# Patient Record
Sex: MALE | Race: WHITE | NOT HISPANIC OR LATINO | Employment: OTHER | URBAN - METROPOLITAN AREA
[De-identification: names, ages, dates, MRNs, and addresses within clinical notes are randomized per-mention and may not be internally consistent; named-entity substitution may affect disease eponyms.]

---

## 2023-09-06 ENCOUNTER — HOSPITAL ENCOUNTER (OUTPATIENT)
Dept: DATA CONVERSION | Facility: HOSPITAL | Age: 58
Discharge: HOME | End: 2023-09-06
Payer: COMMERCIAL

## 2023-09-06 DIAGNOSIS — M79.89 OTHER SPECIFIED SOFT TISSUE DISORDERS: ICD-10-CM

## 2023-09-06 LAB
ANION GAP SERPL CALCULATED.3IONS-SCNC: 13 MMOL/L (ref 0–19)
BUN SERPL-MCNC: 13 MG/DL (ref 8–25)
BUN/CREAT SERPL: 13 RATIO (ref 8–21)
CALCIUM SERPL-MCNC: 9.6 MG/DL (ref 8.5–10.4)
CHLORIDE SERPL-SCNC: 109 MMOL/L (ref 97–107)
CO2 SERPL-SCNC: 23 MMOL/L (ref 24–31)
CREAT SERPL-MCNC: 1 MG/DL (ref 0.4–1.6)
DEPRECATED RDW RBC AUTO: 41.5 FL (ref 37–54)
ERYTHROCYTE [DISTWIDTH] IN BLOOD BY AUTOMATED COUNT: 12.7 % (ref 11.7–15)
GFR SERPL CREATININE-BSD FRML MDRD: 87 ML/MIN/1.73 M2
GLUCOSE SERPL-MCNC: 98 MG/DL (ref 65–99)
HCT VFR BLD AUTO: 47.4 % (ref 41–50)
HGB BLD-MCNC: 15.4 GM/DL (ref 13.5–16.5)
MCH RBC QN AUTO: 28.9 PG (ref 26–34)
MCHC RBC AUTO-ENTMCNC: 32.5 % (ref 31–37)
MCV RBC AUTO: 88.9 FL (ref 80–100)
NRBC BLD-RTO: 0 /100 WBC
PLATELET # BLD AUTO: 260 K/UL (ref 150–450)
PMV BLD AUTO: 10.2 CU (ref 7–12.6)
POTASSIUM SERPL-SCNC: 4.5 MMOL/L (ref 3.4–5.1)
RBC # BLD AUTO: 5.33 M/UL (ref 4.5–5.5)
SODIUM SERPL-SCNC: 145 MMOL/L (ref 133–145)
WBC # BLD AUTO: 8.9 K/UL (ref 4.5–11)

## 2023-09-18 ENCOUNTER — HOSPITAL ENCOUNTER (OUTPATIENT)
Dept: DATA CONVERSION | Facility: HOSPITAL | Age: 58
Discharge: HOME | End: 2023-09-18
Payer: COMMERCIAL

## 2023-09-18 DIAGNOSIS — Z12.5 ENCOUNTER FOR SCREENING FOR MALIGNANT NEOPLASM OF PROSTATE: ICD-10-CM

## 2023-09-18 DIAGNOSIS — Z00.00 ENCOUNTER FOR GENERAL ADULT MEDICAL EXAMINATION WITHOUT ABNORMAL FINDINGS: ICD-10-CM

## 2023-09-18 LAB
ALBUMIN SERPL-MCNC: 4 GM/DL (ref 3.5–5)
ALBUMIN/GLOB SERPL: 1.9 RATIO (ref 1.5–3)
ALP BLD-CCNC: 78 U/L (ref 35–125)
ALT SERPL-CCNC: 17 U/L (ref 5–40)
ANION GAP SERPL CALCULATED.3IONS-SCNC: 11 MMOL/L (ref 0–19)
APPEARANCE PLAS: CLEAR
AST SERPL-CCNC: 20 U/L (ref 5–40)
BILIRUB SERPL-MCNC: 0.6 MG/DL (ref 0.1–1.2)
BUN SERPL-MCNC: 12 MG/DL (ref 8–25)
BUN/CREAT SERPL: 12 RATIO (ref 8–21)
CALCIUM SERPL-MCNC: 9.1 MG/DL (ref 8.5–10.4)
CHLORIDE SERPL-SCNC: 109 MMOL/L (ref 97–107)
CHOLEST SERPL-MCNC: 175 MG/DL (ref 133–200)
CHOLEST/HDLC SERPL: 3.1 RATIO
CO2 SERPL-SCNC: 24 MMOL/L (ref 24–31)
COLOR SPUN FLD: YELLOW
CREAT SERPL-MCNC: 1 MG/DL (ref 0.4–1.6)
DEPRECATED RDW RBC AUTO: 40.7 FL (ref 37–54)
ERYTHROCYTE [DISTWIDTH] IN BLOOD BY AUTOMATED COUNT: 12.6 % (ref 11.7–15)
FASTING STATUS PATIENT QL REPORTED: NORMAL
GFR SERPL CREATININE-BSD FRML MDRD: 87 ML/MIN/1.73 M2
GLOBULIN SER-MCNC: 2.1 G/DL (ref 1.9–3.7)
GLUCOSE SERPL-MCNC: 99 MG/DL (ref 65–99)
HCT VFR BLD AUTO: 46.2 % (ref 41–50)
HDLC SERPL-MCNC: 57 MG/DL
HGB BLD-MCNC: 15.1 GM/DL (ref 13.5–16.5)
LDLC SERPL CALC-MCNC: 105 MG/DL (ref 65–130)
MCH RBC QN AUTO: 28.9 PG (ref 26–34)
MCHC RBC AUTO-ENTMCNC: 32.7 % (ref 31–37)
MCV RBC AUTO: 88.5 FL (ref 80–100)
NRBC BLD-RTO: 0 /100 WBC
PLATELET # BLD AUTO: 243 K/UL (ref 150–450)
PMV BLD AUTO: 10.2 CU (ref 7–12.6)
POTASSIUM SERPL-SCNC: 4.2 MMOL/L (ref 3.4–5.1)
PROT SERPL-MCNC: 6.1 G/DL (ref 5.9–7.9)
PSA SERPL-MCNC: 0.8 NG/ML (ref 0–4.1)
RBC # BLD AUTO: 5.22 M/UL (ref 4.5–5.5)
SODIUM SERPL-SCNC: 144 MMOL/L (ref 133–145)
TRIGL SERPL-MCNC: 67 MG/DL (ref 40–150)
WBC # BLD AUTO: 6.1 K/UL (ref 4.5–11)

## 2023-09-19 ENCOUNTER — HOSPITAL ENCOUNTER (OUTPATIENT)
Dept: DATA CONVERSION | Facility: HOSPITAL | Age: 58
Discharge: HOME | End: 2023-09-19
Payer: COMMERCIAL

## 2023-09-19 DIAGNOSIS — R10.84 GENERALIZED ABDOMINAL PAIN: ICD-10-CM

## 2023-09-19 PROBLEM — R55 SYNCOPE AND COLLAPSE: Status: ACTIVE | Noted: 2023-09-19

## 2023-09-19 PROBLEM — D68.59 THROMBOPHILIA (MULTI): Status: ACTIVE | Noted: 2023-09-19

## 2023-09-19 PROBLEM — L25.9 CONTACT DERMATITIS: Status: ACTIVE | Noted: 2023-09-19

## 2023-09-19 PROBLEM — D32.9 MENINGIOMA (MULTI): Status: ACTIVE | Noted: 2023-09-19

## 2023-09-19 PROBLEM — R06.02 SHORTNESS OF BREATH: Status: ACTIVE | Noted: 2023-09-19

## 2023-09-19 PROBLEM — M41.9 SCOLIOSIS: Status: ACTIVE | Noted: 2023-09-19

## 2023-09-19 PROBLEM — I82.409 DEEP VENOUS THROMBOSIS OF LEG (MULTI): Status: ACTIVE | Noted: 2023-09-19

## 2023-09-19 PROBLEM — R00.2 PALPITATIONS: Status: ACTIVE | Noted: 2023-09-19

## 2023-09-19 PROBLEM — M79.2 NEUROPATHIC PAIN: Status: ACTIVE | Noted: 2023-09-19

## 2023-09-19 PROBLEM — R12 HEARTBURN: Status: ACTIVE | Noted: 2023-09-19

## 2023-09-19 PROBLEM — E66.9 OBESITY: Status: ACTIVE | Noted: 2023-09-19

## 2023-09-19 PROBLEM — E78.5 HYPERLIPIDEMIA: Status: ACTIVE | Noted: 2023-09-19

## 2023-09-19 PROBLEM — L42 PITYRIASIS ROSEA: Status: ACTIVE | Noted: 2023-09-19

## 2023-09-19 PROBLEM — D68.51 FACTOR V LEIDEN MUTATION (MULTI): Status: ACTIVE | Noted: 2023-09-19

## 2023-09-19 PROBLEM — R07.9 CHEST PAIN: Status: ACTIVE | Noted: 2023-09-19

## 2023-09-19 PROBLEM — D17.9 LIPOMA: Status: ACTIVE | Noted: 2023-09-19

## 2023-09-19 PROBLEM — S39.012A BACK STRAIN: Status: ACTIVE | Noted: 2023-09-19

## 2023-09-19 PROBLEM — J35.8 TONSIL STONE: Status: ACTIVE | Noted: 2023-09-19

## 2023-09-19 PROBLEM — I26.99 PULMONARY EMBOLISM (MULTI): Status: ACTIVE | Noted: 2023-09-19

## 2023-09-19 PROBLEM — G44.209 TENSION TYPE HEADACHE: Status: ACTIVE | Noted: 2023-09-19

## 2023-09-19 PROBLEM — F41.9 ANXIETY: Status: ACTIVE | Noted: 2023-09-19

## 2023-09-19 PROBLEM — R07.89 TIGHT CHEST: Status: ACTIVE | Noted: 2023-09-19

## 2023-09-19 PROBLEM — D68.2 FACTOR II DEFICIENCY (MULTI): Status: ACTIVE | Noted: 2023-09-19

## 2023-09-19 RX ORDER — CYCLOBENZAPRINE HCL 10 MG
5 TABLET ORAL 2 TIMES DAILY PRN
COMMUNITY
Start: 2023-05-02 | End: 2023-10-19 | Stop reason: WASHOUT

## 2023-09-19 RX ORDER — LORATADINE 10 MG/1
10 TABLET ORAL DAILY
COMMUNITY
End: 2023-10-19 | Stop reason: WASHOUT

## 2023-09-19 RX ORDER — OMEPRAZOLE 20 MG/1
20 CAPSULE, DELAYED RELEASE ORAL
COMMUNITY
Start: 2023-05-25 | End: 2023-10-19 | Stop reason: WASHOUT

## 2023-09-19 RX ORDER — LOPERAMIDE HCL 2 MG
TABLET ORAL
COMMUNITY
Start: 2022-06-08 | End: 2023-10-19 | Stop reason: WASHOUT

## 2023-09-19 RX ORDER — ACETAMINOPHEN 325 MG/1
TABLET ORAL
COMMUNITY

## 2023-09-19 RX ORDER — FLUTICASONE PROPIONATE 50 MCG
1 SPRAY, SUSPENSION (ML) NASAL DAILY
COMMUNITY

## 2023-09-19 RX ORDER — FLUOXETINE 10 MG/1
1 CAPSULE ORAL NIGHTLY
COMMUNITY
Start: 2016-02-01 | End: 2023-10-19 | Stop reason: WASHOUT

## 2023-09-19 RX ORDER — WARFARIN 10 MG/1
1 TABLET ORAL DAILY
COMMUNITY
End: 2023-10-19 | Stop reason: WASHOUT

## 2023-09-19 RX ORDER — DICLOFENAC SODIUM 10 MG/G
GEL TOPICAL
COMMUNITY
Start: 2023-02-14

## 2023-09-19 RX ORDER — ONDANSETRON 4 MG/1
4 TABLET, ORALLY DISINTEGRATING ORAL DAILY
COMMUNITY
Start: 2023-05-25 | End: 2023-10-19 | Stop reason: WASHOUT

## 2023-09-20 ENCOUNTER — HOSPITAL ENCOUNTER (OUTPATIENT)
Dept: DATA CONVERSION | Facility: HOSPITAL | Age: 58
Discharge: HOME | End: 2023-09-20
Payer: COMMERCIAL

## 2023-09-20 DIAGNOSIS — E66.9 OBESITY, UNSPECIFIED: ICD-10-CM

## 2023-09-20 DIAGNOSIS — Z86.718 PERSONAL HISTORY OF OTHER VENOUS THROMBOSIS AND EMBOLISM: ICD-10-CM

## 2023-09-20 DIAGNOSIS — Z79.01 LONG TERM (CURRENT) USE OF ANTICOAGULANTS: ICD-10-CM

## 2023-09-20 DIAGNOSIS — K21.9 GASTRO-ESOPHAGEAL REFLUX DISEASE WITHOUT ESOPHAGITIS: ICD-10-CM

## 2023-09-20 DIAGNOSIS — M79.89 OTHER SPECIFIED SOFT TISSUE DISORDERS: ICD-10-CM

## 2023-09-20 DIAGNOSIS — M79.9 SOFT TISSUE DISORDER, UNSPECIFIED: ICD-10-CM

## 2023-09-20 DIAGNOSIS — D68.2 HEREDITARY DEFICIENCY OF OTHER CLOTTING FACTORS (MULTI): ICD-10-CM

## 2023-09-20 DIAGNOSIS — K44.9 DIAPHRAGMATIC HERNIA WITHOUT OBSTRUCTION OR GANGRENE: ICD-10-CM

## 2023-09-20 DIAGNOSIS — Z86.711 PERSONAL HISTORY OF PULMONARY EMBOLISM: ICD-10-CM

## 2023-10-19 ENCOUNTER — OFFICE VISIT (OUTPATIENT)
Dept: PRIMARY CARE | Facility: CLINIC | Age: 58
End: 2023-10-19
Payer: COMMERCIAL

## 2023-10-19 VITALS
OXYGEN SATURATION: 97 % | RESPIRATION RATE: 18 BRPM | WEIGHT: 211.3 LBS | TEMPERATURE: 95.8 F | HEIGHT: 70 IN | DIASTOLIC BLOOD PRESSURE: 80 MMHG | HEART RATE: 61 BPM | BODY MASS INDEX: 30.25 KG/M2 | SYSTOLIC BLOOD PRESSURE: 118 MMHG

## 2023-10-19 DIAGNOSIS — Z00.00 ANNUAL PHYSICAL EXAM: Primary | ICD-10-CM

## 2023-10-19 DIAGNOSIS — Z23 ENCOUNTER FOR IMMUNIZATION: ICD-10-CM

## 2023-10-19 DIAGNOSIS — R91.8 MULTIPLE LUNG NODULES ON CT: ICD-10-CM

## 2023-10-19 PROBLEM — M79.2 NEUROPATHIC PAIN: Status: RESOLVED | Noted: 2023-09-19 | Resolved: 2023-10-19

## 2023-10-19 PROBLEM — I82.409 DEEP VENOUS THROMBOSIS OF LEG (MULTI): Status: RESOLVED | Noted: 2023-09-19 | Resolved: 2023-10-19

## 2023-10-19 PROBLEM — S39.012A BACK STRAIN: Status: RESOLVED | Noted: 2023-09-19 | Resolved: 2023-10-19

## 2023-10-19 PROBLEM — J35.8 TONSIL STONE: Status: RESOLVED | Noted: 2023-09-19 | Resolved: 2023-10-19

## 2023-10-19 PROBLEM — R07.9 CHEST PAIN: Status: RESOLVED | Noted: 2023-09-19 | Resolved: 2023-10-19

## 2023-10-19 PROBLEM — R06.02 SHORTNESS OF BREATH: Status: RESOLVED | Noted: 2023-09-19 | Resolved: 2023-10-19

## 2023-10-19 PROBLEM — R55 SYNCOPE AND COLLAPSE: Status: RESOLVED | Noted: 2023-09-19 | Resolved: 2023-10-19

## 2023-10-19 PROBLEM — L25.9 CONTACT DERMATITIS: Status: RESOLVED | Noted: 2023-09-19 | Resolved: 2023-10-19

## 2023-10-19 PROBLEM — D32.9 MENINGIOMA (MULTI): Status: RESOLVED | Noted: 2023-09-19 | Resolved: 2023-10-19

## 2023-10-19 PROBLEM — R12 HEARTBURN: Status: RESOLVED | Noted: 2023-09-19 | Resolved: 2023-10-19

## 2023-10-19 PROBLEM — D68.59 THROMBOPHILIA (MULTI): Status: RESOLVED | Noted: 2023-09-19 | Resolved: 2023-10-19

## 2023-10-19 PROBLEM — Z86.718 HISTORY OF DEEP VENOUS THROMBOSIS (DVT) OF DISTAL VEIN OF LEFT LOWER EXTREMITY: Status: ACTIVE | Noted: 2023-10-19

## 2023-10-19 PROBLEM — I26.99 PULMONARY EMBOLISM (MULTI): Status: RESOLVED | Noted: 2023-09-19 | Resolved: 2023-10-19

## 2023-10-19 PROBLEM — Z86.711 HISTORY OF PULMONARY EMBOLISM: Status: ACTIVE | Noted: 2023-10-19

## 2023-10-19 PROBLEM — D68.51 FACTOR V LEIDEN MUTATION (MULTI): Status: RESOLVED | Noted: 2023-09-19 | Resolved: 2023-10-19

## 2023-10-19 PROBLEM — R07.89 TIGHT CHEST: Status: RESOLVED | Noted: 2023-09-19 | Resolved: 2023-10-19

## 2023-10-19 PROBLEM — R00.2 PALPITATIONS: Status: RESOLVED | Noted: 2023-09-19 | Resolved: 2023-10-19

## 2023-10-19 PROCEDURE — 1036F TOBACCO NON-USER: CPT | Performed by: FAMILY MEDICINE

## 2023-10-19 PROCEDURE — 99396 PREV VISIT EST AGE 40-64: CPT | Performed by: FAMILY MEDICINE

## 2023-10-19 PROCEDURE — 3008F BODY MASS INDEX DOCD: CPT | Performed by: FAMILY MEDICINE

## 2023-10-19 PROCEDURE — 90686 IIV4 VACC NO PRSV 0.5 ML IM: CPT | Performed by: FAMILY MEDICINE

## 2023-10-19 RX ORDER — OMEPRAZOLE 40 MG/1
40 CAPSULE, DELAYED RELEASE ORAL
COMMUNITY
End: 2023-12-04 | Stop reason: WASHOUT

## 2023-10-19 ASSESSMENT — PAIN SCALES - GENERAL: PAINLEVEL: 2

## 2023-10-19 NOTE — PROGRESS NOTES
"Subjective   Patient ID: Mikey Salinas is a 57 y.o. male who presents for Annual Exam (Physical and bloodwork).  Last physical with previous PCP was 10/13/2022.  Last screening colonoscopy 2015. Having GI issues and seeing GI. On probiotics. Had CT Abp/P done 6/2022 he has questions about lung nodules which were small.  History of DVT PE  in 2014;  on Xarelto 20 mg daily.  Weight at that time was 237.  UTD with labs, did them 9/2023. Reviewed with patient.  Vaccinations with Shingles, Tdap UTD.         Review of Systems    Objective   /80   Pulse 61   Temp 35.4 °C (95.8 °F)   Resp 18   Ht 1.778 m (5' 10\")   Wt 95.8 kg (211 lb 4.8 oz)   SpO2 97%   BMI 30.32 kg/m²    Physical Exam  Vitals and nursing note reviewed.   Constitutional:       Appearance: Normal appearance.   HENT:      Head: Normocephalic.      Right Ear: Tympanic membrane normal.      Left Ear: Tympanic membrane normal.      Nose: Nose normal.      Mouth/Throat:      Mouth: Mucous membranes are moist.   Eyes:      Extraocular Movements: Extraocular movements intact.      Conjunctiva/sclera: Conjunctivae normal.      Pupils: Pupils are equal, round, and reactive to light.   Cardiovascular:      Rate and Rhythm: Normal rate and regular rhythm.      Heart sounds: Normal heart sounds.   Pulmonary:      Effort: Pulmonary effort is normal. No respiratory distress.      Breath sounds: Normal breath sounds.   Abdominal:      General: Abdomen is flat.      Palpations: Abdomen is soft.      Tenderness: There is no abdominal tenderness.   Musculoskeletal:      Cervical back: Neck supple.   Lymphadenopathy:      Cervical: No cervical adenopathy.   Skin:     General: Skin is warm and dry.      Findings: No rash.   Neurological:      General: No focal deficit present.      Mental Status: He is alert. Mental status is at baseline.      Coordination: Coordination normal.      Gait: Gait normal.      Deep Tendon Reflexes: Reflexes normal.   Psychiatric:    "      Mood and Affect: Mood normal.         Behavior: Behavior normal.         Assessment/Plan   Diagnoses and all orders for this visit:  Annual physical exam  Multiple lung nodules on CT  -     CT chest wo IV contrast; Future  Encounter for immunization  -     Flu vaccine (IIV4) age 6 months and greater, preservative free  Other orders  -     Follow Up In Primary Care - Health Maintenance; Future

## 2023-10-20 ENCOUNTER — ALLIED HEALTH (OUTPATIENT)
Dept: INTEGRATIVE MEDICINE | Facility: CLINIC | Age: 58
End: 2023-10-20

## 2023-10-20 PROCEDURE — 97810 ACUP 1/> WO ESTIM 1ST 15 MIN: CPT | Performed by: NATUROPATH

## 2023-10-20 PROCEDURE — 97811 ACUP 1/> W/O ESTIM EA ADD 15: CPT | Performed by: NATUROPATH

## 2023-10-20 NOTE — PROGRESS NOTES
Acupuncture Visit:     Subjective   Patient ID: Mikey Salinas is a 57 y.o. male who presents for No chief complaint on file.  Had lipoma surgery  Colonoscopy  14d abx ending Saturday  No working on weening pantoprazole  Still some bloating and discomfort.     Sleep more than typical , bit still nor a full night sleep    Still on mostly no dairy diet, down 26lbs from a year ago    BM- well formed, easy to pass,   Bloated feeling, no abdominal pain.               Session Information  Is this acupuncture treatment being billed to the patient's insurance company: No  Visit Type: Follow-up visit  Medical History Reviewed: I have reviewed pertinent medical history in EHR, and no contraindications are present to provide treatment               Review of Systems         Provider reviewed plan for the acupuncture session, precautions and contraindications. Patient/guardian/hospital staff has given consent to treat with full understanding of what to expect during the session. Before acupuncture began, provider explained to the patient to communicate at any time if the procedure was causing discomfort past their tolerance level. Patient agreed to advise acupuncturist. The acupuncturist counseled the patient on the risks of acupuncture treatment including pain, infection, bleeding, and no relief of pain. The patient was positioned comfortably. There was no evidence of infection at the site of needle insertions.    Objective   Physical Exam              Acupuncture Treatment  Body Points - Bilateral: Du 20,, LI11, SJ7, LI4, SP9, St36, SP6, KI7, LR3, SP3  Auricular Points: Yes  Auricular Points - Bilateral: ear ramirez men, pt 0  Other Techniques Utilized: TDP Lamp  TDP Lamp Descripton: abdomen  Needle Count In: 23  Needle Count Out: 23  Needle Retention Time (min): 25 minutes  Total Face to Face Time (min): 25 minutes              Assessment/Plan

## 2023-10-23 ENCOUNTER — OFFICE VISIT (OUTPATIENT)
Dept: SURGERY | Facility: CLINIC | Age: 58
End: 2023-10-23
Payer: COMMERCIAL

## 2023-10-23 VITALS
SYSTOLIC BLOOD PRESSURE: 112 MMHG | BODY MASS INDEX: 30.75 KG/M2 | DIASTOLIC BLOOD PRESSURE: 78 MMHG | OXYGEN SATURATION: 94 % | WEIGHT: 214.8 LBS | HEIGHT: 70 IN | TEMPERATURE: 98.2 F | HEART RATE: 67 BPM

## 2023-10-23 DIAGNOSIS — Z09 POSTOPERATIVE EXAMINATION: Primary | ICD-10-CM

## 2023-10-23 PROBLEM — H25.13 AGE-RELATED NUCLEAR CATARACT OF BOTH EYES: Status: ACTIVE | Noted: 2023-07-20

## 2023-10-23 PROCEDURE — 99024 POSTOP FOLLOW-UP VISIT: CPT | Performed by: STUDENT IN AN ORGANIZED HEALTH CARE EDUCATION/TRAINING PROGRAM

## 2023-10-23 PROCEDURE — 3008F BODY MASS INDEX DOCD: CPT | Performed by: STUDENT IN AN ORGANIZED HEALTH CARE EDUCATION/TRAINING PROGRAM

## 2023-10-23 PROCEDURE — 1036F TOBACCO NON-USER: CPT | Performed by: STUDENT IN AN ORGANIZED HEALTH CARE EDUCATION/TRAINING PROGRAM

## 2023-10-23 ASSESSMENT — ENCOUNTER SYMPTOMS
OCCASIONAL FEELINGS OF UNSTEADINESS: 0
LOSS OF SENSATION IN FEET: 0
DEPRESSION: 0

## 2023-10-23 ASSESSMENT — PATIENT HEALTH QUESTIONNAIRE - PHQ9
1. LITTLE INTEREST OR PLEASURE IN DOING THINGS: NOT AT ALL
2. FEELING DOWN, DEPRESSED OR HOPELESS: NOT AT ALL
SUM OF ALL RESPONSES TO PHQ9 QUESTIONS 1 AND 2: 0

## 2023-10-23 ASSESSMENT — PAIN SCALES - GENERAL: PAINLEVEL: 2

## 2023-10-23 NOTE — PROGRESS NOTES
Subjective   Patient ID: Mikey Salinas is a 57 y.o. male who presents for Follow-up (S/P Excision of Multiple Lipomas and Abdominal Wall Mass 09/20/2023 ).  Doing well since surgery.  The wounds have been healing up without issue and the glue is now mostly off.  He was on vacation and did not have any trouble with the incisions while away.          Objective   Physical Exam  Abdominal:      Palpations: Abdomen is soft.      Comments: 3 incisions on the left abdomen and 1 on the right flank all well-healing.  Small amount of glue remaining on the right flank.  No surrounding erythema or drainage.         Assessment/Plan   Problem List Items Addressed This Visit    None  Visit Diagnoses         Codes    Postoperative examination    -  Primary Z09          Status post excision of 4 lipomas of his abdominal wall, recovering well.  We reviewed the pathology which were lipomas for all of them.  All questions were answered.  He can follow-up as needed.

## 2023-11-13 ENCOUNTER — OFFICE VISIT (OUTPATIENT)
Dept: CARDIOLOGY | Facility: CLINIC | Age: 58
End: 2023-11-13
Payer: COMMERCIAL

## 2023-11-13 VITALS
DIASTOLIC BLOOD PRESSURE: 93 MMHG | WEIGHT: 212.8 LBS | RESPIRATION RATE: 18 BRPM | OXYGEN SATURATION: 99 % | TEMPERATURE: 98.9 F | BODY MASS INDEX: 30.46 KG/M2 | SYSTOLIC BLOOD PRESSURE: 137 MMHG | HEART RATE: 68 BPM | HEIGHT: 70 IN

## 2023-11-13 DIAGNOSIS — R00.2 PALPITATIONS: ICD-10-CM

## 2023-11-13 DIAGNOSIS — E78.2 MIXED HYPERLIPIDEMIA: ICD-10-CM

## 2023-11-13 DIAGNOSIS — R07.89 TIGHT CHEST: Primary | ICD-10-CM

## 2023-11-13 DIAGNOSIS — I10 PRIMARY HYPERTENSION: ICD-10-CM

## 2023-11-13 PROCEDURE — 3080F DIAST BP >= 90 MM HG: CPT | Performed by: INTERNAL MEDICINE

## 2023-11-13 PROCEDURE — 1036F TOBACCO NON-USER: CPT | Performed by: INTERNAL MEDICINE

## 2023-11-13 PROCEDURE — 99213 OFFICE O/P EST LOW 20 MIN: CPT | Performed by: INTERNAL MEDICINE

## 2023-11-13 PROCEDURE — 3008F BODY MASS INDEX DOCD: CPT | Performed by: INTERNAL MEDICINE

## 2023-11-13 PROCEDURE — 3075F SYST BP GE 130 - 139MM HG: CPT | Performed by: INTERNAL MEDICINE

## 2023-11-13 PROCEDURE — 93000 ELECTROCARDIOGRAM COMPLETE: CPT | Performed by: INTERNAL MEDICINE

## 2023-11-13 RX ORDER — PANTOPRAZOLE SODIUM 40 MG/1
40 TABLET, DELAYED RELEASE ORAL DAILY
COMMUNITY
Start: 2023-11-06 | End: 2024-05-06 | Stop reason: DRUGHIGH

## 2023-11-13 RX ORDER — PANTOPRAZOLE SODIUM 20 MG/1
20 TABLET, DELAYED RELEASE ORAL DAILY
COMMUNITY
Start: 2023-11-09

## 2023-11-13 RX ORDER — KETOCONAZOLE 20 MG/G
1 CREAM TOPICAL 2 TIMES DAILY
COMMUNITY
Start: 2023-11-08

## 2023-11-13 ASSESSMENT — PAIN SCALES - GENERAL: PAINLEVEL: 3

## 2023-11-13 NOTE — PROGRESS NOTES
History of present illness:   58 year old male patient here today following up on hx of PE a few years ago . Patient on life long oral anticoagulation.  Here in my office for follow-up.  Has been feeling overall good denies any chest pain or shortness of breath.  No dizziness or lightheadedness.  Had a coronary calcium score in July 2023 which was total score of 0.  Patient has no chest pains.     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    History of deep venous thrombosis (DVT) of distal vein of left lower extremity 10/19/2023    2014    History of pulmonary embolism 10/19/2023    2014    Hyperlipidemia 09/19/2023    Other pulmonary embolism without acute cor pulmonale (CMS/[facility]) 02/03/2015    Pulmonary embolism    Personal history of diseases of the blood and blood-forming organs and certain disorders involving the immune mechanism     History of hypercoagulable state    Personal history of other venous thrombosis and embolism     History of deep venous thrombosis       Past Surgical History:   Procedure Laterality Date    KNEE ARTHROSCOPY W/ DEBRIDEMENT  02/02/2015    Arthroscopy Knee Left    LIPOMA RESECTION N/A 09/2023    multiple abdominal lipoma's excised    MR HEAD ANGIO WO IV CONTRAST  12/15/2016    MR HEAD ANGIO WO IV CONTRAST [facility]       Allergies   Allergen Reactions    Chocolate Flavor GI Upset    Cocoa Nausea/vomiting    Penicillins Rash        reports that he has never smoked. He has never been exposed to tobacco smoke. He has never used smokeless tobacco. He reports current alcohol use of about 2.0 standard drinks of alcohol per week. He reports that he does not use drugs.    Family History   Problem Relation Name Age of Onset    Macular degeneration Mother Megha     Osteoporosis Mother Megha     Transient ischemic attack Mother Megha     Brain Aneurysm  Mother Megha     Hypertension Mother Megha     Other (Heart valve surgery) Father Manjinder     Other (RHEUMATIC HEART DISEASE) Father Ray     Nephrolithiasis Father Ray     Heart disease Father Ray     Hypertension Father Ray     Breast cancer Sister Peg     Irritable bowel syndrome Sister Peg     ROGER disease Sister Peg     Hypertension Sister Peg     Prostate cancer Brother      Cancer Other GRAND FATHER        Patient's Medications   New Prescriptions    No medications on file   Previous Medications    ACETAMINOPHEN (TYLENOL) 325 MG TABLET    Take by mouth.    DICLOFENAC SODIUM (VOLTAREN) 1 % GEL GEL    Apply topically.    FAMOTIDINE-CA CARB-MAG HYDROX (PEPCID COMPLETE) -165 MG CHEWABLE TABLET    Chew 1 tablet once daily as needed for heartburn.    FLUTICASONE (FLONASE ALLERGY RELIEF) 50 MCG/ACTUATION NASAL SPRAY    Administer 1 spray into each nostril once daily.    KETOCONAZOLE (NIZORAL) 2 % CREAM    Apply 1 Application topically 2 times a day.    L.ACID/L.CASEI/B.BIF/B.LIZZY/FOS (PROBIOTIC BLEND ORAL)    Take by mouth.    OMEPRAZOLE (PRILOSEC) 40 MG DR CAPSULE    Take 1 capsule (40 mg) by mouth once daily in the morning. Take before meals. Do not crush or chew.    PANTOPRAZOLE (PROTONIX) 20 MG EC TABLET    Take 1 tablet (20 mg) by mouth once daily.    PANTOPRAZOLE (PROTONIX) 40 MG EC TABLET    Take 1 tablet (40 mg) by mouth once daily.    RIVAROXABAN (XARELTO) 20 MG TABLET    Take 1 tablet (20 mg) by mouth once daily.   Modified Medications    No medications on file   Discontinued Medications    No medications on file       Objective   Physical Exam  General: Patient in no acute distress   HEENT: Atraumatic normocephalic.  Neck: Supple, jugular venous pressure within normal limit.  No bruits  Lungs: Clear to auscultation bilaterally  Cardiovascular: Regular rate and rhythm, normal heart sounds, no murmurs rubs or gallops  Abdomen: Soft nontender nondistended.  Normal bowel sounds.  Extremities: Warm to touch, no  "edema.    Lab Review   [facility] Encounter on 09/20/2023   Component Date Value    CONVERTED FINAL DIAGNOSIS 09/20/2023                      Value:  1. SOFT TISSUE OF RIGHT FLANK, EXCISION:       FRAGMENTS OF MATURE ADIPOSE TISSUE CONSISTENT WITH LIPOMA.    2. SOFT TISSUE OF LEFT UPPER ABDOMINAL WALL, EXCISION:       LIPOMA.    3. SOFT TISSUE OF LEFT LOWER ABDOMINAL WALL, EXCISION:       LIPOMA.    4. SOFT TISSUE OF MID ABDOMINAL WALL, EXCISION:       FRAGMENTS OF MATURE ADIPOSE TISSUE CONSISTENT WITH LIPOMA.  CORA SOSA M.D.  09/25/2023 09:56  [facility] - 09/22/2023      CONVERTED CLINICAL DIAGN* 09/20/2023 Soft tissue mass abdominal     CONVERTED GROSS DESCRIPT* 09/20/2023                      Value:1.  Received in formalin labeled with the patient's name and hospital  number and \"right lipoma flank #1\" is a 2.0 x 1.7 x 1.2 cm, 3 g  aggregate of lobulated and yellow fibrofatty soft tissues.  The external  surfaces are inked blue.  The cut surfaces are homogeneous, lobulated,  yellow, and fatty.  There are no areas of hemorrhage, necrosis,  granularity, or fleshy foci.  The specimen is entirely submitted in 1  cassette.    2.  Received in formalin labeled with the patient's name and hospital  number and \"left upper abdomen lipoma #2\" is 1 nodular piece of yellow  fibrofatty soft tissue measuring 2.5 x 2.5 x 1.4 cm, 4 g.  The external  surfaces are smooth to lobulated and inked blue.  The cut surface is  homogeneous, yellow, and fatty.  There are no areas of hemorrhage,  necrosis, granularity, or fleshy foci.  The specimen is entirely  submitted in 1 cassette.    3.  Received in formalin labeled with the patient's name and hospital  number and \"left lower abdomen lipoma #3\" is 1 thi                          nly encapsulated and  nodular portion of yellow fibrofatty soft tissue measuring 1.9 x 0.9 x  0.6 cm, 2 g.  The smooth and intact capsule is inked blue.  The cut  surface is homogeneous, lobulated, yellow, and fatty.  " "There are no  areas of hemorrhage, necrosis, granularity, or fleshy foci.  The  specimen is entirely submitted in 1 cassette.    4.  Received in formalin labeled with the patient's name and hospital  number and \"left mid abdomen lipoma #4\" is a 2.1 x 1.5 x 0.6 cm, 2 g  aggregate of yellow fibrofatty soft tissue.  The external surfaces are  demarcated and inked black.  The cut surfaces are homogeneous,  lobulated, yellow, and fatty.  There are no areas of hemorrhage,  necrosis, granularity, or fleshy foci.  The specimen is entirely  submitted in 1 cassette.      CONVERTED PRE-OPERATIVE * 09/20/2023                      Value:Excision soft tissue mass abdominal wall > 3 cm and multiple lipomas of  trunk      CONVERTED MICROSCOPIC DE* 09/20/2023 Slides examined; description omitted.    Hospital Outpatient Visit on 09/18/2023   Component Date Value    WBC 09/18/2023 6.1     RBC 09/18/2023 5.22     Hemoglobin 09/18/2023 15.1     Hematocrit 09/18/2023 46.2     MCV 09/18/2023 88.5     MCH 09/18/2023 28.9     MCHC 09/18/2023 32.7     RDW-SD 09/18/2023 40.7     RDW-CV 09/18/2023 12.6     Platelets 09/18/2023 243     MPV 09/18/2023 10.2     nRBC 09/18/2023 0     Calcium 09/18/2023 9.1     AST 09/18/2023 20     Alkaline Phosphatase 09/18/2023 78     Total Bilirubin 09/18/2023 0.6     Total Protein 09/18/2023 6.1     Albumin 09/18/2023 4.0     Globulin, Total 09/18/2023 2.1     A/G Ratio 09/18/2023 1.9     Sodium 09/18/2023 144     Potassium 09/18/2023 4.2     Chloride 09/18/2023 109 (H)     Bicarbonate 09/18/2023 24     Anion Gap 09/18/2023 11     Urea Nitrogen 09/18/2023 12     Creatinine 09/18/2023 1.0     Urea Nitrogen/Creatinine* 09/18/2023 12.0     Glucose 09/18/2023 99     ALT (SGPT) 09/18/2023 17     ESTIMATED GFR 09/18/2023 87     SERUM COLOR 09/18/2023 YELLOW     SERUM CLARITY 09/18/2023 CLEAR     Is the patient fasting? 09/18/2023 FASTING     Cholesterol 09/18/2023 175     Triglycerides 09/18/2023 67     HDL " 09/18/2023 57     LDL Calculated 09/18/2023 105     Cholesterol/HDL Ratio 09/18/2023 3.1     PSA 09/18/2023 0.8         Assessment/Plan   Patient Active Problem List   Diagnosis    Anxiety    Factor II deficiency (CMS/HCC)    Hyperlipidemia    Lipoma    Obesity    Pityriasis rosea    Scoliosis    Tension type headache    History of pulmonary embolism    History of deep venous thrombosis (DVT) of distal vein of left lower extremity    Age-related nuclear cataract of both eyes         58 year old male patient here today following up on hx of PE a few years ago . Patient on life long oral anticoagulation.  Here in my office for follow-up.  Has been feeling overall good denies any chest pain or shortness of breath.  No dizziness or lightheadedness.  Had a coronary calcium score in July 2023 which was total score of 0.  Patient has no chest pains.  At this point recommended risk factor modification follow-up in 1 year.  EKG today showed normal sinus rhythm nonspecific ST-T wave abnormalities. Will follow up in  1 year    Rodo Kaur MD

## 2023-11-22 ENCOUNTER — E-VISIT (OUTPATIENT)
Dept: PRIMARY CARE | Facility: CLINIC | Age: 58
End: 2023-11-22
Payer: COMMERCIAL

## 2023-11-22 DIAGNOSIS — G89.4 CHRONIC PAIN SYNDROME: Primary | ICD-10-CM

## 2023-12-04 ENCOUNTER — OFFICE VISIT (OUTPATIENT)
Dept: PRIMARY CARE | Facility: CLINIC | Age: 58
End: 2023-12-04
Payer: COMMERCIAL

## 2023-12-04 VITALS
RESPIRATION RATE: 18 BRPM | TEMPERATURE: 95.9 F | HEIGHT: 70 IN | WEIGHT: 214.2 LBS | OXYGEN SATURATION: 97 % | HEART RATE: 77 BPM | BODY MASS INDEX: 30.67 KG/M2 | SYSTOLIC BLOOD PRESSURE: 130 MMHG | DIASTOLIC BLOOD PRESSURE: 78 MMHG

## 2023-12-04 DIAGNOSIS — M41.9 SCOLIOSIS, UNSPECIFIED SCOLIOSIS TYPE, UNSPECIFIED SPINAL REGION: ICD-10-CM

## 2023-12-04 DIAGNOSIS — R10.84 GENERALIZED ABDOMINAL PAIN: Primary | ICD-10-CM

## 2023-12-04 DIAGNOSIS — S80.262D: ICD-10-CM
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
## 2023-12-04 PROBLEM — K44.9 GASTROESOPHAGEAL REFLUX DISEASE WITH HIATAL HERNIA: Status: ACTIVE | Noted: 2023-12-04

## 2023-12-04 PROBLEM — K21.9 GASTROESOPHAGEAL REFLUX DISEASE WITH HIATAL HERNIA: Status: ACTIVE | Noted: 2023-12-04

## 2023-12-04 PROCEDURE — 99214 OFFICE O/P EST MOD 30 MIN: CPT | Performed by: FAMILY MEDICINE

## 2023-12-04 PROCEDURE — 1036F TOBACCO NON-USER: CPT | Performed by: FAMILY MEDICINE

## 2023-12-04 PROCEDURE — 3008F BODY MASS INDEX DOCD: CPT | Performed by: FAMILY MEDICINE

## 2023-12-04 ASSESSMENT — PAIN SCALES - GENERAL: PAINLEVEL: 3

## 2023-12-04 NOTE — PROGRESS NOTES
"History Of Present Illness  Mikey Salinas is a 58 y.o. male presenting for \"Lyme Disease (Lyme Titer./Swelling in ABD).\"    Has various concerns:    1. Was in urgent care two months ago after he found a tick on him on a trip to [place]. Tick attachment was 24 hours at most. No symptoms. Urgent care started him on doxycycline and told him to repeat titers. He completed the abx and has not had any new symptoms.     2. Ongoing abdominal pain that wraps the front of his stomach. Has seen GI (Dr. Jack) for evaluation. Trialed on a PPI and probiotics, which he reports minimally relieved symptoms. Follow up with GI resulted in a recommendation for referral to Rheumatology, which he has an appointment with next month.  He was told it was not a GI etiology, but musculoskeletal. However, taking muscle relaxers are not helpful. He did acupuncture and it also minimally relieved the abdominal pain.    3. He has scoliosis and is concerned that is causes musculoskeletal issues. At this point, patient is very frustrated and discouraged. Mood has been down, low motivation. He has never been on any SSRIs/SNRIs in the past. He would like to hold off on mood medications until workup is completed.         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    diclofenac sodium " (Voltaren) 1 % gel gel Apply topically.    famotidine-Ca carb-mag hydrox (Pepcid Complete) -165 mg chewable tablet Chew 1 tablet once daily as needed for heartburn.    fluticasone (Flonase Allergy Relief) 50 mcg/actuation nasal spray Administer 1 spray into each nostril once daily.    ketoconazole (NIZOral) 2 % cream Apply 1 Application topically 2 times a day.    L.acid/L.casei/B.bif/B.kenroy/FOS (PROBIOTIC BLEND ORAL) Take by mouth.    pantoprazole (ProtoNix) 20 mg EC tablet Take 1 tablet (20 mg) by mouth once daily.    pantoprazole (ProtoNix) 40 mg EC tablet Take 1 tablet (40 mg) by mouth once daily.    rivaroxaban (Xarelto) 20 mg tablet Take 1 tablet (20 mg) by mouth once daily.    [DISCONTINUED] omeprazole (PriLOSEC) 40 mg DR capsule Take 1 capsule (40 mg) by mouth once daily in the morning. Take before meals. Do not crush or chew.     No current facility-administered medications on file prior to visit.        Surgical History  He has a past surgical history that includes Knee arthroscopy w/ debridement (02/02/2015); MR angio head wo IV contrast (12/15/2016); Lipoma resection (N/A, 09/2023); and [place] tooth extraction (1982).     Social History  He reports that he has never smoked. He has never been exposed to tobacco smoke. He has never used smokeless tobacco. He reports current alcohol use of about 2.0 standard drinks of alcohol per week. He reports that he does not use drugs.    Family History  Family History   Problem Relation Name Age of Onset    Macular degeneration Mother Megha     Osteoporosis Mother Megha     Transient ischemic attack Mother Megha     Brain Aneurysm Mother Megha     Hypertension Mother Megha     Other (Heart valve surgery) Father Ray     Other (RHEUMATIC HEART DISEASE) Father Ray     Nephrolithiasis Father Ray     Heart disease Father Ray     Hypertension Father Ray     Breast cancer Sister Peg     Irritable bowel syndrome Sister Peg     ROGER disease Sister Peg     Hypertension  Sister Peg     Cancer Sister Peg     Prostate cancer Brother      Cancer Other GRAND FATHER     Cancer Brother Yves Salinas         Allergies  Chocolate flavor, Cocoa, and Penicillins    ROS  Negative, except in HPI     Last Recorded Vitals  /78   Pulse 77   Temp 35.5 °C (95.9 °F)   Resp 18   Wt 97.2 kg (214 lb 3.2 oz)   SpO2 97%   Body mass index is 30.73 kg/m².     Physical Exam  Vitals and nursing note reviewed.   Constitutional:       General: He is not in acute distress.     Appearance: Normal appearance.   Cardiovascular:      Rate and Rhythm: Normal rate and regular rhythm.      Heart sounds: Normal heart sounds.   Pulmonary:      Effort: No respiratory distress.      Breath sounds: Normal breath sounds.   Neurological:      General: No focal deficit present.      Mental Status: He is alert. Mental status is at baseline.         Relevant Results      Assessment/Plan   Mikey was seen today for lyme disease.  Diagnoses and all orders for this visit:  Generalized abdominal pain (Primary)  Comments:  CT abd/pelvis as previously ordered. Rheum appt as scheduled. Cont PPI as it did improved sx.  Scoliosis, unspecified scoliosis type, unspecified spinal region  -     Cancel: XR lumbar spine 2-3 views; Future  -     Cancel: XR sacrum coccyx 2+ views; Future  -     Referral to Physical Therapy; Future  -     XR EOS full spine 2 view scolosis; Future  Tick bite of left knee, subsequent encounter  -     Lyme AB Modified 2-Tier Testing, 1st Tier; Future     Medications Discontinued During This Encounter   Medication Reason    omeprazole (PriLOSEC) 40 mg  capsule Med List Cleanup           Felipe Salcedo MD

## 2023-12-05 ENCOUNTER — ALLIED HEALTH (OUTPATIENT)
Dept: INTEGRATIVE MEDICINE | Facility: CLINIC | Age: 58
End: 2023-12-05

## 2023-12-05 DIAGNOSIS — K44.9 GASTROESOPHAGEAL REFLUX DISEASE WITH HIATAL HERNIA: Primary | ICD-10-CM

## 2023-12-05 DIAGNOSIS — F41.9 ANXIETY: ICD-10-CM

## 2023-12-05 DIAGNOSIS — K21.9 GASTROESOPHAGEAL REFLUX DISEASE WITH HIATAL HERNIA: Primary | ICD-10-CM

## 2023-12-05 NOTE — PROGRESS NOTES
Acupuncture Visit:     Subjective   Patient ID: Mikey Salinas is a 58 y.o. male who presents for No chief complaint on file.  [place] been doing OK  Still has bloating/irritation  Started dicyclomine, seems to be helping some of pressure sensation.   BM normal-   Gas- passing more than others based on diet  Had more spicy food and is a little worse today  Better laying down.     Added dairy back in, no major change                          Review of Systems         Provider reviewed plan for the acupuncture session, precautions and contraindications. Patient/guardian/hospital staff has given consent to treat with full understanding of what to expect during the session. Before acupuncture began, provider explained to the patient to communicate at any time if the procedure was causing discomfort past their tolerance level. Patient agreed to advise acupuncturist. The acupuncturist counseled the patient on the risks of acupuncture treatment including pain, infection, bleeding, and no relief of pain. The patient was positioned comfortably. There was no evidence of infection at the site of needle insertions.    Objective   Physical Exam       Acupuncture Treatment  Body Points - Bilateral: Du 20,, LI11, SJ7, LI4, SP9, St36, SP6, KI7, LR3, SP3  Auricular Points: Yes  Auricular Points - Bilateral: ear ramirez men, pt 0  Other Techniques Utilized: TDP Lamp  TDP Lamp Descripton: abdomen  Needle Count In: 23  Needle Count Out: 23  Needle Retention Time (min): 25 minutes  Total Face to Face Time (min): 25 minutes                      Assessment/Plan

## 2023-12-07 NOTE — PATIENT INSTRUCTIONS
Supplements-   Start-  From Fullscript  GI Microb-X (120 capsules) ([employer]): 1 capsule 3 times a day for 2 weeks then decreased to twice a day.  .   Biocidin Broad Spectrum Liquid Formula (Formerly Advanced Formula) (1 Ounce) ([employer]): Please take  1 drop, three times / day (Begin with 1 drop and gradually increase up to 5 drops 3 times per day)  Add lauren or peppermint tea- 2-3 cups a day    Other considerations  SIBO test  [employer]  Order tests  Small Intestinal Bacterial Overgrowth 3-Hr Lactulose (SIBO)-[employer] Kit $249.00

## 2023-12-12 ENCOUNTER — APPOINTMENT (OUTPATIENT)
Dept: INTEGRATIVE MEDICINE | Facility: CLINIC | Age: 58
End: 2023-12-12

## 2023-12-17 NOTE — PROGRESS NOTES
Subjective   Patient ID: Mikey Salinas is a 58 y.o. male who presents for No chief complaint on file..    HPI  Consult  Sent by GI eval him for ABD pain  Tick Bite Fall 2023 treated immediatly with doxycycline     Bloating edge rib cage  Gi meds eased pressure  Thought cartilage edge rib cage    Has scioilois losing height  Rubbing on ribs  Sees PT   Years ago orthopedics 16 degress no fu recently   Some night pain after 5 hours of sound sleep  No joint pain   Feels well        ROS  Pain only  All systems neg     Current Outpatient Medications   Medication Sig Dispense Refill    acetaminophen (TylenoL) 325 mg tablet Take by mouth.      diclofenac sodium (Voltaren) 1 % gel gel Apply topically.      famotidine-Ca carb-mag hydrox (Pepcid Complete) -165 mg chewable tablet Chew 1 tablet once daily as needed for heartburn.      fluticasone (Flonase Allergy Relief) 50 mcg/actuation nasal spray Administer 1 spray into each nostril once daily.      ketoconazole (NIZOral) 2 % cream Apply 1 Application topically 2 times a day.      L.acid/L.casei/B.bif/B.kenroy/FOS (PROBIOTIC BLEND ORAL) Take by mouth.      pantoprazole (ProtoNix) 20 mg EC tablet Take 1 tablet (20 mg) by mouth once daily.      pantoprazole (ProtoNix) 40 mg EC tablet Take 1 tablet (40 mg) by mouth once daily.      rivaroxaban (Xarelto) 20 mg tablet Take 1 tablet (20 mg) by mouth once daily.       No current facility-administered medications for this visit.         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   Past Surgical History:   Procedure Laterality Date    KNEE ARTHROSCOPY W/ DEBRIDEMENT  02/02/2015    Arthroscopy Knee Left    LIPOMA RESECTION N/A 09/2023    multiple abdominal lipoma's excised    MR HEAD ANGIO WO IV CONTRAST  12/15/2016    MR HEAD ANGIO WO IV CONTRAST [facility]    WISDOM TOOTH EXTRACTION  1982      Social History     Tobacco Use    Smoking status: Never     Passive exposure: Never    Smokeless tobacco: Never   Vaping Use    Vaping Use: Never used   Substance Use Topics    Alcohol use: Yes     Alcohol/week: 2.0 standard drinks of alcohol     Types: 2 Standard drinks or equivalent per week     Comment: occasionally    Drug use: Never      family history includes Brain Aneurysm in his mother; Breast cancer in his sister; Cancer in his brother, sister, and another family member; ROGER disease in his sister; Heart disease in his father; Heart valve surgery in his father; Hypertension in his father, mother, and sister; Irritable bowel syndrome in his sister; Macular degeneration in his mother; Nephrolithiasis in his father; Osteoporosis in his mother; Prostate cancer in his brother; RHEUMATIC HEART DISEASE in his father; Transient ischemic attack in his mother.  Pain Management Panel           No data to display                 There were no vitals filed for this visit.  Lab Results   Component Value Date    TSH 0.53 09/24/2020       PHYSICAL EXAM      NODES all stations negative  HEART  LUNGS clear  ABDOMEN abdomen is soft no hepatosplenomegaly no pain  VASCULAR negative  NEURO normal muscle strength  SKIN negative  JOINTS no evidence of active synovitis muscle weakness  There is a significant scoliosis seen on examination of the spine.  He is forward flexed on gait  PLAN    Patient referred for upper abdominal pain at the edge of the ribs bilaterally  There is no evidence for any  rheumatologic cause  His pain is from significant scoliosis and his pain is mechanical.  X-ray was reviewed but measurements were not taken at this time a radiologic report is pending but there appears to be significant scoliosis whether this has advanced since high school is unknown    Plan:  At present time he sees a chiropractor and is presently getting acupuncture  He just started physical therapy and should continue  If his pain is progressive and causing a decreased quality of life he should be referred to spine specialist with expertise in scoliosis.

## 2023-12-18 ENCOUNTER — ANCILLARY PROCEDURE (OUTPATIENT)
Dept: RADIOLOGY | Facility: CLINIC | Age: 58
End: 2023-12-18
Payer: COMMERCIAL

## 2023-12-18 ENCOUNTER — APPOINTMENT (OUTPATIENT)
Dept: PRIMARY CARE | Facility: CLINIC | Age: 58
End: 2023-12-18
Payer: COMMERCIAL

## 2023-12-18 DIAGNOSIS — M41.9 SCOLIOSIS, UNSPECIFIED SCOLIOSIS TYPE, UNSPECIFIED SPINAL REGION: ICD-10-CM

## 2023-12-18 PROCEDURE — 72082 X-RAY EXAM ENTIRE SPI 2/3 VW: CPT | Performed by: STUDENT IN AN ORGANIZED HEALTH CARE EDUCATION/TRAINING PROGRAM

## 2023-12-18 PROCEDURE — 72082 X-RAY EXAM ENTIRE SPI 2/3 VW: CPT | Mod: FY

## 2023-12-19 ENCOUNTER — EVALUATION (OUTPATIENT)
Dept: PHYSICAL THERAPY | Facility: CLINIC | Age: 58
End: 2023-12-19
Payer: COMMERCIAL

## 2023-12-19 DIAGNOSIS — M41.9 SCOLIOSIS, UNSPECIFIED SCOLIOSIS TYPE, UNSPECIFIED SPINAL REGION: ICD-10-CM

## 2023-12-19 DIAGNOSIS — G89.29 CHRONIC BILATERAL LOW BACK PAIN, UNSPECIFIED WHETHER SCIATICA PRESENT: Primary | ICD-10-CM

## 2023-12-19 DIAGNOSIS — M54.50 CHRONIC BILATERAL LOW BACK PAIN, UNSPECIFIED WHETHER SCIATICA PRESENT: Primary | ICD-10-CM

## 2023-12-19 PROCEDURE — 97110 THERAPEUTIC EXERCISES: CPT | Mod: GP | Performed by: PHYSICAL THERAPIST

## 2023-12-19 PROCEDURE — 97161 PT EVAL LOW COMPLEX 20 MIN: CPT | Mod: GP | Performed by: PHYSICAL THERAPIST

## 2023-12-19 ASSESSMENT — ENCOUNTER SYMPTOMS
DEPRESSION: 0
LOSS OF SENSATION IN FEET: 0
OCCASIONAL FEELINGS OF UNSTEADINESS: 0

## 2023-12-19 NOTE — PROGRESS NOTES
"Physical Therapy    Physical Therapy Evaluation and Treatment    Patient Name: Mikey Salinas  MRN: [id number]  Today's Date: 12/19/2023    Time Calculation  Start Time: 0748  Stop Time: 0845  Time Calculation (min): 57 min  No auth. 20 visits - 0 used.    Visit 1/10 POC-Initial Eval  Current Problem: PT diagnosis:  1. Chronic bilateral low back pain, unspecified whether sciatica present  Follow Up In Physical Therapy      2. 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   Pt does report increased sx post lifting a tipped motorcycle last June. Pt has done DC for past 5 years-manipulation P/A and activator.  Pt also sees spine specialist every 6 weeks-helps.  Overall his status is the same with good and bad days.  Pt denies loss bowel bladder, N/T, motor loss  Pt also has abdominal pain seeing GI specialist and reports tenderness anterior just below ribs-dx GERD  Imaging:   Had updated XR result not available      Pain:   Current: 2-5/10  Lowest: 0/10  Highest: 6/10  Location: LBP RT>LT  Onset: chronic   Description: lower back aches  Aggravating Factors: lifting   Relieving Factors: stretching, hook   Sleep Pattern: supine-pillow elevate  Previous Interventions: DC, massage       Red flags: " -    Hand Dominance: RT  Occupation: previously a banker   Hobbies: hunting, hikes, rides motor cycle , fly fish  Home Living: multistory home   Prior Level of Function: same     Patient/Family Goal: stabilization exercises     Outcome Measures: 14% LESLEE    OBJECTIVE:    Cognition: intact   Posture: lt iliac crest higher, scoliosis-post thoracic scapular prominence , genu varum  Gait: no foot drop, decreased trunk rotation   Functional Mobility: I with supine to sit and and sit to stand no facial grimmace, performs stairs step over step  Palpation: non tender T/L psp mm or gluteals IT, pt pt does feel pain distal to rib RT side anterior when he palpates -told it is GI related     Joint Mobility:   LAROM-standing   Extension- 25% loss, LBP transient, no change   Flexion full no sx   Lateral flexion 25% loss to left and right no sx  Rot Lt and RT 25% loss    RFIL-SKTC no increased sx  EIL-Prone lying no sx    LE ROM:  Full all planes of and pain free     LE strength:  Quads, hams, hip adductors, TA, PF full range heel and toe walk,  all-5/5 per MMT, hip ABD and ER 5-/5  SLR sign negative LT/RT   Hip Scour pain Left transient anterior hip,none on RT    Flexibility: mod loss, hamstrings and quads  bilateral     Treatments:  Therapeutic Exercise: (15 minutes)   Supine:   -SKTC-LT/RT contra leg extended x3 20 sec   -TVA hook x 10/3 sec   -90/90 hamstring stretch x5 10 sec   Prone quad stretch manual     Manual Therapy: ( minutes)    Therapeutic Activity: (3 minutes)  Transfers bed log riky supine to sit transfers and supine lying with pillow under knees,avoid BLT, how to lift     OP Education: see there activities, pt rx POC and eval findings     HEP:pt educated to perform gamaliel as tolerated stop if greater pain or N/T,hand outs issued  Access Code: [email or web address]  URL: [email or web address]  Date: 12/19/2023  Prepared by: Mariajose Quintero    Exercises  - Supine Transversus Abdominis Bracing - Hands on Stomach  - 2 x daily -  5 x weekly - 1-2 sets - 10 reps - 3 hold  - Supine Single Knee to Chest  - 1-2 x daily - 7 x weekly - 3 reps - 20 hold  - Supine Hamstring Stretch  - 2 x daily - 7 x weekly - 15 reps - 10 hold either or,  - Supine Hamstring Stretch with Strap  - 2 x daily - 7 x weekly - 3 reps - 20 hold    Patient Education  - Log Roll,supine sit transfers, lifting     Response to treatment: pt with no change in sx and had no pain with hep issued     ASSESSMENT: minimal complexity eval   59 yo male for PT eval d/t chronic LBP RT>LT-last 10 years( with hx of scoliosis-16 deg since high school). Pt presents with: scoliosis and RT thoracic rib hump with forward bend test. Pt has loss L AROM 25%-mild LBP with extension others not painful, ADL impairment- 14% LESLEE-prolonged stand , car rides, lifting,( rosie heavy)., poor body mechanics bed mobility,LE flexibility loss, mild core/hip weakness.  Pt could benefit from PT to address the above impairments and improve function.   Co-morbidities:clotting disorder factor 2 prothrombin-on blood thinners -gentle with manual,NO DN,2014-blood clot left knee to lungs-broken up-slight lung scarring-no SOB,GERD,Anxiety-self reported-reports poor sleep, digestive issues-occasional abdominal pain RT.       PLAN:  See how pt did with starting some flexion based gamaliel, DLS and flexibility   OP PT PLAN:  Treatment/Interventions: Aquatic Therapy , Education/Instruction , Electrical Stimulation , Manual Therapy  , Self care/home management , Therapeutic activities , and Therapeutic exercise    PT Plan: Skilled PT   PT Frequency: 1-2 times per week  Duration:5 and will reassess   Certification Period Start Date:   Certification Period End Date:   Visits Approved: 20  Rehab Potential: Fair  Plan of Care Agreement: Patient         Goals:   STG: 10  Pt will report 25% reduction in pain 2-5/10 with activity  Pt will improve hamstring flexibility to min loss   Pt will improve lumbar arom 25%   Pt  will demo correct  body mechanics with supine to sit transfers.      LTG 15  Pt will improve core/hip strength to 5/5.  Pt will improve 10% with LESLEE scores indicating improved function,currently 14% LESLEE

## 2023-12-20 ENCOUNTER — OFFICE VISIT (OUTPATIENT)
Dept: RHEUMATOLOGY | Facility: CLINIC | Age: 58
End: 2023-12-20
Payer: COMMERCIAL

## 2023-12-20 DIAGNOSIS — G89.4 CHRONIC PAIN SYNDROME: ICD-10-CM

## 2023-12-20 PROCEDURE — 3008F BODY MASS INDEX DOCD: CPT | Performed by: INTERNAL MEDICINE

## 2023-12-20 PROCEDURE — 99204 OFFICE O/P NEW MOD 45 MIN: CPT | Performed by: INTERNAL MEDICINE

## 2023-12-20 PROCEDURE — 1036F TOBACCO NON-USER: CPT | Performed by: INTERNAL MEDICINE

## 2023-12-21 ENCOUNTER — TREATMENT (OUTPATIENT)
Dept: PHYSICAL THERAPY | Facility: CLINIC | Age: 58
End: 2023-12-21
Payer: COMMERCIAL

## 2023-12-21 DIAGNOSIS — M54.50 CHRONIC BILATERAL LOW BACK PAIN, UNSPECIFIED WHETHER SCIATICA PRESENT: ICD-10-CM

## 2023-12-21 DIAGNOSIS — G89.29 CHRONIC BILATERAL LOW BACK PAIN, UNSPECIFIED WHETHER SCIATICA PRESENT: ICD-10-CM

## 2023-12-21 PROCEDURE — 97110 THERAPEUTIC EXERCISES: CPT | Mod: GP | Performed by: PHYSICAL THERAPIST

## 2023-12-21 NOTE — PROGRESS NOTES
Physical Therapy    Physical Therapy Treatment    Patient Name: Mikey Salinas  MRN: [id number]  Today's Date: 12/21/2023    Time Calculation  Start Time: 0910  Stop Time: 0952  Time Calculation (min): 42 min    Visit #: 2 out of 10  Evaluation date: 12/1923    Current Problem:   1. Chronic bilateral low back pain, unspecified whether sciatica present  Follow Up In Physical Therapy          SUBJECTIVE:   Pt was told that rt side abdominal pain is from scoliosis twisting and compressing that area, pt could be referred to a spine specialist, rheumatologist read his XR.  Pt still fees bloating and discomfort.  Pt states no gamaliel in hep is painful and he feels better transiently post.          Precautions: Scoliosis 16 deg per pt  Cervical   GERD-abdominal pain  Anxiety-self reported-reports poor sleep, digestive issues   clotting disorder factor 2 prothrombin-on blood thinners -gentle with manual  NO DN  2014-blood clot left knee to lungs-broken up-slight lung scarring-no SOB  Bursitis of shoulders   Surgical History: lipoma removal end September-torso     XR:12/18/23:    FINDINGS:  There is significant dextroconvex scoliosis of the thoracic spine  centered around T7-T8 and T8-T9 levels. There are moderate multilevel  discogenic degenerative changes of thoracic spine, more pronounced at  T7-T8 and T8-T9 levels. There is mild superior endplate compression  deformities of L2 and L3 vertebral bodies, similar compared to prior  CT examination dated 06/30/2023. Otherwise rest of the lumbar  vertebral body heights are maintained. There is grade 1  anterolisthesis of L5 over S1 vertebral body with suggestion of  bilateral pars defects. The rest of the visualized osseous skeleton  appears grossly unremarkable. No acute fracture deformity is noted.  Bilateral lungs are clear. Bowel gas pattern is nonobstructive. No  obvious soft tissue abnormality is noted.      IMPRESSION:  1. Dextroconvex scoliosis of the thoracic spine. Precise  measurements  will be performed by orthopedic service.  2. Mild superior endplate compression fracture deformities of L2 and  L3 vertebral bodies, similar compared to prior CT examination dated  06/30/2023.  3. Grade 1 anterolisthesis of L5 over S1 vertebral body with  suggestion of bilateral pars defects.            Pain:   Start of session: lower back pain 2/10 greater right        OBJECTIVE:    Tight hamstrings 45 deg at 90 deg     Treatments:  Therapeutic Exercise: (40 minutes)   Recumbent bike L2 x5 -to improve mobility and strengthen legs for ADLS   Supine:   -SKTC-LT/RT contra leg extended x3 20 sec-gentle not forcing end range    -TVA hook x 10/3 sec   -TVA hook hip adduction x10 3 sec   -90/90 hamstring stretch x3 20 sec with strap  -clam hip side lying-x10 ea side   -supine gluteal sets x10 ea  bolster under his legs   -fig 4 hip ER stretch no OP 3x 20 sec     Manual Therapy: ( minutes)    Gait Training: ( minutes)    Neuromuscular Re-education: ( minutes)    Therapeutic Activity: (2 minutes)   Log Roll,supine sit transfers         HEP:  pt educated to perform gamaliel as tolerated stop if greater pain or N/T,hand outs issued  Access Code: [email or web address]  URL: [email or web address]  Date: 12/19/2023  Prepared by: Mariajose Quintero     Exercises  - Supine Transversus Abdominis Bracing - Hands on Stomach  - 2 x daily - 5 x weekly - 1-2 sets - 10 reps - 3 hold  - Supine Single Knee to Chest  - 1-2 x daily - 7 x weekly - 3 reps - 20 hold  - Supine Hamstring Stretch  - 2 x daily - 7 x weekly - 15 reps - 10 hold either or,  - Supine Hamstring Stretch with Strap  - 2 x daily - 7 x weekly - 3 reps - 20 hold    ASSESSMENT:   Pt needed review again of body mechanics with transfers, not turning to side with supine to sit.    No greater sx with gamaliel    Post session pain: 1-2 less      PLAN:  See how pt did with new gamaliel and update hep  Neutral spine strengthening  OP PT PLAN:  Treatment/Interventions: Aquatic Therapy ,  Education/Instruction , Electrical Stimulation , Manual Therapy  , Neuromuscular re-education , Therapeutic activities , and Therapeutic exercise    PT Plan: Skilled PT   PT Frequency: 1-2 times per week  Duration:5 weeks and reassess up to 15  Certification Period Start Date:   Certification Period End Date:   Visits Approved: 20  Rehab Potential: Fair  Plan of Care Agreement: Patient         Goals:   STG: 10  Pt will report 25% reduction in pain 2-5/10 with activity  Pt will improve hamstring flexibility to min loss   Pt will improve lumbar arom 25%   Pt  will demo correct body mechanics with supine to sit transfers.        LTG 15  Pt will improve core/hip strength to 5/5.  Pt will improve 10% with LESLEE scores indicating improved function,currently 14% LESLEE

## 2023-12-21 NOTE — PROGRESS NOTES
Physical Therapy    Physical Therapy Treatment    Patient Name: Mieky Salinas  MRN: [id number]  Today's Date: 12/21/2023    Time Calculation  Start Time: 0910  Stop Time: 0952  Time Calculation (min): 42 min    Visit #: 2 out of 10, 20 insurance   Evaluation date: 12/19/2023     Current Problem:   1. Chronic bilateral low back pain, unspecified whether sciatica present            SUBJECTIVE:   Pt was told that rt side abdominal pain is from scoliosis twisting and compressing that area, pt could be referred to a spine specialist, rheumatologist read his XR.  Pt still fees bloating and discomfort.  Pt states no gamaliel in hep is painful and he feels better transiently post.       Precautions:   Scoliosis 16 deg per pt  Cervical   GERD-abdominal pain  Anxiety-self reported-reports poor sleep, digestive issues   clotting disorder factor 2 prothrombin-on blood thinners -gentle with manual  NO DN  2014-blood clot left knee to lungs-broken up-slight lung scarring-no SOB  Bursitis of shoulders   Surgical History: lipoma removal end September-torso    XR:12/18/23:    FINDINGS:  There is significant dextroconvex scoliosis of the thoracic spine  centered around T7-T8 and T8-T9 levels. There are moderate multilevel  discogenic degenerative changes of thoracic spine, more pronounced at  T7-T8 and T8-T9 levels. There is mild superior endplate compression  deformities of L2 and L3 vertebral bodies, similar compared to prior  CT examination dated 06/30/2023. Otherwise rest of the lumbar  vertebral body heights are maintained. There is grade 1  anterolisthesis of L5 over S1 vertebral body with suggestion of  bilateral pars defects. The rest of the visualized osseous skeleton  appears grossly unremarkable. No acute fracture deformity is noted.  Bilateral lungs are clear. Bowel gas pattern is nonobstructive. No  obvious soft tissue abnormality is noted.      IMPRESSION:  1. Dextroconvex scoliosis of the thoracic spine. Precise  measurements  will be performed by orthopedic service.  2. Mild superior endplate compression fracture deformities of L2 and  L3 vertebral bodies, similar compared to prior CT examination dated  06/30/2023.  3. Grade 1 anterolisthesis of L5 over S1 vertebral body with  suggestion of bilateral pars defects.            Pain:   Start of session:   lower back pain 2/10 greater right       OBJECTIVE:    Tight hamstrings 45 deg at 90 deg    Treatments:  Therapeutic Exercise: (40 minutes)  Recumbent bike L2 x5 -to improve mobility and strengthen legs for ADLS   Supine:   -SKTC-LT/RT contra leg extended x3 20 sec-gentle not forcing end range    -TVA hook x 10/3 sec   -TVA hook hip adduction x10 3 sec   -90/90 hamstring stretch x3 20 sec with strap  -clam hip side lying-x10 ea side   -supine gluteal sets x10 ea  bolster under his legs   -fig 4 hip ER stretch no OP 3x 20 sec    Manual Therapy: ( minutes)    Neuromuscular Re-education: ( minutes)    Therapeutic Activity: (2 minutes)  - Log Roll,supine sit transfers      HEP:  pt educated to perform gamaliel as tolerated stop if greater pain or N/T,hand outs issued  Access Code: [email or web address]  URL: [email or web address]  Date: 12/19/2023  Prepared by: Mariajose Quintero     Exercises  - Supine Transversus Abdominis Bracing - Hands on Stomach  - 2 x daily - 5 x weekly - 1-2 sets - 10 reps - 3 hold  - Supine Single Knee to Chest  - 1-2 x daily - 7 x weekly - 3 reps - 20 hold  - Supine Hamstring Stretch  - 2 x daily - 7 x weekly - 15 reps - 10 hold either or,  - Supine Hamstring Stretch with Strap  - 2 x daily - 7 x weekly - 3 reps - 20 hold    ASSESSMENT:   Pt needed review again of body mechanics with transfers, not turning to side with supine to sit.    No greater sx with gamaliel  Post session pain: 1-2 less      PLAN:  See how pt did with new gamaliel and update hep as indicated  Core neutral spine strengthening    OP PT PLAN:  Treatment/Interventions: Aquatic Therapy , Education/Instruction ,  Electrical Stimulation , Manual Therapy  , Self care/home management , Therapeutic activities , and Therapeutic exercise    PT Plan: Skilled PT   PT Frequency: 1-2 times per week  Duration:5 and will reassess  Certification Period Start Date:   Certification Period End Date:   Visits Approved: 20  Rehab Potential: Fair  Plan of Care Agreement: Patient         Goals: STG: 10  Pt will report 25% reduction in pain 2-5/10 with activity  Pt will improve hamstring flexibility to min loss   Pt will improve lumbar arom 25%   Pt  will demo correct body mechanics with supine to sit transfers.        LTG 15  Pt will improve core/hip strength to 5/5.  Pt will improve 10% with LESLEE scores indicating improved function,currently 14% LESLEE

## 2023-12-26 ENCOUNTER — TREATMENT (OUTPATIENT)
Dept: PHYSICAL THERAPY | Facility: CLINIC | Age: 58
End: 2023-12-26
Payer: COMMERCIAL

## 2023-12-26 DIAGNOSIS — M54.50 CHRONIC BILATERAL LOW BACK PAIN, UNSPECIFIED WHETHER SCIATICA PRESENT: ICD-10-CM

## 2023-12-26 DIAGNOSIS — G89.29 CHRONIC BILATERAL LOW BACK PAIN, UNSPECIFIED WHETHER SCIATICA PRESENT: ICD-10-CM

## 2023-12-26 PROCEDURE — 97110 THERAPEUTIC EXERCISES: CPT | Mod: GP,CQ

## 2023-12-26 NOTE — PROGRESS NOTES
Physical Therapy    Physical Therapy Treatment    Patient Name: Mikey Salinas  MRN: [id number]  Today's Date: 12/26/2023    Time Calculation  Start Time: 0845  Stop Time: 0927  Time Calculation (min): 42 min    Visit #: 3 out of 10  Evaluation date: 12/1923    Current Problem:   1. Chronic bilateral low back pain, unspecified whether sciatica present  Follow Up In Physical Therapy        SUBJECTIVE:   Pt reports sx level relatively the same, increased stiffness at night, loosens up as the day goes on, compliant with HEP.  Pt has inversion table at home  which he has not used in a while      Precautions: Scoliosis 16 deg per pt  Cervical   GERD-abdominal pain  Anxiety-self reported-reports poor sleep, digestive issues   clotting disorder factor 2 prothrombin-on blood thinners -gentle with manual  NO DN  2014-blood clot left knee to lungs-broken up-slight lung scarring-no SOB  Bursitis of shoulders   Surgical History: lipoma removal end September-torso     XR:12/18/23:  FINDINGS:   There is significant dextroconvex scoliosis of the thoracic spine centered around T7-T8 and T8-T9 levels. There are moderate multilevel discogenic degenerative changes of thoracic spine, more pronounced at T7-T8 and T8-T9 levels. There is mild superior endplate compression deformities of L2 and L3 vertebral bodies, similar compared to prior CT examination dated 06/30/2023. Otherwise rest of the lumbar vertebral body heights are maintained. There is grade 1 anterolisthesis of L5 over S1 vertebral body with suggestion of  bilateral pars defects. The rest of the visualized osseous skeleton appears grossly unremarkable. No acute fracture deformity is noted. Bilateral lungs are clear. Bowel gas pattern is nonobstructive. No obvious soft tissue abnormality is noted.      IMPRESSION:  1. Dextroconvex scoliosis of the thoracic spine. Precise measurements will be performed by orthopedic service.  2. Mild superior endplate compression fracture  deformities of L2 and L3 vertebral bodies, similar compared to prior CT examination dated 06/30/2023.  3. Grade 1 anterolisthesis of L5 over S1 vertebral body with suggestion of bilateral pars defects.        Pain:   Start of Session: 2/10     OBJECTIVE:    Tight hamstrings 45 deg at 90 deg     Treatments:  Therapeutic Exercise: (42 minutes)   Recumbent bike L2 x6 -to improve mobility and strengthen legs for ADLS   Supine:   -glut seats 10 x 2    -TVA hook x 10 x 1 (3 sec hold)  -TVA hook hip adduction x10 (3 sec hold)  _TVA +hip abd green tb 10 x 2  -S/L hip abd 10 x 2 L/R    -90/90 hamstring flossing 30 sec L/R x 2  -fig 4 hip ER stretch L/R x 3     HEP:  pt educated to perform gamaliel as tolerated stop if greater pain or N/T,hand outs issued  Access Code: [email or web address]  URL: [email or web address]  Date: 12/19/2023  Prepared by: Mariajose Quintero     Exercises  - Supine Transversus Abdominis Bracing - Hands on Stomach  - 2 x daily - 5 x weekly - 1-2 sets - 10 reps - 3 hold  - Supine Single Knee to Chest  - 1-2 x daily - 7 x weekly - 3 reps - 20 hold  - Supine Hamstring Stretch  - 2 x daily - 7 x weekly - 15 reps - 10 hold either or,  - Supine Hamstring Stretch with Strap  - 2 x daily - 7 x weekly - 3 reps - 20 hold    ASSESSMENT:   Tolerated progression of neutral spine core stabilization, proximal LE strengthening exercise program this date without increase in sx level.     Post session pain: 1-2 less      PLAN:  See how pt did with new gamaliel and update hep  Neutral spine strengthening    OP PT PLAN:  Treatment/Interventions: Aquatic Therapy , Education/Instruction , Electrical Stimulation , Manual Therapy  , Neuromuscular re-education , Therapeutic activities , and Therapeutic exercise    PT Plan: Skilled PT   PT Frequency: 1-2 times per week  Duration: 5 weeks and reassess up to 15  Visits Approved: 20  Rehab Potential: Fair  Plan of Care Agreement: Patient         Goals:   STG: 10  Pt will report 25% reduction in pain  2-5/10 with activity -PROGRESSING  Pt will improve hamstring flexibility to min loss  -PROGRESSING   Pt will improve lumbar arom 25%  -PROGRESSING  Pt  will demo correct body mechanics with supine to sit transfers.  -PROGRESSING     LTG 15  Pt will improve core/hip strength to 5/5.  -PROGRESSING  Pt will improve 10% with LESLEE scores indicating improved function,currently 14% LESLEE  -PROGRESSING

## 2023-12-28 ENCOUNTER — TREATMENT (OUTPATIENT)
Dept: PHYSICAL THERAPY | Facility: CLINIC | Age: 58
End: 2023-12-28
Payer: COMMERCIAL

## 2023-12-28 DIAGNOSIS — G89.29 CHRONIC BILATERAL LOW BACK PAIN, UNSPECIFIED WHETHER SCIATICA PRESENT: ICD-10-CM

## 2023-12-28 DIAGNOSIS — M54.50 CHRONIC BILATERAL LOW BACK PAIN, UNSPECIFIED WHETHER SCIATICA PRESENT: ICD-10-CM

## 2023-12-28 PROCEDURE — 97110 THERAPEUTIC EXERCISES: CPT | Mod: GP

## 2023-12-28 NOTE — PROGRESS NOTES
Physical Therapy    Physical Therapy Treatment    Patient Name: Mikey Salinas  MRN: [id number]  Today's Date: 12/28/2023    Time Calculation  Start Time: 0848  Stop Time: 0929  Time Calculation (min): 41 min    Visit #: 4 out of 10  Evaluation date: 12/1923    Current Problem:   1. Chronic bilateral low back pain, unspecified whether sciatica present  Follow Up In Physical Therapy        SUBJECTIVE:   Patient reports that he is a little better today. Patient used incline table and felt a release which felt good. Denies increase in pain after last treatment session.     Precautions: Scoliosis 16 deg per pt  Cervical   GERD-abdominal pain  Anxiety-self reported-reports poor sleep, digestive issues   clotting disorder factor 2 prothrombin-on blood thinners -gentle with manual  NO DN  2014-blood clot left knee to lungs-broken up-slight lung scarring-no SOB  Bursitis of shoulders   Surgical History: lipoma removal end September-torso  XR:12/18/23:  FINDINGS:   There is significant dextroconvex scoliosis of the thoracic spine centered around T7-T8 and T8-T9 levels. There are moderate multilevel discogenic degenerative changes of thoracic spine, more pronounced at T7-T8 and T8-T9 levels. There is mild superior endplate compression deformities of L2 and L3 vertebral bodies, similar compared to prior CT examination dated 06/30/2023. Otherwise rest of the lumbar vertebral body heights are maintained. There is grade 1 anterolisthesis of L5 over S1 vertebral body with suggestion of  bilateral pars defects. The rest of the visualized osseous skeleton appears grossly unremarkable. No acute fracture deformity is noted. Bilateral lungs are clear. Bowel gas pattern is nonobstructive. No obvious soft tissue abnormality is noted.      IMPRESSION:  1. Dextroconvex scoliosis of the thoracic spine. Precise measurements will be performed by orthopedic service.  2. Mild superior endplate compression fracture deformities of L2 and L3  "vertebral bodies, similar compared to prior CT examination dated 06/30/2023.  3. Grade 1 anterolisthesis of L5 over S1 vertebral body with suggestion of bilateral pars defects.     Pain:   Start of Session: 1/10     OBJECTIVE:      Treatments:  Therapeutic Exercise: (41 minutes)  Recumbent bike L2 x8 mins  Supine SKTC x2 each (encouraged to perform in pain free range)  H/L TrA brace 2x10  H/L TrA brace + glute set + bridge 2x10  H/L TrA brace + hip ADD (ball) 2x10, 3\" hold  Clam shell 2x10 ea.   Figure 4 stretch 3x20 sec. Ea.   H/L nerve glide x10 ea.  Standing at bar+ TrA brace:   Hip ABD 2x10 ea.   Hip EXT 2x10 ea.    HEP:  Access Code: [email or web address]  URL: [email or web address]  Date: 12/28/2023  Prepared by: Danii    Exercises  - Supine Bridge  - 1 x daily - 4 x weekly - 2 sets - 10 reps  - Clamshell  - 1 x daily - 4 x weekly - 2 sets - 10 reps    pt educated to perform gamaliel as tolerated stop if greater pain or N/T,hand outs issued  Access Code: [email or web address]  URL: [email or web address]  Date: 12/19/2023  Prepared by: Mariajose Quintero     Exercises  - Supine Transversus Abdominis Bracing - Hands on Stomach  - 2 x daily - 5 x weekly - 1-2 sets - 10 reps - 3 hold  - Supine Single Knee to Chest  - 1-2 x daily - 7 x weekly - 3 reps - 20 hold  - Supine Hamstring Stretch  - 2 x daily - 7 x weekly - 15 reps - 10 hold either or,  - Supine Hamstring Stretch with Strap  - 2 x daily - 7 x weekly - 3 reps - 20 hold    ASSESSMENT:   Patient demonstrates increased hip mobility on L compared to R through interventions. Patient was able to progress strengthening interventions at this date with abdominal contraction to promote improved spinal stability. Updated I HEP at this date. Patient tolerated interventions without increase in pain at this date.     Post session pain: no increase     PLAN:  See how pt did with new gamaliel and update hep  Neutral spine strengthening    OP PT PLAN:  Treatment/Interventions: Aquatic Therapy , " Education/Instruction , Electrical Stimulation , Manual Therapy  , Neuromuscular re-education , Therapeutic activities , and Therapeutic exercise    PT Plan: Skilled PT   PT Frequency: 1-2 times per week  Duration: 5 weeks and reassess up to 15  Visits Approved: 20  Rehab Potential: Fair  Plan of Care Agreement: Patient         Goals:   STG: 10  Pt will report 25% reduction in pain 2-5/10 with activity -PROGRESSING  Pt will improve hamstring flexibility to min loss  -PROGRESSING   Pt will improve lumbar arom 25%  -PROGRESSING  Pt  will demo correct body mechanics with supine to sit transfers.  -PROGRESSING     LTG 15  Pt will improve core/hip strength to 5/5.  -PROGRESSING  Pt will improve 10% with LESLEE scores indicating improved function,currently 14% LESLEE  -PROGRESSING

## 2024-01-02 ENCOUNTER — ALLIED HEALTH (OUTPATIENT)
Dept: INTEGRATIVE MEDICINE | Facility: CLINIC | Age: 59
End: 2024-01-02

## 2024-01-02 ENCOUNTER — PATIENT MESSAGE (OUTPATIENT)
Dept: PRIMARY CARE | Facility: CLINIC | Age: 59
End: 2024-01-02

## 2024-01-02 ENCOUNTER — TREATMENT (OUTPATIENT)
Dept: PHYSICAL THERAPY | Facility: CLINIC | Age: 59
End: 2024-01-02
Payer: COMMERCIAL

## 2024-01-02 DIAGNOSIS — F41.9 ANXIETY: ICD-10-CM

## 2024-01-02 DIAGNOSIS — K21.9 GASTROESOPHAGEAL REFLUX DISEASE WITH HIATAL HERNIA: Primary | ICD-10-CM

## 2024-01-02 DIAGNOSIS — K44.9 GASTROESOPHAGEAL REFLUX DISEASE WITH HIATAL HERNIA: Primary | ICD-10-CM

## 2024-01-02 DIAGNOSIS — H93.19 TINNITUS, UNSPECIFIED LATERALITY: Primary | ICD-10-CM

## 2024-01-02 DIAGNOSIS — G89.29 CHRONIC BILATERAL LOW BACK PAIN, UNSPECIFIED WHETHER SCIATICA PRESENT: Primary | ICD-10-CM

## 2024-01-02 DIAGNOSIS — M54.50 CHRONIC BILATERAL LOW BACK PAIN, UNSPECIFIED WHETHER SCIATICA PRESENT: Primary | ICD-10-CM

## 2024-01-02 PROCEDURE — 97811 ACUP 1/> W/O ESTIM EA ADD 15: CPT | Performed by: NATUROPATH

## 2024-01-02 PROCEDURE — 97810 ACUP 1/> WO ESTIM 1ST 15 MIN: CPT | Performed by: NATUROPATH

## 2024-01-02 NOTE — PROGRESS NOTES
Acupuncture Visit:     Subjective   Patient ID: Mikey Salinas is a 58 y.o. male who presents for No chief complaint on file.  Did SIBO tests, waiting on results  Statred supplements  Bloating upper abdomen is better, but now feels lower  Seeing new GI- stopped meds and possibly rebuilding protocol, off all for now    BM: well formed, frayed edges- 2-3x a day, no blood or mucus  Gas- maybe a little less  Sleep- up and down sometime sback sometiems stomach waking.               Session Information  Is this acupuncture treatment being billed to the patient's insurance company: No  Visit Type: Follow-up visit  Medical History Reviewed: I have reviewed pertinent medical history in EHR, and no contraindications are present to provide treatment               Review of Systems         Provider reviewed plan for the acupuncture session, precautions and contraindications. Patient/guardian/hospital staff has given consent to treat with full understanding of what to expect during the session. Before acupuncture began, provider explained to the patient to communicate at any time if the procedure was causing discomfort past their tolerance level. Patient agreed to advise acupuncturist. The acupuncturist counseled the patient on the risks of acupuncture treatment including pain, infection, bleeding, and no relief of pain. The patient was positioned comfortably. There was no evidence of infection at the site of needle insertions.    Objective   Physical Exam       Acupuncture Treatment  Body Points - Bilateral: Du 20,, LI11, SJ7, LI4, ST25, Srinath 12SP9, St36, SP6, KI7, LR3, SP3  Auricular Points: Yes  Auricular Points - Bilateral: ear ramirez men, pt 0  Other Techniques Utilized: TDP Lamp  TDP Lamp Descripton: abdomen  Needle Count In: 26  Needle Count Out: 26  Needle Retention Time (min): 25 minutes  Total Face to Face Time (min): 25 minutes                      Assessment/Plan   Diagnoses and all orders for this  visit:  Gastroesophageal reflux disease with hiatal hernia (Primary)  Anxiety

## 2024-01-04 ENCOUNTER — TREATMENT (OUTPATIENT)
Dept: PHYSICAL THERAPY | Facility: CLINIC | Age: 59
End: 2024-01-04
Payer: COMMERCIAL

## 2024-01-04 DIAGNOSIS — G89.29 CHRONIC BILATERAL LOW BACK PAIN, UNSPECIFIED WHETHER SCIATICA PRESENT: ICD-10-CM

## 2024-01-04 DIAGNOSIS — M54.50 CHRONIC BILATERAL LOW BACK PAIN, UNSPECIFIED WHETHER SCIATICA PRESENT: ICD-10-CM

## 2024-01-04 PROCEDURE — 97110 THERAPEUTIC EXERCISES: CPT | Mod: GP | Performed by: PHYSICAL THERAPIST

## 2024-01-04 NOTE — PROGRESS NOTES
Physical Therapy    Physical Therapy Treatment    Patient Name: Mikey Salinas  MRN: [id number]  Today's Date: 1/4/2024    Time Calculation  Start Time: 0825  Stop Time: 0908  Time Calculation (min): 43 min    Visit #: 5 out of 10, 1/2024, ins 20 year   Evaluation date: 12/1923    Current Problem:   1. Chronic bilateral low back pain, unspecified whether sciatica present  Follow Up In Physical Therapy        SUBJECTIVE:   Pt will see spine MD on 9th suggested he consult with  MD re diagnostics prior to repeating inversion table.  Pt states his DC did some manipulations LB and said ok to do inversion table as long as not painful.  Pt did not feel pain, felt good.    Ok after last session some thoracic discomfort with bridge.-discussed with pt decreasing rom and making sure he is not pushing his head back into table    Precautions: Scoliosis 16 deg per pt  Cervical   GERD-abdominal pain  Anxiety-self reported-reports poor sleep, digestive issues   clotting disorder factor 2 prothrombin-on blood thinners -gentle with manual  NO DN  2014-blood clot left knee to lungs-broken up-slight lung scarring-no SOB  Bursitis of shoulders   Surgical History: lipoma removal end September-torso  XR:12/18/23:  FINDINGS:   There is significant dextroconvex scoliosis of the thoracic spine centered around T7-T8 and T8-T9 levels. There are moderate multilevel discogenic degenerative changes of thoracic spine, more pronounced at T7-T8 and T8-T9 levels. There is mild superior endplate compression deformities of L2 and L3 vertebral bodies, similar compared to prior CT examination dated 06/30/2023. Otherwise rest of the lumbar vertebral body heights are maintained. There is grade 1 anterolisthesis of L5 over S1 vertebral body with suggestion of  bilateral pars defects. The rest of the visualized osseous skeleton appears grossly unremarkable. No acute fracture deformity is noted. Bilateral lungs are clear. Bowel gas pattern is nonobstructive. No  "obvious soft tissue abnormality is noted.      IMPRESSION:  1. Dextroconvex scoliosis of the thoracic spine. Precise measurements will be performed by orthopedic service.  2. Mild superior endplate compression fracture deformities of L2 and L3 vertebral bodies, similar compared to prior CT examination dated 06/30/2023.  3. Grade 1 anterolisthesis of L5 over S1 vertebral body with suggestion of bilateral pars defects.     Pain:   Start of Session: 2/10 RT>LT LBP L4-5     OBJECTIVE:      Tight hamstrings bilateral     Treatments:  Therapeutic Exercise: (41 minutes)  Recumbent bike L3 x8 mins  Supine SKTC x2 each (encouraged to perform in pain free range)  H/L TrA brace 2x12/3 sec   H/L TrA brace + glute set + bridge 2x12-small range   H/L TrA brace + hip ADD (ball) 2x12, 3\" hold  Clam shell 2x12 ea.   Figure 4 stretch 3x20 sec. Ea.   H/L nerve glide x10 ea.    Standing at bar+ TrA brace:  Abd brace DL heel raises x15  DL slant board calf stretch 3x 20 sec    Hip ABD 2x12 ea.   Hip EXT 2x12 ea.    HEP:  Access Code: [email or web address]  URL: [email or web address]  Date: 12/28/2023  Prepared by: Danii    Exercises  - Supine Bridge  - 1 x daily - 4 x weekly - 2 sets - 10 reps  - Clamshell  - 1 x daily - 4 x weekly - 2 sets - 10 reps    pt educated to perform gamaliel as tolerated stop if greater pain or N/T,hand outs issued  Access Code: [email or web address]  URL: [email or web address]  Date: 12/19/2023  Prepared by: Mariajose Quintero     Exercises  - Supine Transversus Abdominis Bracing - Hands on Stomach  - 2 x daily - 5 x weekly - 1-2 sets - 10 reps - 3 hold  - Supine Single Knee to Chest  - 1-2 x daily - 7 x weekly - 3 reps - 20 hold  - Supine Hamstring Stretch  - 2 x daily - 7 x weekly - 15 reps - 10 hold either or,  - Supine Hamstring Stretch with Strap  - 2 x daily - 7 x weekly - 3 reps - 20 hold    ASSESSMENT:   Post session pain: better  Pt reports feeling no thoracic pain with less raise bridge     PLAN:  See how pt did with new " gamaliel and update hep prn  Neutral spine strengthening    OP PT PLAN:  Treatment/Interventions: Aquatic Therapy , Education/Instruction , Electrical Stimulation , Manual Therapy  , Neuromuscular re-education , Therapeutic activities , and Therapeutic exercise    PT Plan: Skilled PT   PT Frequency: 1-2 times per week  Duration: 5 weeks and reassess up to 15  Visits Approved: 20  Rehab Potential: Fair  Plan of Care Agreement: Patient         Goals:   STG: 10  Pt will report 25% reduction in pain 2-5/10 with activity -PROGRESSING  Pt will improve hamstring flexibility to min loss  -PROGRESSING   Pt will improve lumbar arom 25%  -PROGRESSING  Pt  will demo correct body mechanics with supine to sit transfers.>>>A     LTG 15  Pt will improve core/hip strength to 5/5.  -PROGRESSING  Pt will improve 10% with LESLEE scores indicating improved function,currently 14% LESLEE  -PROGRESSING

## 2024-01-09 ENCOUNTER — OFFICE VISIT (OUTPATIENT)
Dept: ORTHOPEDIC SURGERY | Facility: CLINIC | Age: 59
End: 2024-01-09
Payer: COMMERCIAL

## 2024-01-09 DIAGNOSIS — M43.16 SPONDYLOLISTHESIS OF LUMBAR REGION: ICD-10-CM

## 2024-01-09 DIAGNOSIS — M41.9 SCOLIOSIS, UNSPECIFIED SCOLIOSIS TYPE, UNSPECIFIED SPINAL REGION: ICD-10-CM

## 2024-01-09 DIAGNOSIS — M51.36 DISCOGENIC LOW BACK PAIN: Primary | ICD-10-CM

## 2024-01-09 PROCEDURE — 1036F TOBACCO NON-USER: CPT

## 2024-01-09 PROCEDURE — 3008F BODY MASS INDEX DOCD: CPT

## 2024-01-09 PROCEDURE — 99203 OFFICE O/P NEW LOW 30 MIN: CPT

## 2024-01-09 ASSESSMENT — PAIN SCALES - GENERAL: PAINLEVEL_OUTOF10: 2

## 2024-01-09 ASSESSMENT — PAIN - FUNCTIONAL ASSESSMENT: PAIN_FUNCTIONAL_ASSESSMENT: 0-10

## 2024-01-09 ASSESSMENT — PAIN DESCRIPTION - DESCRIPTORS: DESCRIPTORS: ACHING

## 2024-01-09 NOTE — PROGRESS NOTES
HPI:  Mikey is a pleasant 58-year-old male who presents today with concerns of his scoliosis.  He states he has had scoliosis since kvng high school, but in the past few years it has been worsening.  He states he has low back pain that is relatively constant and worsening, and this started after picking up a heavy motorcycle.  He points to his L4/5 area of his back.  He denies radiating pain, denies numbness and tingling.  He does massage, chiropractor treatment.  He recently started PT 2 weeks ago which involves stretching and core strengthening, and he believes is helping. He has not tried dry needling.  He takes an occasional Tylenol when needed, as well as Voltaren gel.  No other questions or concerns at time of visit.    ROS:  Reviewed on EMR and patient intake sheet.    PMH/SH:  Reviewed on EMR and patient intake sheet.    Exam:  MSK: Significant dextroscoliosis of thoracic spine on visual examination.  Full strength and range of motion of upper and lower extremities bilaterally.  General: No acute distress. Awake and conversant.  Eyes: Normal conjunctiva, anicteric. Round symmetric pupils.  ENT: Hearing grossly intact. No nasal discharge.  Neck: Neck is supple. No masses or thyromegaly.  Respiratory: Respirations are non-labored. No wheezing.  Skin: Warm. No rashes or ulcers.  Psych: Alert and oriented. Cooperative, appropriate mood and affect, normal judgement.  CV: No lower extremity edema.  Neuro: Sensation and CN II-XII grossly normal.    Radiology:     X-rays from 12/18/2023 imported and demonstrate:  FINDINGS:  There is significant dextroconvex scoliosis of the thoracic spine  centered around T7-T8 and T8-T9 levels. There are moderate multilevel  discogenic degenerative changes of thoracic spine, more pronounced at  T7-T8 and T8-T9 levels. There is mild superior endplate compression  deformities of L2 and L3 vertebral bodies, similar compared to prior  CT examination dated 06/30/2023. Otherwise rest  of the lumbar  vertebral body heights are maintained. There is grade 1  anterolisthesis of L5 over S1 vertebral body with suggestion of  bilateral pars defects. The rest of the visualized osseous skeleton  appears grossly unremarkable. No acute fracture deformity is noted.  Bilateral lungs are clear. Bowel gas pattern is nonobstructive. No  obvious soft tissue abnormality is noted.      IMPRESSION:  1. Dextroconvex scoliosis of the thoracic spine. Precise measurements  will be performed by orthopedic service.  2. Mild superior endplate compression fracture deformities of L2 and  L3 vertebral bodies, similar compared to prior CT examination dated  06/30/2023.  3. Grade 1 anterolisthesis of L5 over S1 vertebral body with  suggestion of bilateral pars defects.    Diagnosis:    Dextroscoliosis  Discogenic low back pain  Lumbar spondylolisthesis    Assessment and Plan:  Patient was seen today and evaluated for scoliosis which has been present since high school.  He states he would like to establish with a spine provider as he has not been seen in a few decades.  We discussed conservative management at this point and I recommend the patient continuing physical therapy with core strengthening, and I suggested adding dry needling treatment.  He should also continue massage therapy.  He may continue taking over the counter pain medication such as Tylenol and ibuprofen for his pain.  I did educated the patient that his pain may be chronic, but these conservative managements should help keep his pain at bay.  I discussed with the patient he should follow-up if he were to develop radicular pain, especially in the legs, due to his spondylolisthesis that we discussed while reviewing the x-ray.  He may follow-up on an as-needed basis.  Patient feels all questions were appropriately answered at time of visit today.  Patient agrees with plan above.    This note was dictated using speech recognition software and was not corrected for  spelling or grammatical errors    Navjot Krueger PA-C  Department of Orthopaedic Surgery  9:08 AM  01/09/24    [address] [place] [address]    Voicemail: [phone]   Appts: [phone]  Fax: [phone]

## 2024-01-11 ENCOUNTER — TREATMENT (OUTPATIENT)
Dept: PHYSICAL THERAPY | Facility: CLINIC | Age: 59
End: 2024-01-11
Payer: COMMERCIAL

## 2024-01-11 DIAGNOSIS — G89.29 CHRONIC BILATERAL LOW BACK PAIN, UNSPECIFIED WHETHER SCIATICA PRESENT: ICD-10-CM

## 2024-01-11 DIAGNOSIS — M54.50 CHRONIC BILATERAL LOW BACK PAIN, UNSPECIFIED WHETHER SCIATICA PRESENT: ICD-10-CM

## 2024-01-11 PROCEDURE — 97110 THERAPEUTIC EXERCISES: CPT | Mod: GP

## 2024-01-11 NOTE — PROGRESS NOTES
Physical Therapy    Physical Therapy Treatment    Patient Name: Mikey Salinas  MRN: [id number]  Today's Date: 1/11/2024    Time Calculation  Start Time: 0849  Stop Time: 0945  Time Calculation (min): 56 min    Visit #: 6 out of 10, 1/2024, ins 20 year   Evaluation date: 12/19/23    Current Problem:   1. Chronic bilateral low back pain, unspecified whether sciatica present  Follow Up In Physical Therapy        SUBJECTIVE:   Patient reports he is feeling good today. Split wood yesterday and felt a little stiff but is surprised he feels good today. Patient saw spine MD Tuesday and was told that he is doing right things and was not concerned, continue PT.     Pt will see spine MD on 9th suggested he consult with  MD re diagnostics prior to repeating inversion table.  Pt states his DC did some manipulations LB and said ok to do inversion table as long as not painful.  Pt did not feel pain, felt good.    Ok after last session some thoracic discomfort with bridge.-discussed with pt decreasing rom and making sure he is not pushing his head back into table    Precautions: Scoliosis 16 deg per pt  Cervical   GERD-abdominal pain  Anxiety-self reported-reports poor sleep, digestive issues   clotting disorder factor 2 prothrombin-on blood thinners -gentle with manual  NO DN  2014-blood clot left knee to lungs-broken up-slight lung scarring-no SOB  Bursitis of shoulders   Surgical History: lipoma removal end September-torso  XR:12/18/23:  FINDINGS:   There is significant dextroconvex scoliosis of the thoracic spine centered around T7-T8 and T8-T9 levels. There are moderate multilevel discogenic degenerative changes of thoracic spine, more pronounced at T7-T8 and T8-T9 levels. There is mild superior endplate compression deformities of L2 and L3 vertebral bodies, similar compared to prior CT examination dated 06/30/2023. Otherwise rest of the lumbar vertebral body heights are maintained. There is grade 1 anterolisthesis of L5 over S1  "vertebral body with suggestion of  bilateral pars defects. The rest of the visualized osseous skeleton appears grossly unremarkable. No acute fracture deformity is noted. Bilateral lungs are clear. Bowel gas pattern is nonobstructive. No obvious soft tissue abnormality is noted.      IMPRESSION:  1. Dextroconvex scoliosis of the thoracic spine. Precise measurements will be performed by orthopedic service.  2. Mild superior endplate compression fracture deformities of L2 and L3 vertebral bodies, similar compared to prior CT examination dated 06/30/2023.  3. Grade 1 anterolisthesis of L5 over S1 vertebral body with suggestion of bilateral pars defects.     Pain:   Start of Session: 1/10     OBJECTIVE:    LUMBAR ROM AROM    LEFT RIGHT   Sidebend WFL, pain free WFL, pain free   Rotation WFL, pain free WFL, pain free   Flexion WFL, pain free   Extension WFL, pain free     1/4/24: Tight hamstrings bilateral     Treatments:  Therapeutic Exercise: (56 minutes)  Recumbent bike L3 x8 mins  H/L TrA brace + alt march 2x10 ea.   H/L TrA brace + glute set + bridge (small range) 2x15  Figure 4 stretch 4x20 sec. Ea.   Clam shell 2x12 ea.   H/L nerve glide x15 ea.  6\" step up to SLS 2x10 ea.   Side steps for 25ft x6   Calf stretch in lunge position 5x10 sec. Ea.   Standing DL heel raise 2x15 (no UE)  SA cable row 60# in staggered stance 3x10 ea.       HEP:  Access Code: [email or web address]  URL: [email or web address]  Date: 01/11/2024  Prepared by: Danii    Exercises  - Sidestepping  - 1 x daily - 4 x weekly - 2 sets - 10 reps    Access Code: [email or web address]  URL: [email or web address]  Date: 12/28/2023  Prepared by: Danii    Exercises  - Supine Bridge  - 1 x daily - 4 x weekly - 2 sets - 10 reps  - Clamshell  - 1 x daily - 4 x weekly - 2 sets - 10 reps    pt educated to perform gamaliel as tolerated stop if greater pain or N/T,hand outs issued  Access Code: [email or web address]  URL: [email or web address]  Date: 12/19/2023  Prepared by: Mariajose" Quintero     Exercises  - Supine Transversus Abdominis Bracing - Hands on Stomach  - 2 x daily - 5 x weekly - 1-2 sets - 10 reps - 3 hold  - Supine Single Knee to Chest  - 1-2 x daily - 7 x weekly - 3 reps - 20 hold  - Supine Hamstring Stretch  - 2 x daily - 7 x weekly - 15 reps - 10 hold either or,  - Supine Hamstring Stretch with Strap  - 2 x daily - 7 x weekly - 3 reps - 20 hold    ASSESSMENT:   Patient demonstrated improved lumbar AROM to WFL in all planes pain free. Patient continues to be limited by hamstring flexibility, encouraged to perform intervention daily to improve overall mobility. Patient was able to progress interventions at this date improving spinal stability and LE strength.     Post session pain:feel good     PLAN:  See how pt did with new gamaliel and update hep prn  Neutral spine strengthening    OP PT PLAN:  Treatment/Interventions: Aquatic Therapy , Education/Instruction , Electrical Stimulation , Manual Therapy  , Neuromuscular re-education , Therapeutic activities , and Therapeutic exercise    PT Plan: Skilled PT   PT Frequency: 1-2 times per week  Duration: 5 weeks and reassess up to 15  Visits Approved: 20 visits per year  Rehab Potential: Fair  Plan of Care Agreement: Patient         Goals:   STG: 10  Pt will report 25% reduction in pain 2-5/10 with activity -PROGRESSING  Pt will improve hamstring flexibility to min loss  -PROGRESSING   Pt will improve lumbar arom 25%  -MET  Pt  will demo correct body mechanics with supine to sit transfers.>>>MET     LTG 15  Pt will improve core/hip strength to 5/5.  -PROGRESSING  Pt will improve 10% with LESLEE scores indicating improved function,currently 14% LESLEE  -PROGRESSING

## 2024-01-16 ENCOUNTER — TREATMENT (OUTPATIENT)
Dept: PHYSICAL THERAPY | Facility: CLINIC | Age: 59
End: 2024-01-16
Payer: COMMERCIAL

## 2024-01-16 DIAGNOSIS — G89.29 CHRONIC BILATERAL LOW BACK PAIN, UNSPECIFIED WHETHER SCIATICA PRESENT: ICD-10-CM

## 2024-01-16 DIAGNOSIS — M54.50 CHRONIC BILATERAL LOW BACK PAIN, UNSPECIFIED WHETHER SCIATICA PRESENT: ICD-10-CM

## 2024-01-16 PROCEDURE — 97110 THERAPEUTIC EXERCISES: CPT | Mod: GP | Performed by: PHYSICAL THERAPIST

## 2024-01-16 NOTE — PROGRESS NOTES
Physical Therapy    Physical Therapy Treatment    Patient Name: Mikey Salinas  MRN: [id number]  Today's Date: 1/16/2024    Time Calculation  Start Time: 0822  Stop Time: 0910  Time Calculation (min): 48 min    Visit #: 7 out of 10 POC, 3/2024, ins 20 year   Evaluation date: 12/19/23    Current Problem:   1. Chronic bilateral low back pain, unspecified whether sciatica present  Follow Up In Physical Therapy        SUBJECTIVE:   Maybe a 1/10, RT sided lower back, ok after his last session.  Pt states he followed up with spine specialist, doing everything right, no need to see him further unless, sx worsen, continue out his PT, massage and DC.    Precautions: Scoliosis 16 deg per pt  Cervical   GERD-abdominal pain  Anxiety-self reported-reports poor sleep, digestive issues   clotting disorder factor 2 prothrombin-on blood thinners -gentle with manual  NO DN  2014-blood clot left knee to lungs-broken up-slight lung scarring-no SOB  Bursitis of shoulders   Surgical History: lipoma removal end September-torso  XR:12/18/23:  FINDINGS:   There is significant dextroconvex scoliosis of the thoracic spine centered around T7-T8 and T8-T9 levels. There are moderate multilevel discogenic degenerative changes of thoracic spine, more pronounced at T7-T8 and T8-T9 levels. There is mild superior endplate compression deformities of L2 and L3 vertebral bodies, similar compared to prior CT examination dated 06/30/2023. Otherwise rest of the lumbar vertebral body heights are maintained. There is grade 1 anterolisthesis of L5 over S1 vertebral body with suggestion of  bilateral pars defects. The rest of the visualized osseous skeleton appears grossly unremarkable. No acute fracture deformity is noted. Bilateral lungs are clear. Bowel gas pattern is nonobstructive. No obvious soft tissue abnormality is noted.      IMPRESSION:  1. Dextroconvex scoliosis of the thoracic spine. Precise measurements will be performed by orthopedic service.  2.  "Mild superior endplate compression fracture deformities of L2 and L3 vertebral bodies, similar compared to prior CT examination dated 06/30/2023.  3. Grade 1 anterolisthesis of L5 over S1 vertebral body with suggestion of bilateral pars defects.     Pain:   Start of Session: 1/10     OBJECTIVE:    Today:Tight hams noted with 90/90 nerve glide    1/11/24  LUMBAR ROM AROM    LEFT RIGHT   Sidebend WFL, pain free WFL, pain free   Rotation WFL, pain free WFL, pain free   Flexion WFL, pain free   Extension WFL, pain free     1/4/24: Tight hamstrings bilateral     Treatments:  Therapeutic Exercise: (48  minutes)  Recumbent bike L3 x9 mins  H/L TrA brace + alt march 2x12 ea.   H/L TrA brace + glute set + bridge (small range) 2x15  Figure 4 stretch 3x20 sec.   Clam shell 2x13 ea.   H/L nerve glide x15 ea.  6\" step up to SLS x15 ea.   Side steps for 30 ft x4   Calf stretch in lunge position 3x15 sec. Ea.   Standing DL heel raise 2x15 (no UE)   row dark blue staggered stance x15 ea  Sh ext bilat with green x15 ea staggered  Palloff -x10,press with medium resistance light orange, could increase next session    HEP:  Access Code: [email or web address]  URL: [email or web address]  Date: 01/11/2024  Prepared by: Danii    Exercises  - Sidestepping  - 1 x daily - 4 x weekly - 2 sets - 10 reps    Access Code: [email or web address]  URL: [email or web address]  Date: 12/28/2023  Prepared by: Danii    Exercises  - Supine Bridge  - 1 x daily - 4 x weekly - 2 sets - 10 reps  - Clamshell  - 1 x daily - 4 x weekly - 2 sets - 10 reps    pt educated to perform gamaliel as tolerated stop if greater pain or N/T,hand outs issued  Access Code: [email or web address]  URL: [email or web address]  Date: 12/19/2023  Prepared by: Mariajose Quintero     Exercises  - Supine Transversus Abdominis Bracing - Hands on Stomach  - 2 x daily - 5 x weekly - 1-2 sets - 10 reps - 3 hold  - Supine Single Knee to Chest  - 1-2 x daily - 7 x weekly - 3 reps - 20 hold  - Supine Hamstring Stretch  " - 2 x daily - 7 x weekly - 15 reps - 10 hold either or,  - Supine Hamstring Stretch with Strap  - 2 x daily - 7 x weekly - 3 reps - 20 hold    ASSESSMENT:   Patient continues to be limited by hamstring flexibility.  Patient was able to progress interventions at this date improving spinal stability in standing with derotation activities.    Post session pain:feel good     PLAN:  Pt will be out of town 3 weeks and follow up when   See how pt did with new gamaliel and update hep prn  Neutral spine strengthening    OP PT PLAN:  Treatment/Interventions: Aquatic Therapy , Education/Instruction , Electrical Stimulation , Manual Therapy  , Neuromuscular re-education , Therapeutic activities , and Therapeutic exercise    PT Plan: Skilled PT   PT Frequency: 1-2 times per week  Duration: 5 weeks and reassess up to 15  Visits Approved: 20 visits per year  Rehab Potential: Fair  Plan of Care Agreement: Patient         Goals:   STG: 10  Pt will report 25% reduction in pain 2-5/10 with activity -PROGRESSING  Pt will improve hamstring flexibility to min loss  -PROGRESSING   Pt will improve lumbar arom 25%  -MET  Pt  will demo correct body mechanics with supine to sit transfers.>>>MET     LTG 15  Pt will improve core/hip strength to 5/5.  -PROGRESSING  Pt will improve 10% with LESLEE scores indicating improved function,currently 14% LESLEE  -PROGRESSING

## 2024-02-06 ENCOUNTER — TREATMENT (OUTPATIENT)
Dept: PHYSICAL THERAPY | Facility: CLINIC | Age: 59
End: 2024-02-06
Payer: COMMERCIAL

## 2024-02-06 ENCOUNTER — ALLIED HEALTH (OUTPATIENT)
Dept: INTEGRATIVE MEDICINE | Facility: CLINIC | Age: 59
End: 2024-02-06

## 2024-02-06 DIAGNOSIS — F41.9 ANXIETY: Primary | ICD-10-CM

## 2024-02-06 DIAGNOSIS — K21.9 GASTROESOPHAGEAL REFLUX DISEASE WITH HIATAL HERNIA: ICD-10-CM

## 2024-02-06 DIAGNOSIS — K63.8219 SMALL INTESTINAL BACTERIAL OVERGROWTH (SIBO): ICD-10-CM

## 2024-02-06 DIAGNOSIS — G89.29 CHRONIC BILATERAL LOW BACK PAIN, UNSPECIFIED WHETHER SCIATICA PRESENT: ICD-10-CM

## 2024-02-06 DIAGNOSIS — M54.50 CHRONIC BILATERAL LOW BACK PAIN, UNSPECIFIED WHETHER SCIATICA PRESENT: ICD-10-CM

## 2024-02-06 DIAGNOSIS — K44.9 GASTROESOPHAGEAL REFLUX DISEASE WITH HIATAL HERNIA: ICD-10-CM

## 2024-02-06 PROCEDURE — 97811 ACUP 1/> W/O ESTIM EA ADD 15: CPT | Performed by: NATUROPATH

## 2024-02-06 PROCEDURE — 97810 ACUP 1/> WO ESTIM 1ST 15 MIN: CPT | Performed by: NATUROPATH

## 2024-02-06 PROCEDURE — 97110 THERAPEUTIC EXERCISES: CPT | Mod: GP | Performed by: PHYSICAL THERAPIST

## 2024-02-06 NOTE — PROGRESS NOTES
Acupuncture Visit:     Subjective   Patient ID: Mikey Slainas is a 58 y.o. male who presents for No chief complaint on file.  Little to no discomfort in abdomen  Still tightness in upper abdomen  Saw Dr. Ontiveros, on zyfaxin again  On low fodmap diet again  Probiotic- 1 capsule TID  BM- more firm, but in pieces, 2-3  Gas- less  Sleep- waking often in middle of the night. , tinnitus keeps awake,  seeing ENT    Scoliosis, doing PT    Supps-   Probiotics  Gi Microbx  Vitamins            Session Information  Is this acupuncture treatment being billed to the patient's insurance company: No  Visit Type: Follow-up visit  Medical History Reviewed: I have reviewed pertinent medical history in EHR, and no contraindications are present to provide treatment               Review of Systems         Provider reviewed plan for the acupuncture session, precautions and contraindications. Patient/guardian/hospital staff has given consent to treat with full understanding of what to expect during the session. Before acupuncture began, provider explained to the patient to communicate at any time if the procedure was causing discomfort past their tolerance level. Patient agreed to advise acupuncturist. The acupuncturist counseled the patient on the risks of acupuncture treatment including pain, infection, bleeding, and no relief of pain. The patient was positioned comfortably. There was no evidence of infection at the site of needle insertions.    Objective   Physical Exam       Acupuncture Treatment  Body Points - Bilateral: Du 20,, LI11, SJ7, LI4, ST25, Srinath 12SP9, St36, SP6, KI7, LR3, SP3  Auricular Points: Yes  Auricular Points - Bilateral: ear ramirez men, pt 0  Other Techniques Utilized: TDP Lamp  TDP Lamp Descripton: abdomen  Needle Count In: 26  Needle Count Out: 26  Needle Retention Time (min): 25 minutes  Total Face to Face Time (min): 25 minutes                      Assessment/Plan   Diagnoses and all orders for this  visit:  Anxiety (Primary)  Gastroesophageal reflux disease with hiatal hernia  Small intestinal bacterial overgrowth (SIBO)

## 2024-02-06 NOTE — PROGRESS NOTES
Physical Therapy    Physical Therapy Treatment    Patient Name: Mikey Salinas  MRN: [id number]  Today's Date: 2/6/2024    Time Calculation  Start Time: 0830  Stop Time: 0910  Time Calculation (min): 40 min    Visit #: 8 out of 10 POC, 3/2024, ins 20 year   Evaluation date: 12/19/23    Current Problem:   1. Chronic bilateral low back pain, unspecified whether sciatica present  Follow Up In Physical Therapy        SUBJECTIVE:   Maybe a 1/10, central  lower back, ok after his last session.  Pt states he followed up with spine specialist, doing everything right, no need to see him further unless, sx worsen, continue out his PT, massage and DC.  Pt reports with having GI kick up some hasn't been as compliant with hep  Precautions: Scoliosis 16 deg per pt  Cervical   GERD-abdominal pain  Anxiety-self reported-reports poor sleep, digestive issues   clotting disorder factor 2 prothrombin-on blood thinners -gentle with manual  NO DN  2014-blood clot left knee to lungs-broken up-slight lung scarring-no SOB  Bursitis of shoulders   Surgical History: lipoma removal end September-torso  XR:12/18/23:  FINDINGS:   There is significant dextroconvex scoliosis of the thoracic spine centered around T7-T8 and T8-T9 levels. There are moderate multilevel discogenic degenerative changes of thoracic spine, more pronounced at T7-T8 and T8-T9 levels. There is mild superior endplate compression deformities of L2 and L3 vertebral bodies, similar compared to prior CT examination dated 06/30/2023. Otherwise rest of the lumbar vertebral body heights are maintained. There is grade 1 anterolisthesis of L5 over S1 vertebral body with suggestion of  bilateral pars defects. The rest of the visualized osseous skeleton appears grossly unremarkable. No acute fracture deformity is noted. Bilateral lungs are clear. Bowel gas pattern is nonobstructive. No obvious soft tissue abnormality is noted.      IMPRESSION:  1. Dextroconvex scoliosis of the thoracic  "spine. Precise measurements will be performed by orthopedic service.  2. Mild superior endplate compression fracture deformities of L2 and L3 vertebral bodies, similar compared to prior CT examination dated 06/30/2023.  3. Grade 1 anterolisthesis of L5 over S1 vertebral body with suggestion of bilateral pars defects.     Pain:   Start of Session: 1/10     OBJECTIVE:    Today:Tight hams noted with 90/90 nerve glide    1/11/24  LUMBAR ROM AROM    LEFT RIGHT   Sidebend WFL, pain free WFL, pain free   Rotation WFL, pain free WFL, pain free   Flexion WFL, pain free   Extension WFL, pain free       Treatments:  Therapeutic Exercise: ( minutes)  Recumbent bike L3 x 5, L4 min x 3 mins  H/L TrA brace + alt march 2x 13 ea.   H/L TrA brace + glute set + bridge (small range) 2x15  Figure 4 stretch 3 x 20 sec.   Clam shell 2x13 ea.   H/L nerve glide x15 ea.  6\" step up to SLS x15 ea.   Side steps for 30 ft x4   Heel raises bilateral 2 x 10  Calf stretch slant board 3 x 20   Standing DL heel raise 2x15 (no UE)  row wt stack-48#-staggered stance x15 ea  Sh extension bilat with green x15 ea staggered  Palloff press -x10,press with medium resistance green     HEP:  Access Code: [email or web address]  URL: [email or web address]  Date: 01/11/2024  Prepared by: Danii    Exercises  - Sidestepping  - 1 x daily - 4 x weekly - 2 sets - 10 reps    Access Code: [email or web address]  URL: [email or web address]  Date: 12/28/2023  Prepared by: Danii  Exercises  - Supine Bridge  - 1 x daily - 4 x weekly - 2 sets - 10 reps  - Clamshell  - 1 x daily - 4 x weekly - 2 sets - 10 reps    pt educated to perform gamaliel as tolerated stop if greater pain or N/T,hand outs issued  Access Code: [email or web address]  URL: [email or web address]  Date: 12/19/2023  Prepared by: Mariajose Quintero     Exercises  - Supine Transversus Abdominis Bracing - Hands on Stomach  - 2 x daily - 5 x weekly - 1-2 sets - 10 reps - 3 hold  - Supine Single Knee to Chest  - 1-2 x daily - 7 x weekly - 3 " reps - 20 hold  - Supine Hamstring Stretch  - 2 x daily - 7 x weekly - 15 reps - 10 hold either or,  - Supine Hamstring Stretch with Strap  - 2 x daily - 7 x weekly - 3 reps - 20 hold    ASSESSMENT:   Patient continues to be limited by hamstring flexibility.  Patient was able to progress interventions at this date improving spinal stability in standing with derotation activities.    Post session pain:feel good     PLAN:    Neutral spine strengthening    OP PT PLAN:  Treatment/Interventions: Aquatic Therapy , Education/Instruction , Electrical Stimulation , Manual Therapy  , Neuromuscular re-education , Therapeutic activities , and Therapeutic exercise    PT Plan: Skilled PT   PT Frequency: 1-2 times per week  Duration: 5 weeks and reassess up to 15  Visits Approved: 20 visits per year  Rehab Potential: Fair  Plan of Care Agreement: Patient         Goals:   STG: 10  Pt will report 25% reduction in pain 2-5/10 with activity -PROGRESSING  Pt will improve hamstring flexibility to min loss  -PROGRESSING   Pt will improve lumbar arom 25%  -MET  Pt  will demo correct body mechanics with supine to sit transfers.>>>MET     LTG 15  Pt will improve core/hip strength to 5/5.  -PROGRESSING  Pt will improve 10% with LESLEE scores indicating improved function,currently 14% LESLEE  -PROGRESSING

## 2024-02-19 ENCOUNTER — APPOINTMENT (OUTPATIENT)
Dept: PHYSICAL THERAPY | Facility: CLINIC | Age: 59
End: 2024-02-19
Payer: COMMERCIAL

## 2024-02-21 ENCOUNTER — TREATMENT (OUTPATIENT)
Dept: PHYSICAL THERAPY | Facility: CLINIC | Age: 59
End: 2024-02-21
Payer: COMMERCIAL

## 2024-02-21 DIAGNOSIS — M54.50 CHRONIC BILATERAL LOW BACK PAIN, UNSPECIFIED WHETHER SCIATICA PRESENT: ICD-10-CM

## 2024-02-21 DIAGNOSIS — G89.29 CHRONIC BILATERAL LOW BACK PAIN, UNSPECIFIED WHETHER SCIATICA PRESENT: ICD-10-CM

## 2024-02-21 PROCEDURE — 97110 THERAPEUTIC EXERCISES: CPT | Mod: GP | Performed by: PHYSICAL THERAPIST

## 2024-02-21 NOTE — PROGRESS NOTES
Physical Therapy    Physical Therapy Treatment    Patient Name: Mikey Salinas  MRN: [id number]  Today's Date: 2/21/2024    Time Calculation  Start Time: 1015  Stop Time: 1100  Time Calculation (min): 45 min    Visit #: 10 out of 10 POC, 3/2024, ins 20 year   Evaluation date: 12/19/23    Current Problem:   1. Chronic bilateral low back pain, unspecified whether sciatica present  Follow Up In Physical Therapy        SUBJECTIVE:   Pt states he added matched heel groin stretch without issue   Maybe a 1/10, central  lower back, ok after his last session.  past  Pt states he followed up with spine specialist, doing everything right, no need to see him further unless, sx worsen, continue out his PT, massage and DC.  Pt reports with having GI kick up, at times has difficulty pulling abdomen in, will see his specialist today  Precautions: Scoliosis 16 deg per pt  Cervical   GERD-abdominal pain  Anxiety-self reported-reports poor sleep, digestive issues   clotting disorder factor 2 prothrombin-on blood thinners -gentle with manual  NO DN  2014-blood clot left knee to lungs-broken up-slight lung scarring-no SOB  Bursitis of shoulders   Surgical History: lipoma removal end September-torso  XR:12/18/23:  FINDINGS:   There is significant dextroconvex scoliosis of the thoracic spine centered around T7-T8 and T8-T9 levels. There are moderate multilevel discogenic degenerative changes of thoracic spine, more pronounced at T7-T8 and T8-T9 levels. There is mild superior endplate compression deformities of L2 and L3 vertebral bodies, similar compared to prior CT examination dated 06/30/2023. Otherwise rest of the lumbar vertebral body heights are maintained. There is grade 1 anterolisthesis of L5 over S1 vertebral body with suggestion of  bilateral pars defects. The rest of the visualized osseous skeleton appears grossly unremarkable. No acute fracture deformity is noted. Bilateral lungs are clear. Bowel gas pattern is nonobstructive. No  "obvious soft tissue abnormality is noted.      IMPRESSION:  1. Dextroconvex scoliosis of the thoracic spine. Precise measurements will be performed by orthopedic service.  2. Mild superior endplate compression fracture deformities of L2 and L3 vertebral bodies, similar compared to prior CT examination dated 06/30/2023.  3. Grade 1 anterolisthesis of L5 over S1 vertebral body with suggestion of bilateral pars defects.     Pain:   Start of Session: 1/10     OBJECTIVE:    Today:Tight hams noted with 90/90 nerve glide, good core control with paloff press    2/21/24  LUMBAR ROM AROM    LEFT RIGHT   Sidebend WFL, pain free WFL, pain free   Rotation WFL, pain free WFL, pain free   Flexion WFL, pain free   Extension WFL,, neutral no pain       Treatments:  Therapeutic Exercise: ( minutes)  Recumbent bike L4-5min x 5 mins  Stand hip ABD SLR hands pressing down on swill ball on table   H/L TrA brace + alt march 2x 14 ea.   H/L TrA brace + glute set 2x15  Figure 4 stretch 3 x 20 sec.   Clam shell 2x15 ea.   H/L nerve glide x15 ea.  6\" step up to SLS x15 ea. DNP  Side steps for 30 ft x4   Heel raises bilateral 2 x 12  Calf stretch slant board 3 x 20   Standing DL heel raise 2x15 (no UE)  row wt stack-48#-staggered stance x2 ea  Sh extension bilat with green x15 ea staggered  Palloff press -2x10,ea press with medium resistance green     HEP:  Access Code: [email or web address]  URL: [email or web address]  Date: 01/11/2024  Prepared by: Danii    Exercises  - Sidestepping  - 1 x daily - 4 x weekly - 2 sets - 10 reps    Access Code: [email or web address]  URL: [email or web address]  Date: 12/28/2023  Prepared by: Danii  Exercises  - Supine Bridge  - 1 x daily - 4 x weekly - 2 sets - 10 reps  - Clamshell  - 1 x daily - 4 x weekly - 2 sets - 10 reps    pt educated to perform gamaliel as tolerated stop if greater pain or N/T,hand outs issued  Access Code: [email or web address]  URL: [email or web address]  Date: 12/19/2023  Prepared by: Mariajose Quintero   "   Exercises  - Supine Transversus Abdominis Bracing - Hands on Stomach  - 2 x daily - 5 x weekly - 1-2 sets - 10 reps - 3 hold  - Supine Single Knee to Chest  - 1-2 x daily - 7 x weekly - 3 reps - 20 hold  - Supine Hamstring Stretch  - 2 x daily - 7 x weekly - 15 reps - 10 hold either or,  - Supine Hamstring Stretch with Strap  - 2 x daily - 7 x weekly - 3 reps - 20 hold    ASSESSMENT:   See goal achievement  Patient continues to be limited by hamstring flexibility-but improving .  Patient was able to progress interventions at this date improving spinal stability in standing with derotation activities. Hip abductors challenged with standing work.    Post session pain:feel good     PLAN: continue 1x week to every other for up to 5 sessions  Add band to clam shell  Neutral spine strengthening    OP PT PLAN:  Treatment/Interventions: Aquatic Therapy , Education/Instruction , Electrical Stimulation , Manual Therapy  , Neuromuscular re-education , Therapeutic activities , and Therapeutic exercise    PT Plan: Skilled PT   PT Frequency: 1-2 times per week  Duration: 5 weeks and reassess up to 15  Visits Approved: 20 visits per year  Rehab Potential: Fair  Plan of Care Agreement: Patient         Goals:   STG: 10  Pt will report 25% reduction in pain 2-5/10 with activity>>>A  Pt will improve hamstring flexibility to min loss  -PROGRESSING but still tight  Pt will improve lumbar arom 25%  -MET  Pt  will demo correct body mechanics with supine to sit transfers.>>>MET     LTG 15  Pt will improve core/hip strength to 5/5.  -PROGRESSING  Pt will improve 10% with LESLEE scores indicating improved function,currently 14% LESLEE  -PROGRESSING

## 2024-03-04 ENCOUNTER — CLINICAL SUPPORT (OUTPATIENT)
Dept: AUDIOLOGY | Facility: CLINIC | Age: 59
End: 2024-03-04
Payer: COMMERCIAL

## 2024-03-04 DIAGNOSIS — H93.13 TINNITUS OF BOTH EARS: ICD-10-CM

## 2024-03-04 DIAGNOSIS — H90.3 SENSORINEURAL HEARING LOSS (SNHL) OF BOTH EARS: Primary | ICD-10-CM

## 2024-03-04 PROCEDURE — 92557 COMPREHENSIVE HEARING TEST: CPT | Performed by: AUDIOLOGIST

## 2024-03-04 PROCEDURE — 92550 TYMPANOMETRY & REFLEX THRESH: CPT | Performed by: AUDIOLOGIST

## 2024-03-04 ASSESSMENT — PAIN SCALES - GENERAL: PAINLEVEL_OUTOF10: 2

## 2024-03-04 ASSESSMENT — ENCOUNTER SYMPTOMS: OCCASIONAL FEELINGS OF UNSTEADINESS: 0

## 2024-03-04 ASSESSMENT — PAIN - FUNCTIONAL ASSESSMENT: PAIN_FUNCTIONAL_ASSESSMENT: 0-10
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
## 2024-03-05 ENCOUNTER — ALLIED HEALTH (OUTPATIENT)
Dept: INTEGRATIVE MEDICINE | Facility: CLINIC | Age: 59
End: 2024-03-05

## 2024-03-05 DIAGNOSIS — K63.8219 SMALL INTESTINAL BACTERIAL OVERGROWTH (SIBO): Primary | ICD-10-CM

## 2024-03-05 DIAGNOSIS — F41.9 ANXIETY: ICD-10-CM

## 2024-03-05 PROCEDURE — 97811 ACUP 1/> W/O ESTIM EA ADD 15: CPT | Performed by: NATUROPATH

## 2024-03-05 PROCEDURE — 97810 ACUP 1/> WO ESTIM 1ST 15 MIN: CPT | Performed by: NATUROPATH

## 2024-03-05 NOTE — PROGRESS NOTES
Acupuncture Visit:     Subjective   Patient ID: Mikey Salinas is a 58 y.o. male who presents for No chief complaint on file.  Feels improvement  No pain, but some pressure still  One more round of zyfaxin  BM- normal, daily, more solid  Has broadened from low fodmap  Gas less  Sleep- off and on, still waking often, may be from back.                             Review of Systems         Provider reviewed plan for the acupuncture session, precautions and contraindications. Patient/guardian/hospital staff has given consent to treat with full understanding of what to expect during the session. Before acupuncture began, provider explained to the patient to communicate at any time if the procedure was causing discomfort past their tolerance level. Patient agreed to advise acupuncturist. The acupuncturist counseled the patient on the risks of acupuncture treatment including pain, infection, bleeding, and no relief of pain. The patient was positioned comfortably. There was no evidence of infection at the site of needle insertions.    Objective   Physical Exam       Acupuncture Treatment  Body Points - Bilateral: Du 20,, LI11, SJ7, LI4, ST25, Srinath 12SP9, St36, SP6, KI7, LR3, SP3  Auricular Points: Yes  Auricular Points - Bilateral: ear ramirez men, pt 0  Other Techniques Utilized: TDP Lamp  TDP Lamp Descripton: abdomen  Needle Count In: 26  Needle Count Out: 26  Needle Retention Time (min): 25 minutes  Total Face to Face Time (min): 25 minutes                      Assessment/Plan   Diagnoses and all orders for this visit:  Small intestinal bacterial overgrowth (SIBO) (Primary)  Anxiety

## 2024-03-06 ENCOUNTER — OFFICE VISIT (OUTPATIENT)
Dept: OTOLARYNGOLOGY | Facility: CLINIC | Age: 59
End: 2024-03-06
Payer: COMMERCIAL

## 2024-03-06 DIAGNOSIS — H93.19 TINNITUS, UNSPECIFIED LATERALITY: ICD-10-CM

## 2024-03-06 PROCEDURE — 99203 OFFICE O/P NEW LOW 30 MIN: CPT | Performed by: OTOLARYNGOLOGY

## 2024-03-06 PROCEDURE — 1036F TOBACCO NON-USER: CPT | Performed by: OTOLARYNGOLOGY

## 2024-03-06 PROCEDURE — 3008F BODY MASS INDEX DOCD: CPT | Performed by: OTOLARYNGOLOGY

## 2024-03-06 NOTE — LETTER
"March 6, 2024     Felipe Salcedo MD  [address]  [place] [address]  [facility], [facility] [address]    Patient: Mikey Salinas   YOB: 1965   Date of Visit: 3/6/2024       Dear Dr. Felipe Salcedo MD:    Thank you for referring Mikey Salinas to me for evaluation. Below are my notes for this consultation.  If you have questions, please do not hesitate to call me. I look forward to following your patient along with you.       Sincerely,     Rian Dobbins MD      CC: No Recipients  ______________________________________________________________________________________    History Of Present Illness  Mikey Salinas is a 58 y.o. male presenting with: \"Constant ringing in the ears\".  He is kindly referred by Dr. Felipe Salcedo. He has tinnitus since November 2024. He was shooting.     On examination TMs intact.   Hearing test shows bilateral sensorineural type of hearing loss at high frequencies.  My impression patient had acoustic trauma on top of age-related hearing loss. Trauma has happened about 4 months ago, so it's late for intratympanic steroid injections.    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    Surgical History  He has a past surgical history that includes Knee arthroscopy w/ debridement (02/02/2015); MR angio head wo IV contrast (12/15/2016); Lipoma resection (N/A, " 09/2023); and [place] tooth extraction (1982).     Social History  He reports that he has never smoked. He has never been exposed to tobacco smoke. He has never used smokeless tobacco. He reports current alcohol use of about 2.0 standard drinks of alcohol per week. He reports that he does not use drugs.    Family History  Family History   Problem Relation Name Age of Onset   • Macular degeneration Mother Megha    • Osteoporosis Mother Megha    • Transient ischemic attack Mother Megha    • Brain Aneurysm Mother Megha    • Hypertension Mother Megha    • Other (Heart valve surgery) Father Ray    • Other (RHEUMATIC HEART DISEASE) Father Ray    • Nephrolithiasis Father Ray    • Heart disease Father Ray    • Hypertension Father Ray    • Breast cancer Sister Peg    • Irritable bowel syndrome Sister Peg    • ROGER disease Sister Peg    • Hypertension Sister Peg    • Cancer Sister Peg    • Prostate cancer Brother     • Cancer Other GRAND FATHER    • Cancer Brother Yves Salinas         Allergies  Chocolate flavor, Cocoa, and Penicillins    Review of Systems   Tinnitus       Physical Exam    General appearance: Healthy-appearing, well-nourished, well groomed, in no acute distress.     Head and Face: Atraumatic with no masses, lesions, or scarring.      Facial strength: Normal strength and symmetry, no synkinesis or facial tic.     Eyes: Conjunctivas look non-hyperemic bilaterally    Ears: Bilaterally ear canals look normal. Tympanic membranes look intact, no hyperemia, fluid or retraction. Hearing grossly normal.      Nose: Mucosa looks normal. No purulent discharge.     Oral Cavity/Mouth: Lips and tongue look normal.     Throat: No postnasal discharge. No tonsil hypertrophy. No hyperemia.    Neck: Looks symmetrical,    Pulmonary: Normal respiratory effort.     Neurological/Psychiatric Orientation to person, place, and time: Normal.     Mood and affect: Normal.      Extremities: No clubbing.     Skin: No significant skin lesions  "were noted at face or neck        Procedure  Assessment and Plan:  Mikey Salinas is a 58 y.o. male presenting with: \"Constant ringing in the ears\".  He is kindly referred by Dr. Felipe Salcedo. He has tinnitus since November 2024. He was shooting.     On examination TMs intact.   Hearing test shows bilateral sensorineural type of hearing loss at high frequencies.  My impression patient had acoustic trauma on top of age-related hearing loss. Trauma has happened about 4 months ago, so it's late for intratympanic steroid injections.    Recommendation  1-consider lenire device for tinnitus          Last Recorded Vitals  There were no vitals taken for this visit.    Relevant Results      Rian Dobbins  Otolaryngology - Head & Neck Surgery    "

## 2024-03-06 NOTE — PROGRESS NOTES
"History Of Present Illness  Mikey Salinas is a 58 y.o. male presenting with: \"Constant ringing in the ears\".  He is kindly referred by Dr. Felipe Salcedo. He has tinnitus since November 2024. He was shooting.     On examination TMs intact.   Hearing test shows bilateral sensorineural type of hearing loss at high frequencies.  My impression patient had acoustic trauma on top of age-related hearing loss. Trauma has happened about 4 months ago, so it's late for intratympanic steroid injections.    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    Surgical History  He has a past surgical history that includes Knee arthroscopy w/ debridement (02/02/2015); MR angio head wo IV contrast (12/15/2016); Lipoma resection (N/A, 09/2023); and [place] tooth extraction (1982).     Social History  He reports that he has never smoked. He has never been exposed to tobacco smoke. He has never used smokeless tobacco. He reports current alcohol use of about 2.0 standard drinks of alcohol per week. He reports that he does not use drugs.    Family History  Family History   Problem Relation Name Age of Onset    Macular degeneration Mother Megha     Osteoporosis Mother Megha     Transient ischemic attack Mother Megha     Brain Aneurysm Mother Megha     Hypertension Mother Megha     Other (Heart valve " "surgery) Father Ray     Other (RHEUMATIC HEART DISEASE) Father Ray     Nephrolithiasis Father Ray     Heart disease Father Ray     Hypertension Father Ray     Breast cancer Sister Peg     Irritable bowel syndrome Sister Peg     ROGER disease Sister Peg     Hypertension Sister Peg     Cancer Sister Peg     Prostate cancer Brother      Cancer Other GRAND FATHER     Cancer Brother Yves Salinas         Allergies  Chocolate flavor, Cocoa, and Penicillins    Review of Systems   Tinnitus       Physical Exam    General appearance: Healthy-appearing, well-nourished, well groomed, in no acute distress.     Head and Face: Atraumatic with no masses, lesions, or scarring.      Facial strength: Normal strength and symmetry, no synkinesis or facial tic.     Eyes: Conjunctivas look non-hyperemic bilaterally    Ears: Bilaterally ear canals look normal. Tympanic membranes look intact, no hyperemia, fluid or retraction. Hearing grossly normal.      Nose: Mucosa looks normal. No purulent discharge.     Oral Cavity/Mouth: Lips and tongue look normal.     Throat: No postnasal discharge. No tonsil hypertrophy. No hyperemia.    Neck: Looks symmetrical,    Pulmonary: Normal respiratory effort.     Neurological/Psychiatric Orientation to person, place, and time: Normal.     Mood and affect: Normal.      Extremities: No clubbing.     Skin: No significant skin lesions were noted at face or neck        Procedure  Assessment and Plan:  Mikey Salinas is a 58 y.o. male presenting with: \"Constant ringing in the ears\".  He is kindly referred by Dr. Felipe Salcedo. He has tinnitus since November 2024. He was shooting.     On examination TMs intact.   Hearing test shows bilateral sensorineural type of hearing loss at high frequencies.  My impression patient had acoustic trauma on top of age-related hearing loss. Trauma has happened about 4 months ago, so it's late for intratympanic steroid injections.    Recommendation  1-consider lenire device for " tinnitus          Last Recorded Vitals  There were no vitals taken for this visit.    Relevant Results      Rian Dobbins  Otolaryngology - Head & Neck Surgery

## 2024-03-18 ENCOUNTER — APPOINTMENT (OUTPATIENT)
Dept: HEMATOLOGY/ONCOLOGY | Facility: CLINIC | Age: 59
End: 2024-03-18
Payer: COMMERCIAL

## 2024-03-19 ENCOUNTER — LAB (OUTPATIENT)
Dept: LAB | Facility: LAB | Age: 59
End: 2024-03-19
Payer: COMMERCIAL

## 2024-03-19 ENCOUNTER — TREATMENT (OUTPATIENT)
Dept: PHYSICAL THERAPY | Facility: CLINIC | Age: 59
End: 2024-03-19
Payer: COMMERCIAL

## 2024-03-19 ENCOUNTER — APPOINTMENT (OUTPATIENT)
Dept: PRIMARY CARE | Facility: CLINIC | Age: 59
End: 2024-03-19
Payer: COMMERCIAL

## 2024-03-19 DIAGNOSIS — M54.50 CHRONIC BILATERAL LOW BACK PAIN, UNSPECIFIED WHETHER SCIATICA PRESENT: ICD-10-CM

## 2024-03-19 DIAGNOSIS — S80.262D: ICD-10-CM

## 2024-03-19 DIAGNOSIS — G89.29 CHRONIC BILATERAL LOW BACK PAIN, UNSPECIFIED WHETHER SCIATICA PRESENT: ICD-10-CM
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
## 2024-03-19 PROCEDURE — 97110 THERAPEUTIC EXERCISES: CPT | Mod: GP,CQ

## 2024-03-19 PROCEDURE — 86618 LYME DISEASE ANTIBODY: CPT

## 2024-03-19 PROCEDURE — 36415 COLL VENOUS BLD VENIPUNCTURE: CPT
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
## 2024-03-22 LAB — B BURGDOR.VLSE1+PEPC10 AB SER IA-ACNC: 0.21 IV

## 2024-04-03 ENCOUNTER — OFFICE VISIT (OUTPATIENT)
Dept: PRIMARY CARE | Facility: CLINIC | Age: 59
End: 2024-04-03
Payer: COMMERCIAL

## 2024-04-03 VITALS
OXYGEN SATURATION: 95 % | WEIGHT: 216 LBS | SYSTOLIC BLOOD PRESSURE: 130 MMHG | HEART RATE: 75 BPM | DIASTOLIC BLOOD PRESSURE: 86 MMHG | TEMPERATURE: 97.7 F | BODY MASS INDEX: 30.99 KG/M2

## 2024-04-03 DIAGNOSIS — F41.8 ANXIETY ABOUT HEALTH: ICD-10-CM

## 2024-04-03 DIAGNOSIS — N63.23 MASS OF LOWER OUTER QUADRANT OF LEFT BREAST: Primary | ICD-10-CM

## 2024-04-03 PROCEDURE — 99214 OFFICE O/P EST MOD 30 MIN: CPT | Performed by: FAMILY MEDICINE

## 2024-04-03 PROCEDURE — 3008F BODY MASS INDEX DOCD: CPT | Performed by: FAMILY MEDICINE

## 2024-04-03 PROCEDURE — 1036F TOBACCO NON-USER: CPT | Performed by: FAMILY MEDICINE

## 2024-04-03 RX ORDER — DULOXETIN HYDROCHLORIDE 30 MG/1
30 CAPSULE, DELAYED RELEASE ORAL DAILY
Qty: 30 CAPSULE | Refills: 5 | Status: SHIPPED | OUTPATIENT
Start: 2024-04-03 | End: 2024-09-30

## 2024-04-03 RX ORDER — HYDROXYZINE HYDROCHLORIDE 25 MG/1
25 TABLET, FILM COATED ORAL EVERY 8 HOURS PRN
Qty: 60 TABLET | Refills: 0 | Status: SHIPPED | OUTPATIENT
Start: 2024-04-03 | End: 2024-05-03

## 2024-04-03 ASSESSMENT — PAIN SCALES - GENERAL: PAINLEVEL: 1

## 2024-04-03 ASSESSMENT — PATIENT HEALTH QUESTIONNAIRE - PHQ9
2. FEELING DOWN, DEPRESSED OR HOPELESS: NOT AT ALL
1. LITTLE INTEREST OR PLEASURE IN DOING THINGS: NOT AT ALL
SUM OF ALL RESPONSES TO PHQ9 QUESTIONS 1 AND 2: 0

## 2024-04-03 NOTE — PROGRESS NOTES
History Of Present Illness  Mikey Salinas is a 58 y.o. male presenting for Follow-up (Pt has c/o lumps on the left side of chest. States he first noticed it one month ago. States he does feel pain in the chest area on and off. )  .    Noticed left breast lump about a month ago when he had some anxiety and felt some chest discomfort.  He was feeling his chest and incidentally noted it.  There is been no change.  It is nontender.  No nipple discharge or skin changes.  No fever, chills, night sweats or weight loss.  He has a family history of a sister with breast cancer.  No recent vaccination.    He has had a lot going on with abdominal discomfort and seeing gastroenterology with negative workup so far.  He admits he has been very anxious about his health.  Had a panic attack while traveling a few weeks ago.         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    diclofenac sodium (Voltaren) 1 % gel gel Apply topically.    fluticasone (Flonase Allergy Relief) 50 mcg/actuation nasal spray Administer 1 spray into each nostril once daily.    L.acid/L.casei/B.bif/B.kenroy/FOS (PROBIOTIC BLEND ORAL) Take by mouth.    pantoprazole (ProtoNix) 20 mg EC tablet Take 1 tablet (20 mg) by mouth once daily.    pantoprazole (ProtoNix) 40  mg EC tablet Take 1 tablet (40 mg) by mouth once daily.    rivaroxaban (Xarelto) 20 mg tablet Take 1 tablet (20 mg) by mouth once daily.    ketoconazole (NIZOral) 2 % cream Apply 1 Application topically 2 times a day.    [DISCONTINUED] famotidine-Ca carb-mag hydrox (Pepcid Complete) -165 mg chewable tablet Chew 1 tablet once daily as needed for heartburn.     No current facility-administered medications on file prior to visit.        Surgical History  He has a past surgical history that includes Knee arthroscopy w/ debridement (02/02/2015); MR angio head wo IV contrast (12/15/2016); Lipoma resection (N/A, 09/2023); and [place] tooth extraction (1982).     Social History  He reports that he has never smoked. He has never been exposed to tobacco smoke. He has never used smokeless tobacco. He reports current alcohol use of about 2.0 standard drinks of alcohol per week. He reports that he does not use drugs.    Family History  Family History   Problem Relation Name Age of Onset    Macular degeneration Mother Megha     Osteoporosis Mother Megha     Transient ischemic attack Mother Megha     Brain Aneurysm Mother Megha     Hypertension Mother Megha     Other (Heart valve surgery) Father Ray     Other (RHEUMATIC HEART DISEASE) Father Ray     Nephrolithiasis Father Ray     Heart disease Father Ray     Hypertension Father Ray     Breast cancer Sister Peg     Irritable bowel syndrome Sister Peg     ROGER disease Sister Peg     Hypertension Sister Peg     Cancer Sister Peg     Prostate cancer Brother      Cancer Other GRAND FATHER     Cancer Brother Yves Salinas         Allergies  Chocolate flavor, Cocoa, and Penicillins    Immunizations  Immunization History   Administered Date(s) Administered    Flu vaccine (IIV4), preservative free *Check age/dose* 09/25/2018, 10/19/2023    Flu vaccine, quadrivalent, no egg protein, age 6 month or greater (FLUCELVAX) 10/11/2021, 10/13/2022    Hepatitis A vaccine, age 19 years and greater  (HAVRIX) 06/08/2022    Hepatitis A vaccine, pediatric/adolescent (HAVRIX, VAQTA) 07/03/2009, 08/03/2009    Hepatitis B vaccine, pediatric/adolescent (RECOMBIVAX, ENGERIX) 07/03/2009, 08/03/2009    Influenza, injectable, MDCK, quadrivalent 11/01/2017, 10/10/2018, 12/09/2019    Influenza, seasonal, injectable 07/03/2009, 09/21/2016    MMR vaccine, subcutaneous (MMR II) 07/03/2009    Pfizer COVID-19 vaccine, bivalent, age 12 years and older (30 mcg/0.3 mL) 12/17/2022    Pfizer Gray Cap SARS-CoV-2 06/15/2022    Pfizer Purple Cap SARS-CoV-2 03/24/2021, 04/14/2021, 12/13/2021    Poliovirus vaccine, subcutaneous (IPOL) 07/03/2009    Tdap vaccine, age 7 year and older (BOOSTRIX, ADACEL) 07/03/2009, 01/30/2018, 06/08/2022    Typhoid, Parenteral 07/01/2009    Typhoid, ViCPs 06/08/2022    Zoster vaccine, recombinant, adult (SHINGRIX) 10/15/2021, 02/24/2022        ROS  Negative, except as discussed in HPI     Vitals  /86   Pulse 75   Temp 36.5 °C (97.7 °F)   Wt 98 kg (216 lb)   SpO2 95%   BMI 30.99 kg/m²      Physical Exam  Vitals and nursing note reviewed.   Constitutional:       General: He is not in acute distress.     Appearance: Normal appearance.   Pulmonary:      Effort: No respiratory distress.   Chest:      Chest wall: No mass or tenderness.   Breasts:     Right: Normal. No mass, nipple discharge, skin change or tenderness.      Left: No mass (Patient reported mass in the outer quadrant about 4oclock from the areola), nipple discharge, skin change or tenderness.   Lymphadenopathy:      Upper Body:      Right upper body: No supraclavicular or pectoral adenopathy.   Neurological:      General: No focal deficit present.      Mental Status: He is alert. Mental status is at baseline.         Relevant Results  Lab Results   Component Value Date    WBC 6.1 09/18/2023    WBC 8.9 09/06/2023    HGB 15.1 09/18/2023    HGB 15.4 09/06/2023    HCT 46.2 09/18/2023    HCT 47.4 09/06/2023    MCV 88.5 09/18/2023    MCV 88.9  09/06/2023     09/18/2023     09/06/2023     Lab Results   Component Value Date     09/18/2023     09/06/2023    K 4.2 09/18/2023    K 4.5 09/06/2023     (H) 09/18/2023     (H) 09/06/2023    CO2 24 09/18/2023    CO2 23 (L) 09/06/2023    BUN 12 09/18/2023    BUN 13 09/06/2023    CREATININE 1.0 09/18/2023    CREATININE 1.0 09/06/2023    CALCIUM 9.1 09/18/2023    CALCIUM 9.6 09/06/2023    PROT 6.1 09/18/2023    PROT 7.1 12/09/2022    BILITOT 0.6 09/18/2023    BILITOT 0.7 12/09/2022    ALKPHOS 78 09/18/2023    ALKPHOS SAMPLE HEMOLYZED TO BE RECOLLECTED 12/09/2022    ALT 17 09/18/2023    ALT 23 12/09/2022    AST 20 09/18/2023    AST 17 12/09/2022    GLUCOSE 99 09/18/2023    GLUCOSE 98 09/06/2023     Lab Results   Component Value Date    HGBA1C 5.4 09/24/2020     Lab Results   Component Value Date    TSH 0.53 09/24/2020      Lab Results   Component Value Date    CHOL 175 09/18/2023    TRIG 67 09/18/2023    HDL 57 09/18/2023           Assessment/Plan   Mikey was seen today for follow-up.  Diagnoses and all orders for this visit:  Mass of lower outer quadrant of left breast (Primary)  -     BI US breast complete bilateral; Future  Anxiety about health  -     DULoxetine (Cymbalta) 30 mg DR capsule; Take 1 capsule (30 mg) by mouth once daily. Do not crush or chew.  -     hydrOXYzine HCL (Atarax) 25 mg tablet; Take 1 tablet (25 mg) by mouth every 8 hours if needed (anxiety or insomnia).       Medications Discontinued During This Encounter   Medication Reason    famotidine-Ca carb-mag hydrox (Pepcid Complete) -165 mg chewable tablet Therapy completed        Counseling:   Medication education:   -Education:  The patient is counseled regarding potential side-effects of any and all new medications  -Understanding:  Patient expressed understanding of information discussed today  -Adherence:  No barriers to adherence identified    Final treatment plan is a result of shared decision making  with patient.         Felipe Salcedo MD

## 2024-04-05 ENCOUNTER — ALLIED HEALTH (OUTPATIENT)
Dept: INTEGRATIVE MEDICINE | Facility: CLINIC | Age: 59
End: 2024-04-05

## 2024-04-05 DIAGNOSIS — F41.9 ANXIETY: ICD-10-CM

## 2024-04-05 DIAGNOSIS — K63.8219 SMALL INTESTINAL BACTERIAL OVERGROWTH (SIBO): Primary | ICD-10-CM

## 2024-04-05 PROCEDURE — 97811 ACUP 1/> W/O ESTIM EA ADD 15: CPT | Performed by: NATUROPATH

## 2024-04-05 PROCEDURE — 97810 ACUP 1/> WO ESTIM 1ST 15 MIN: CPT | Performed by: NATUROPATH

## 2024-04-05 NOTE — PROGRESS NOTES
Acupuncture Visit:     Subjective   Patient ID: Mikey Salinas is a 58 y.o. male who presents for No chief complaint on file.  Still feels improved  Has tight sensation in abdomen  Met with PCP talked about anxiety, recommended anxiety meds, has not started  Can wake with palps, hard to fall back asleep  BM- regular, solid formed,   Gas is still reduced  Still monitoring scoliosis, seeing chiro every 2 weeks, physical therapy every 3 weeks.             Session Information  Is this acupuncture treatment being billed to the patient's insurance company: No  Visit Type: Follow-up visit  Medical History Reviewed: I have reviewed pertinent medical history in EHR, and no contraindications are present to provide treatment               Review of Systems         Provider reviewed plan for the acupuncture session, precautions and contraindications. Patient/guardian/hospital staff has given consent to treat with full understanding of what to expect during the session. Before acupuncture began, provider explained to the patient to communicate at any time if the procedure was causing discomfort past their tolerance level. Patient agreed to advise acupuncturist. The acupuncturist counseled the patient on the risks of acupuncture treatment including pain, infection, bleeding, and no relief of pain. The patient was positioned comfortably. There was no evidence of infection at the site of needle insertions.    Objective   Physical Exam       Acupuncture Treatment  Body Points - Bilateral: Du 20,, LI11, SJ7, LI4, ST25, Srinath 12SP9, St36, SP6, KI7, LR3, SP3  Auricular Points: Yes  Auricular Points - Bilateral: ear ramirez men, pt 0  Other Techniques Utilized: TDP Lamp  TDP Lamp Descripton: abdomen  Needle Count In: 26  Needle Count Out: 26  Needle Retention Time (min): 25 minutes  Total Face to Face Time (min): 25 minutes                      Assessment/Plan   Diagnoses and all orders for this visit:  Small intestinal bacterial overgrowth  (SIBO) (Primary)  Anxiety

## 2024-04-09 ENCOUNTER — HOSPITAL ENCOUNTER (OUTPATIENT)
Dept: RADIOLOGY | Facility: HOSPITAL | Age: 59
Discharge: HOME | End: 2024-04-09
Payer: COMMERCIAL

## 2024-04-09 DIAGNOSIS — R91.8 MULTIPLE LUNG NODULES ON CT: ICD-10-CM

## 2024-04-09 PROCEDURE — 71250 CT THORAX DX C-: CPT

## 2024-04-09 PROCEDURE — 71250 CT THORAX DX C-: CPT | Performed by: RADIOLOGY

## 2024-04-15 ENCOUNTER — TREATMENT (OUTPATIENT)
Dept: PHYSICAL THERAPY | Facility: CLINIC | Age: 59
End: 2024-04-15
Payer: COMMERCIAL

## 2024-04-15 DIAGNOSIS — G89.29 CHRONIC BILATERAL LOW BACK PAIN, UNSPECIFIED WHETHER SCIATICA PRESENT: ICD-10-CM

## 2024-04-15 DIAGNOSIS — M54.50 CHRONIC BILATERAL LOW BACK PAIN, UNSPECIFIED WHETHER SCIATICA PRESENT: ICD-10-CM

## 2024-04-15 PROCEDURE — 97110 THERAPEUTIC EXERCISES: CPT | Mod: GP,CQ

## 2024-04-15 NOTE — PROGRESS NOTES
Physical Therapy    Physical Therapy Treatment    Patient Name: Mikey Salinas  MRN: [id number]  Today's Date: 4/15/2024    Time Calculation  Start Time: 0930  Stop Time: 1015  Time Calculation (min): 45 min    Visit #: 12 out of 15 POC, 3/2024, ins 20 year   Evaluation date: 12/19/23    Current Problem:   1. Chronic bilateral low back pain, unspecified whether sciatica present  Follow Up In Physical Therapy          SUBJECTIVE:   Pt returns after a long break, reports to be doing fine with LBP but is feeling discomfort higher up in the back, mid scap region potentially. Pt mentions a slight 'twinge' upon sitting down on Nustep to begin, corresponding with the general potential for the sx over time.    Precautions: Scoliosis 16 deg per pt  Cervical   GERD-abdominal pain  Anxiety-self reported-reports poor sleep, digestive issues   clotting disorder factor 2 prothrombin-on blood thinners -gentle with manual  NO DN  2014-blood clot left knee to lungs-broken up-slight lung scarring-no SOB  Bursitis of shoulders   Surgical History: lipoma removal end September-torso  XR:12/18/23:  FINDINGS:   There is significant dextroconvex scoliosis of the thoracic spine centered around T7-T8 and T8-T9 levels. There are moderate multilevel discogenic degenerative changes of thoracic spine, more pronounced at T7-T8 and T8-T9 levels. There is mild superior endplate compression deformities of L2 and L3 vertebral bodies, similar compared to prior CT examination dated 06/30/2023. Otherwise rest of the lumbar vertebral body heights are maintained. There is grade 1 anterolisthesis of L5 over S1 vertebral body with suggestion of  bilateral pars defects. The rest of the visualized osseous skeleton appears grossly unremarkable. No acute fracture deformity is noted. Bilateral lungs are clear. Bowel gas pattern is nonobstructive. No obvious soft tissue abnormality is noted.      IMPRESSION:  1. Dextroconvex scoliosis of the thoracic spine. Precise  "measurements will be performed by orthopedic service.  2. Mild superior endplate compression fracture deformities of L2 and L3 vertebral bodies, similar compared to prior CT examination dated 06/30/2023.  3. Grade 1 anterolisthesis of L5 over S1 vertebral body with suggestion of bilateral pars defects.     Pain:   Start of Session: 0/10     OBJECTIVE:    Today:Tight hams noted with 90/90 nerve glide, good core control with paloff press  R scapular winging secondary to scoliosis     2/21/24  LUMBAR ROM AROM    LEFT RIGHT   Sidebend WFL, pain free WFL, pain free   Rotation WFL, pain free WFL, pain free   Flexion WFL, pain free   Extension WFL,, neutral no pain       Treatments:  Therapeutic Exercise: (45 minutes)  Nustep L5 x 10min  Cable row:  - 60# x15  - 84# 2x15  Standing elbow plank on wall combo hip press 2x15 ea.  Standing shoulder press 3# combo opposite SA 90 hold x15+10  4\" ant step down L/R 2x15  Standing combo SA paloff and horz abd vs bungee  Supine SA serratus press L/R: - 10# x10 -! (Due to increase sx).  Supine DA horz abd x10  H/L lumbar rotation w/ opposite UE reach -!(  H/L lumbar rotation xfatigue      HEP:  Access Code: [email or web address]  URL: [email or web address]  Date: 01/11/2024  Prepared by: Danii    Exercises  - Sidestepping  - 1 x daily - 4 x weekly - 2 sets - 10 reps    Access Code: [email or web address]  URL: [email or web address]  Date: 12/28/2023  Prepared by: Danii  Exercises  - Supine Bridge  - 1 x daily - 4 x weekly - 2 sets - 10 reps  - Clamshell  - 1 x daily - 4 x weekly - 2 sets - 10 reps    pt educated to perform gamaliel as tolerated stop if greater pain or N/T,hand outs issued  Access Code: [email or web address]  URL: [email or web address]  Date: 12/19/2023  Prepared by: Mariajose Quintero     Exercises  - Supine Transversus Abdominis Bracing - Hands on Stomach  - 2 x daily - 5 x weekly - 1-2 sets - 10 reps - 3 hold  - Supine Single Knee to Chest  - 1-2 x daily - 7 x weekly - 3 reps - 20 hold  - Supine " Hamstring Stretch  - 2 x daily - 7 x weekly - 15 reps - 10 hold either or,  - Supine Hamstring Stretch with Strap  - 2 x daily - 7 x weekly - 3 reps - 20 hold      ASSESSMENT:   Pt presents L curve scoliosis at T8-T9 level and R scapular winging. Continued to work w/ patient on building stability at neutral spine position as it ideally relates to and avoids exacerbation of preexisting scoliotic nature.      Post session pain : less stiff     PLAN: continue 1x week to every other for up to 5 sessions (15 total)  Add band to clam shell  Neutral spine strengthening    OP PT PLAN:  Treatment/Interventions: Aquatic Therapy , Education/Instruction , Electrical Stimulation , Manual Therapy  , Neuromuscular re-education , Therapeutic activities , and Therapeutic exercise    PT Plan: Skilled PT   PT Frequency: 1-2 times per week  Duration: 5 weeks and reassess up to 15  Visits Approved: 20 visits per year  Rehab Potential: Fair  Plan of Care Agreement: Patient         Goals:   STG: 10  Pt will report 25% reduction in pain 2-5/10 with activity>>>A  Pt will improve hamstring flexibility to min loss  -PROGRESSING but still tight  Pt will improve lumbar arom 25%  -MET  Pt  will demo correct body mechanics with supine to sit transfers.>>>MET     LTG 15  Pt will improve core/hip strength to 5/5.  -PROGRESSING  Pt will improve 10% with LESLEE scores indicating improved function,currently 14% LESLEE  -PROGRESSING

## 2024-04-16 ENCOUNTER — APPOINTMENT (OUTPATIENT)
Dept: CARDIOLOGY | Facility: CLINIC | Age: 59
End: 2024-04-16
Payer: COMMERCIAL

## 2024-04-24 ENCOUNTER — TELEPHONE (OUTPATIENT)
Dept: PRIMARY CARE | Facility: CLINIC | Age: 59
End: 2024-04-24
Payer: COMMERCIAL

## 2024-04-24 DIAGNOSIS — N63.23 MASS OF LOWER OUTER QUADRANT OF LEFT BREAST: Primary | ICD-10-CM

## 2024-04-24 NOTE — TELEPHONE ENCOUNTER
[facility] CALLING FOR ORDER FOR BILATERAL DIAGNOSTIC MAMMOGRAM ONLY ORDER FOR US BREAST WAS SENT OVER -[phone]

## 2024-04-25 ENCOUNTER — HOSPITAL ENCOUNTER (OUTPATIENT)
Dept: RADIOLOGY | Facility: HOSPITAL | Age: 59
Discharge: HOME | End: 2024-04-25
Payer: COMMERCIAL

## 2024-04-25 ENCOUNTER — APPOINTMENT (OUTPATIENT)
Dept: PHYSICAL THERAPY | Facility: CLINIC | Age: 59
End: 2024-04-25
Payer: COMMERCIAL

## 2024-04-25 DIAGNOSIS — N63.23 MASS OF LOWER OUTER QUADRANT OF LEFT BREAST: ICD-10-CM

## 2024-04-25 PROCEDURE — 76642 ULTRASOUND BREAST LIMITED: CPT | Mod: LT

## 2024-04-25 PROCEDURE — 76642 ULTRASOUND BREAST LIMITED: CPT | Performed by: RADIOLOGY

## 2024-04-25 PROCEDURE — 76982 USE 1ST TARGET LESION: CPT | Mod: LT

## 2024-04-25 PROCEDURE — 77062 BREAST TOMOSYNTHESIS BI: CPT

## 2024-04-25 PROCEDURE — 77066 DX MAMMO INCL CAD BI: CPT | Performed by: RADIOLOGY

## 2024-04-25 PROCEDURE — 77062 BREAST TOMOSYNTHESIS BI: CPT | Performed by: RADIOLOGY

## 2024-04-30 ENCOUNTER — ALLIED HEALTH (OUTPATIENT)
Dept: INTEGRATIVE MEDICINE | Facility: CLINIC | Age: 59
End: 2024-04-30

## 2024-04-30 DIAGNOSIS — K63.8219 SMALL INTESTINAL BACTERIAL OVERGROWTH (SIBO): Primary | ICD-10-CM

## 2024-04-30 DIAGNOSIS — F41.9 ANXIETY: ICD-10-CM

## 2024-04-30 PROCEDURE — 97811 ACUP 1/> W/O ESTIM EA ADD 15: CPT | Performed by: NATUROPATH

## 2024-04-30 PROCEDURE — 97810 ACUP 1/> WO ESTIM 1ST 15 MIN: CPT | Performed by: NATUROPATH

## 2024-04-30 NOTE — PROGRESS NOTES
Acupuncture Visit:     Subjective   Patient ID: Mikey Salinas is a 58 y.o. male who presents for No chief complaint on file.  Digestion still improved,   Some tightness upper abdomen, but kows it could be scliosis related  Sleep still poor, woke 330am today  No palps recently  Still not started medications.   Working with spine specialist for scoliosis.             Session Information  Is this acupuncture treatment being billed to the patient's insurance company: No  Visit Type: Follow-up visit  Medical History Reviewed: I have reviewed pertinent medical history in EHR, and no contraindications are present to provide treatment               Review of Systems         Provider reviewed plan for the acupuncture session, precautions and contraindications. Patient/guardian/hospital staff has given consent to treat with full understanding of what to expect during the session. Before acupuncture began, provider explained to the patient to communicate at any time if the procedure was causing discomfort past their tolerance level. Patient agreed to advise acupuncturist. The acupuncturist counseled the patient on the risks of acupuncture treatment including pain, infection, bleeding, and no relief of pain. The patient was positioned comfortably. There was no evidence of infection at the site of needle insertions.    Objective   Physical Exam       Acupuncture Treatment  Body Points - Bilateral: Du 20,, LI11, SJ7, LI4, ST25, Srinath 12SP9, St36, SP6, KI7, LR3, SP3  Auricular Points: Yes  Auricular Points - Bilateral: ear ramirez men, pt 0  Other Techniques Utilized: TDP Lamp  TDP Lamp Descripton: abdomen  Needle Count In: 26  Needle Count Out: 26  Needle Retention Time (min): 25 minutes  Total Face to Face Time (min): 25 minutes                      Assessment/Plan   Diagnoses and all orders for this visit:  Small intestinal bacterial overgrowth (SIBO) (Primary)  Anxiety

## 2024-05-06 ENCOUNTER — OFFICE VISIT (OUTPATIENT)
Dept: PRIMARY CARE | Facility: CLINIC | Age: 59
End: 2024-05-06
Payer: COMMERCIAL

## 2024-05-06 VITALS
OXYGEN SATURATION: 95 % | HEART RATE: 65 BPM | WEIGHT: 220.6 LBS | BODY MASS INDEX: 31.58 KG/M2 | SYSTOLIC BLOOD PRESSURE: 130 MMHG | DIASTOLIC BLOOD PRESSURE: 80 MMHG | TEMPERATURE: 97 F | HEIGHT: 70 IN

## 2024-05-06 DIAGNOSIS — M79.89 SWELLING OF LEFT INDEX FINGER: Primary | ICD-10-CM

## 2024-05-06 PROCEDURE — 3008F BODY MASS INDEX DOCD: CPT | Performed by: FAMILY MEDICINE

## 2024-05-06 PROCEDURE — 99213 OFFICE O/P EST LOW 20 MIN: CPT | Performed by: FAMILY MEDICINE

## 2024-05-06 PROCEDURE — 1036F TOBACCO NON-USER: CPT | Performed by: FAMILY MEDICINE

## 2024-05-06 ASSESSMENT — PAIN SCALES - GENERAL: PAINLEVEL: 0-NO PAIN

## 2024-05-06 ASSESSMENT — LIFESTYLE VARIABLES
HOW MANY STANDARD DRINKS CONTAINING ALCOHOL DO YOU HAVE ON A TYPICAL DAY: 1 OR 2
HOW OFTEN DO YOU HAVE SIX OR MORE DRINKS ON ONE OCCASION: NEVER
HOW OFTEN DO YOU HAVE A DRINK CONTAINING ALCOHOL: 2-4 TIMES A MONTH
AUDIT-C TOTAL SCORE: 2
SKIP TO QUESTIONS 9-10: 1

## 2024-05-06 NOTE — PROGRESS NOTES
History Of Present Illness  Mikey Salinas is a 58 y.o. male presenting for Follow-up (Concerns with possible infected finger x1week)  .    HPI     Patient has a skin lesion on his left index finger. Noticed it about 2 weeks ago.  The red bump with dry skin on it.  It is only painful to push on it hard.  It was never bright red or warm.  He has been working around the house, but is mainly right-hand-dominant.  He is concern for foreign body but does not recall any injury to the hand.    He has anxiety but has not started the Cymbalta or tried the hydroxyzine yet.     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    diclofenac sodium (Voltaren) 1 % gel gel Apply topically.    DULoxetine (Cymbalta) 30 mg DR capsule Take 1 capsule (30 mg) by mouth once daily. Do not crush or chew.    fluticasone (Flonase Allergy Relief) 50 mcg/actuation nasal spray Administer 1 spray into each nostril once daily.    ketoconazole (NIZOral) 2 % cream Apply 1 Application topically 2 times a day.    L.acid/L.casei/B.bif/B.kenroy/FOS (PROBIOTIC BLEND ORAL) Take by mouth.    pantoprazole (ProtoNix) 20 mg EC tablet Take 1 tablet (20 mg) by mouth once daily.    rivaroxaban (Xarelto) 20 mg tablet Take 1 tablet (20 mg) by mouth once daily.    hydrOXYzine  HCL (Atarax) 25 mg tablet Take 1 tablet (25 mg) by mouth every 8 hours if needed (anxiety or insomnia).    [DISCONTINUED] pantoprazole (ProtoNix) 40 mg EC tablet Take 1 tablet (40 mg) by mouth once daily.     No current facility-administered medications on file prior to visit.        Surgical History  He has a past surgical history that includes Knee arthroscopy w/ debridement (02/02/2015); MR angio head wo IV contrast (12/15/2016); Lipoma resection (N/A, 09/2023); and [place] tooth extraction (1982).     Social History  He reports that he has never smoked. He has never been exposed to tobacco smoke. He has never used smokeless tobacco. He reports current alcohol use of about 2.0 standard drinks of alcohol per week. He reports that he does not use drugs.    Family History  Family History   Problem Relation Name Age of Onset    Macular degeneration Mother Megha     Osteoporosis Mother Megha     Transient ischemic attack Mother Megha     Brain Aneurysm Mother Megha     Hypertension Mother Megha     Other (Heart valve surgery) Father Ray     Other (RHEUMATIC HEART DISEASE) Father Ray     Nephrolithiasis Father Ray     Heart disease Father Ray     Hypertension Father Ray     Breast cancer Sister Peg 58    Irritable bowel syndrome Sister Peg     ROGER disease Sister Peg     Hypertension Sister Peg     Cancer Sister Peg     Prostate cancer Brother      Cancer Brother Yves Salinas     Cancer Other GRAND FATHER         Allergies  Chocolate flavor, Cocoa, and Penicillins    Immunizations  Immunization History   Administered Date(s) Administered    Flu vaccine (IIV4), preservative free *Check age/dose* 09/25/2018, 10/19/2023    Flu vaccine, quadrivalent, no egg protein, age 6 month or greater (FLUCELVAX) 10/11/2021, 10/13/2022    Hepatitis A vaccine, age 19 years and greater (HAVRIX) 06/08/2022    Hepatitis A vaccine, pediatric/adolescent (HAVRIX, VAQTA) 07/03/2009, 08/03/2009    Hepatitis B vaccine, pediatric/adolescent  "(RECOMBIVAX, ENGERIX) 07/03/2009, 08/03/2009    Influenza, injectable, MDCK, quadrivalent 11/01/2017, 10/10/2018, 12/09/2019    Influenza, seasonal, injectable 07/03/2009, 09/21/2016    MMR vaccine, subcutaneous (MMR II) 07/03/2009    Pfizer COVID-19 vaccine, bivalent, age 12 years and older (30 mcg/0.3 mL) 12/17/2022    Pfizer Gray Cap SARS-CoV-2 06/15/2022    Pfizer Purple Cap SARS-CoV-2 03/24/2021, 04/14/2021, 12/13/2021    Poliovirus vaccine, subcutaneous (IPOL) 07/03/2009    Tdap vaccine, age 7 year and older (BOOSTRIX, ADACEL) 07/03/2009, 01/30/2018, 06/08/2022    Typhoid, Parenteral 07/01/2009    Typhoid, ViCPs 06/08/2022    Zoster vaccine, recombinant, adult (SHINGRIX) 10/15/2021, 02/24/2022        ROS  Negative, except as discussed in HPI     Vitals  /80   Pulse 65   Temp 36.1 °C (97 °F)   Ht 1.778 m (5' 10\")   Wt 100 kg (220 lb 9.6 oz)   SpO2 95%   BMI 31.65 kg/m²      Physical Exam  Vitals and nursing note reviewed.   Constitutional:       General: He is not in acute distress.  Skin:     General: Skin is warm and dry.      Comments: Left hand, palmar aspect of the left index finger at the PIP joint there is a small red nodule with hyperkeratotic skin on top   Neurological:      General: No focal deficit present.      Mental Status: He is alert.   Psychiatric:         Mood and Affect: Mood normal.         Behavior: Behavior normal.         Thought Content: Thought content normal.         Judgment: Judgment normal.         Relevant Results  Lab Results   Component Value Date    WBC 6.1 09/18/2023    WBC 8.9 09/06/2023    HGB 15.1 09/18/2023    HGB 15.4 09/06/2023    HCT 46.2 09/18/2023    HCT 47.4 09/06/2023    MCV 88.5 09/18/2023    MCV 88.9 09/06/2023     09/18/2023     09/06/2023     Lab Results   Component Value Date     09/18/2023     09/06/2023    K 4.2 09/18/2023    K 4.5 09/06/2023     (H) 09/18/2023     (H) 09/06/2023    CO2 24 09/18/2023    CO2 23 " (L) 09/06/2023    BUN 12 09/18/2023    BUN 13 09/06/2023    CREATININE 1.0 09/18/2023    CREATININE 1.0 09/06/2023    CALCIUM 9.1 09/18/2023    CALCIUM 9.6 09/06/2023    PROT 6.1 09/18/2023    PROT 7.1 12/09/2022    BILITOT 0.6 09/18/2023    BILITOT 0.7 12/09/2022    ALKPHOS 78 09/18/2023    ALKPHOS SAMPLE HEMOLYZED TO BE RECOLLECTED 12/09/2022    ALT 17 09/18/2023    ALT 23 12/09/2022    AST 20 09/18/2023    AST 17 12/09/2022    GLUCOSE 99 09/18/2023    GLUCOSE 98 09/06/2023     Lab Results   Component Value Date    HGBA1C 5.4 09/24/2020     Lab Results   Component Value Date    TSH 0.53 09/24/2020      Lab Results   Component Value Date    CHOL 175 09/18/2023    TRIG 67 09/18/2023    HDL 57 09/18/2023           Assessment/Plan   Mikey was seen today for follow-up.  Diagnoses and all orders for this visit:  Swelling of left index finger (Primary)  Comments:  It looks like a callus/corn, use supportive OTC pad and monitor.  Advise conservative treatment.  If no improvement or worsens then do hand x-ray to check for foreign body  Orders:  -     XR fingers left 2+ views; Future         Counseling:   Medication education:   -Education:  The patient is counseled regarding potential side-effects of any and all new medications  -Understanding:  Patient expressed understanding of information discussed today  -Adherence:  No barriers to adherence identified    Final treatment plan is a result of shared decision making with patient.         Felipe Salcedo MD

## 2024-05-08 ENCOUNTER — HOSPITAL ENCOUNTER (OUTPATIENT)
Dept: RADIOLOGY | Facility: EXTERNAL LOCATION | Age: 59
Discharge: HOME | End: 2024-05-08
Payer: COMMERCIAL

## 2024-05-08 DIAGNOSIS — M25.511 RIGHT SHOULDER PAIN, UNSPECIFIED CHRONICITY: ICD-10-CM

## 2024-05-12 ASSESSMENT — ENCOUNTER SYMPTOMS
MYALGIAS: 1
ARTHRALGIAS: 1
RESPIRATORY NEGATIVE: 1
CARDIOVASCULAR NEGATIVE: 1
CONSTITUTIONAL NEGATIVE: 1

## 2024-05-12 NOTE — PATIENT INSTRUCTIONS
May use RICE therapy as needed  Start into hand/physical therapy 1-2 times a week for 8-10 weeks with manual therapy as well as dry needling and IASTM  Additionally reviewed modifying activities to be performed while exercising as well as activities with daily living  Discussed in detail with the patient to the level of their understanding the possibility in the future of regenerative injections versus corticosteroid injections  Recommendation over-the-counter calcium 500mg, 3 times a day with vitamin-D3 8649-3480+ units a day with food as well as a daily multivitamin  Recommendation over-the-counter Move Free for joint health  May take OTC Tylenol Extra Strength or OTC Tylenol Arthritis, taking one every 6-8 hours with food as needed for pain management.   Patient advised regarding the risks and/or potential adverse reactions and/or side effects of any prescribed medications along with any over-the-counter medications or any supplements used. Patient advised to seek immediate medical care if any adverse reactions occur. The patient and/or patient(s) parent(s) verbalized their understanding.  Possibility in future of MR Arthrogram of RIGHT SHOULDER to rule out tendon vs ligament vs labral tear vs fracture vs other. MSK to read  Work Restrictions- Able to work as RIGHT shoulder pain allows  Follow up in 4-6 weeks or sooner if needed

## 2024-05-12 NOTE — PROGRESS NOTES
New patient  History Of Present Illness  Mikey Salinas is a 58 y.o. male presenting with RIGHT shoulder pain. Patient reports intermittent RIGHT shoulder pain that began last year, but started to get progressively worse two weeks ago with a quick overhead motion. Patient denied a pop, snap or crack at that time noting pan in the anterior aspect of the RIGHT shoulder near the proximal biceps tendon. Patient denies numbness tingling or pins and needles in his RIGHT arm wrist or hand. Patient stated he was throwing a dog toy underhand last Wednesday and felt another pain in the belly of the muscle, again denying a pop, snap, or crack and numbness tingling, and pins and needles. Patient notes he had difficulty lifting a glass to drink after the initial injury due to pain in the biceps, noting that has improved, but he has not attempted to lift anything significantly heavy. Patient states he went to an urgent care location 5/8/24 and had X-rays. Patient states he has been using topical Voltaren and applying a hot pack to help with the pain, which has been moderately effective. Patient rates his pain as a sharp 3/10 that can increase to.. when attempting lift anything heavy.  We discussed treatment options.  Upon exam patient has indications of a bursitis with rotator cuff strain and degenerative joint disease of the affected shoulder.  Patient has good range of motion and strength with some mild restriction and weakness noted.  Patient also has some indications of a labrum injury albeit most likely degenerative in nature.  As such after discussion we will have patient start into physical therapy and he can follow-up as soon as he returns from his trip to [place] and we can discuss further treatment options at that time such as more advanced imaging of the affected shoulder such as MRI or CT.  Patient and spouse verbalized understanding and agreement with plan of care.      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    Surgical History  He has a past surgical history that includes Knee arthroscopy w/ debridement (02/02/2015); MR angio head wo IV contrast (12/15/2016); Lipoma resection (N/A, 09/2023); and [place] tooth extraction (1982).     Social History  He reports that he has never smoked. He has never been exposed to tobacco smoke. He has never used smokeless tobacco. He reports current alcohol use of about 2.0 standard drinks of alcohol per week. He reports that he does not use drugs.    Family History  Family History   Problem Relation Name Age of Onset    Macular degeneration Mother Megha     Osteoporosis Mother Megha     Transient ischemic attack Mother Megha     Brain Aneurysm Mother Megha     Hypertension Mother Megha     Other (Heart valve surgery) Father Ray     Other (RHEUMATIC HEART DISEASE) Father Ray     Nephrolithiasis Father Ray     Heart disease Father Ray     Hypertension Father Ray     Breast cancer Sister Peg 58    Irritable bowel syndrome Sister Peg     ROGER disease Sister Peg     Hypertension Sister Peg     Cancer Sister Peg     Prostate cancer Brother      Cancer Brother Yves Salinas     Cancer Other GRAND FATHER         Allergies  Chocolate flavor, Cocoa, and Penicillins    Review of Systems  Review of Systems   Constitutional: Negative.    Respiratory: Negative.     Cardiovascular: Negative.    Musculoskeletal:  Positive for  "arthralgias and myalgias.   All other systems reviewed and are negative.       Last Recorded Vitals  /82 (BP Location: Left arm, Patient Position: Sitting, BP Cuff Size: Adult)   Pulse 70   Ht 1.778 m (5' 10\")   Wt 97.1 kg (214 lb)   BMI 30.71 kg/m²      Examination:  Right Shoulder    Edema: Negative.   Ecchymosis/Bruising: Negative.   Percussion Test: Negative.   Tuning Fork Test: Negative.   Orientation: Asymmetrical: RIGHT shoulder             Winging Scapula:   Positive  RIGHT    ROM:   FROM, Positive Causes pain  Forward Flexion (0-180 degrees) Positive Causes pain  Extension (0-60 degrees)  ABduction (0-180 degrees) Positive Causes pain  ADduction (30-50 degrees)  External Rotation with elbow at side (0-90 degrees)  Internal Rotation with elbow at side (0-70 degrees)  Horizontal ABduction (0-90 degrees) Positive Causes pain  Horizontal ADduction (0-45 degrees) Positive Causes pain  External Rotation with elbow at 90 degrees of ABduction [0-() degrees] Positive Causes pain  Internal Rotation with elbow at 90 degrees of ABduction [0-(70-90) degrees] Positive Causes pain  Positive  Apley's Shoulder Scratch Test Superiorly Tests a Combination of: Flexion, External Rotation, and Scapular ABduction - Decreased due to pain   Positive  Apley's Shoulder Scratch Test Inferiorly Tests a Combination of: Extension, Internal Rotation, and Scapular ADduction - Decreased due to pain    Muscle Strength: Positive   +4/+5:Internal Rotation - subscapularis  +4/+5: External Rotation - infraspinatus and teres minor  +4/+5:ABduction - supraspinatus and deltoid  +4/+5: ABduction with thumbs down and 30 degrees horizontal ADduction - supraspinatus  +4/+5: Palms up with elbow bent to 15 degrees flexion and resisted upward motion - biceps  +4/+5: Simultaneous resisted supination and elbow flexion- biceps  +5/+5:Flexion  +5/+5:Extension  +4/+5:ABduction  +4/+5:ADduction  +4/+5:Internal Rotation at 90 " "Degrees  +4/+5:External Rotation at 90 Degrees  +5/+5:Internal Rotation at 0 Degrees  +5/+5:External Rotation at 0 Degrees  +4/+5:Apley's Shoulder Scratch Test Superiorly Tests a Combination of: Flexion, External Rotation, and Scapular ABduction  +4/+5:Apley's Shoulder Scratch Test Inferiorly Tests a Combination of: Extension, Internal Rotation, and Scapular ADduction  +5/+5:Triceps  +5/+5: Biceps            Vascular:   Capillary Refillless than 2 seconds , Negative, Symmetrical:  +2/+4: Carotid pulse  +2/+4: Radial pulse  +2/+4: Ulnar pulse  +2/+4: Brachial pulse     Palpation:   Positive  Tenderness to Palpation Rotator Cuff interval, glenohumeral joint space, and biceps tendon.                          Shoulder - Subscapularis:  Bear Hug Test:  Negative.    Lift Off Test:  Negative.   Walsh Test:  Negative.   Resisted Elbow Push Down Test:  Negative.    Resisted Lift Off \"\" Test:  Negative.      Shoulder - Supraspinatus:  Full Can Test:  Positive     Empty Can Test:  Positive     Resisted Elbow Push Up Test: Negative  Drop Arm Test:  Positive      Shoulder - Infraspinatus:  Resisted ER at 0 degrees ABduction:  Negative. with arm at side.               Shoulder - Teres Minor:  Resisted ER at 90 degrees ABduction:  Negative.         Shoulder - Biceps:  Speeds Test: Positive   Yergason Test: Positive     Shoulder - Labrum/Instability:  Anterior Release/Surprise Test: Negative  Bicep Flexion at 90 degrees ABduction Test: Negative    Posterior Apprehension Test: Negative  Posterior Release/Surprise Test: Negative   Clunk Test:  Negative  Grind Test:  Positive  Moore Test:  Negative Thumbs Down, Negative Thumbs Up.   Crank Test:  Positive  R shoulder    Imaging and Diagnostics Review:  No new radiographs were obtained today.    Assessment   1. Pain in joint of right shoulder    2. Osteoarthritis of right acromioclavicular joint    3. Osteoarthritis of right glenohumeral joint    4. Biceps strain, right, " initial encounter    5. Rotator cuff strain, right, initial encounter    6. Supraspinatus sprain, right, initial encounter        Plan   Treatment or Intervention:  May use RICE therapy as needed  Start into hand/physical therapy 1-2 times a week for 8-10 weeks with manual therapy as well as dry needling and IASTM  Additionally reviewed modifying activities to be performed while exercising as well as activities with daily living  Discussed in detail with the patient to the level of their understanding the possibility in the future of regenerative injections versus corticosteroid injections  Recommendation over-the-counter calcium 500mg, 3 times a day with vitamin-D3 6261-9092+ units a day with food as well as a daily multivitamin  Recommendation over-the-counter Move Free for joint health  May take OTC Tylenol Extra Strength or OTC Tylenol Arthritis, taking one every 6-8 hours with food as needed for pain management.   Patient advised regarding the risks and/or potential adverse reactions and/or side effects of any prescribed medications along with any over-the-counter medications or any supplements used. Patient advised to seek immediate medical care if any adverse reactions occur. The patient and/or patient(s) parent(s) verbalized their understanding.  Possibility in future of MR Arthrogram of RIGHT SHOULDER to rule out tendon vs ligament vs labral tear vs fracture vs other. MSK to read    Diagnostic studies:  Urgent Care Xray  Narrative: Interpreted By:  Urbano Weir,   STUDY:  [facility] URGENT CARE XRAY; 5/8/2024 4:28 pm      INDICATION:  Signs/Symptoms:Right shoulder pain      COMPARISON:  None.      ACCESSION NUMBER(S):  [id number]      ORDERING CLINICIAN:  LETI MITCHELL      TECHNIQUE:  Three-view radiographs of the right shoulder were obtained.      FINDINGS:  There is no fracture or subluxation.  Degenerative changes of the acromioclavicular and glenohumeral joint  are present, with joint space narrowing and  small osteophytes.  Degenerative changes, disc disease and marked S-type scoliosis also  involve the visualized spine. The alignment is anatomic.  The soft tissues are unremarkable.      Impression: Degenerative changes as above. Thoracic scoliosis. No acute fracture  or subluxation.      Signed by: Urbano Weir 5/8/2024 4:56 PM  Dictation workstation:   [id number]     Activity Instructions, Restrictions, and Accommodations:      Consultations/Referrals:  Physical therapy    Follow-up:  Follow up in 4-6 weeks or sooner if needed.  Total appointment time _30_ minutes. Greater than 50% spent counseling patient on results of physical exam, treatment options as well as results of ordered imaging and treatment for results, need for PT and expected outcomes, as well as discussing possible medications.    FLIP YUAN on 5/13/24 at 8:10 AM.     Niraj Schwartz CNP

## 2024-05-13 ENCOUNTER — OFFICE VISIT (OUTPATIENT)
Dept: SPORTS MEDICINE | Facility: CLINIC | Age: 59
End: 2024-05-13
Payer: COMMERCIAL

## 2024-05-13 VITALS
DIASTOLIC BLOOD PRESSURE: 82 MMHG | HEART RATE: 70 BPM | SYSTOLIC BLOOD PRESSURE: 124 MMHG | HEIGHT: 70 IN | WEIGHT: 214 LBS | BODY MASS INDEX: 30.64 KG/M2

## 2024-05-13 DIAGNOSIS — S46.011A ROTATOR CUFF STRAIN, RIGHT, INITIAL ENCOUNTER: ICD-10-CM

## 2024-05-13 DIAGNOSIS — M19.011 OSTEOARTHRITIS OF RIGHT GLENOHUMERAL JOINT: ICD-10-CM

## 2024-05-13 DIAGNOSIS — M25.511 PAIN IN JOINT OF RIGHT SHOULDER: ICD-10-CM

## 2024-05-13 DIAGNOSIS — S46.211A BICEPS STRAIN, RIGHT, INITIAL ENCOUNTER: ICD-10-CM

## 2024-05-13 DIAGNOSIS — S46.811A SUPRASPINATUS SPRAIN, RIGHT, INITIAL ENCOUNTER: ICD-10-CM

## 2024-05-13 DIAGNOSIS — S43.401A SPRAIN OF RIGHT SHOULDER, UNSPECIFIED SHOULDER SPRAIN TYPE, INITIAL ENCOUNTER: ICD-10-CM

## 2024-05-13 DIAGNOSIS — M19.011 OSTEOARTHRITIS OF RIGHT ACROMIOCLAVICULAR JOINT: ICD-10-CM

## 2024-05-13 PROCEDURE — 99214 OFFICE O/P EST MOD 30 MIN: CPT | Performed by: NURSE PRACTITIONER

## 2024-05-13 PROCEDURE — 3008F BODY MASS INDEX DOCD: CPT | Performed by: NURSE PRACTITIONER

## 2024-05-13 RX ORDER — PREDNISONE 10 MG/1
TABLET ORAL
Qty: 30 TABLET | Refills: 0 | Status: SHIPPED | OUTPATIENT
Start: 2024-05-13

## 2024-05-13 ASSESSMENT — ENCOUNTER SYMPTOMS
OCCASIONAL FEELINGS OF UNSTEADINESS: 0
LOSS OF SENSATION IN FEET: 0
DEPRESSION: 1

## 2024-05-13 ASSESSMENT — PATIENT HEALTH QUESTIONNAIRE - PHQ9
10. IF YOU CHECKED OFF ANY PROBLEMS, HOW DIFFICULT HAVE THESE PROBLEMS MADE IT FOR YOU TO DO YOUR WORK, TAKE CARE OF THINGS AT HOME, OR GET ALONG WITH OTHER PEOPLE: NOT DIFFICULT AT ALL
SUM OF ALL RESPONSES TO PHQ9 QUESTIONS 1 AND 2: 1
2. FEELING DOWN, DEPRESSED OR HOPELESS: SEVERAL DAYS
1. LITTLE INTEREST OR PLEASURE IN DOING THINGS: NOT AT ALL

## 2024-05-13 ASSESSMENT — PAIN - FUNCTIONAL ASSESSMENT: PAIN_FUNCTIONAL_ASSESSMENT: 0-10

## 2024-05-13 ASSESSMENT — PAIN SCALES - GENERAL
PAINLEVEL: 3
PAINLEVEL_OUTOF10: 3

## 2024-05-23 DIAGNOSIS — Z76.0 MEDICATION REFILL: ICD-10-CM

## 2024-05-23 NOTE — TELEPHONE ENCOUNTER
Rx request received  Pharmacy populated  Last appmt 5/06/24 for acute ov. Last Physical 10/19/23

## 2024-05-28 ENCOUNTER — DOCUMENTATION (OUTPATIENT)
Dept: PHYSICAL THERAPY | Facility: CLINIC | Age: 59
End: 2024-05-28
Payer: COMMERCIAL

## 2024-05-28 NOTE — PROGRESS NOTES
Physical Therapy    Discharge Summary    Name: Mikey Salinas  MRN: [id number]  : 1965  Date: 24    Discharge Summary: PT    Discharge Information: Date of discharge 24, Date of last visit 4/15/24, Date of evaluation 24, Number of attended visits 12/15, Referred by Matias WHEELER, and Referred for chronic LBP/scoliosis,listhesis    Therapy Summary: Pt is a 57 yo male seen in PT with above impairments, rx consisted of spine stabilization gamaliel, flexibility, body mechanics, functional training, HEP.      Discharge Status: Pt reported decreased stiffness with exercise and improved LBP.Pt can continue I with his hep     Rehab Discharge Reason: Other Pt was independent and wants to focus on his shoulder rehab in PT.

## 2024-05-30 ENCOUNTER — EVALUATION (OUTPATIENT)
Dept: PHYSICAL THERAPY | Facility: CLINIC | Age: 59
End: 2024-05-30
Payer: COMMERCIAL

## 2024-05-30 DIAGNOSIS — M25.511 PAIN IN JOINT OF RIGHT SHOULDER: ICD-10-CM

## 2024-05-30 DIAGNOSIS — S43.401A SPRAIN OF RIGHT SHOULDER, UNSPECIFIED SHOULDER SPRAIN TYPE, INITIAL ENCOUNTER: ICD-10-CM

## 2024-05-30 DIAGNOSIS — M19.011 OSTEOARTHRITIS OF RIGHT GLENOHUMERAL JOINT: ICD-10-CM

## 2024-05-30 DIAGNOSIS — S46.011A ROTATOR CUFF STRAIN, RIGHT, INITIAL ENCOUNTER: ICD-10-CM

## 2024-05-30 DIAGNOSIS — S46.811A SUPRASPINATUS SPRAIN, RIGHT, INITIAL ENCOUNTER: ICD-10-CM

## 2024-05-30 DIAGNOSIS — S46.211A BICEPS STRAIN, RIGHT, INITIAL ENCOUNTER: ICD-10-CM

## 2024-05-30 DIAGNOSIS — M19.011 OSTEOARTHRITIS OF RIGHT ACROMIOCLAVICULAR JOINT: ICD-10-CM

## 2024-05-30 DIAGNOSIS — M25.519 SHOULDER PAIN, UNSPECIFIED CHRONICITY, UNSPECIFIED LATERALITY: Primary | ICD-10-CM

## 2024-05-30 PROCEDURE — 97110 THERAPEUTIC EXERCISES: CPT | Mod: GP | Performed by: PHYSICAL THERAPIST

## 2024-05-30 PROCEDURE — 97161 PT EVAL LOW COMPLEX 20 MIN: CPT | Mod: GP | Performed by: PHYSICAL THERAPIST

## 2024-05-30 ASSESSMENT — ENCOUNTER SYMPTOMS
OCCASIONAL FEELINGS OF UNSTEADINESS: 0
DEPRESSION: 0
LOSS OF SENSATION IN FEET: 0

## 2024-05-30 NOTE — PROGRESS NOTES
Physical Therapy    Physical Therapy Evaluation and Treatment    Patient Name: Mikey Salinas  MRN: [id number]  Today's Date: 2024  Visit  insurance ,  goals     Time Calculation  Start Time: 1440  Stop Time: 1520  Time Calculation (min): 40 min    Current Problem:   1. Pain in joint of right shoulder  Referral to Physical Therapy      2. Osteoarthritis of right acromioclavicular joint  Referral to Physical Therapy      3. Osteoarthritis of right glenohumeral joint  Referral to Physical Therapy      4. Biceps strain, right, initial encounter  Referral to Physical Therapy      5. Rotator cuff strain, right, initial encounter  Referral to Physical Therapy      6. Supraspinatus sprain, right, initial encounter  Referral to Physical Therapy      7. 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   -blood clot to lungs, PE '23  Surgical History: lipoma removal torso Sept,Lt arthroscopy w/ debridement (2015);   Medications: reviewed in hx form  Allergies: chocolate flavor, Cocoa,  Penicillins    Precautions: blood thinners clotting disorder no DN    Scoliosis 16 deg,Grade 1 anterolisthesis L5/ S1,  bilateral pars defects,remote Mild  compression fracture deformities of L2 and L3   STEADI Fall Risk: 0 (score of 4+ indicates fall risk)       SUBJECTIVE:   Pt is a 59 yo male c/o RT intermittent anterior/bicep proximal shoulder pain  past year, worse past few weeks, recent exacerbation YOON-LT arm chasing bees with net quick movement overhead, RT episode throwing dog toy underhand   Pt was dx with B/L bursitis a year ago, left and right sx returned in April  At night hands can tingle but clears   Imagin/8/24 XR: RT   no fracture or subluxation.  Degenerative changes acromioclavicular, glenohumeral joint, joint space narrowing,   small osteophytes.    Impression: Degenerative changes as above. Thoracic scoliosis. No acute fracture  or subluxation.   No MRI results   Pain:   Current: 1-5/10 RT , LT 1-4/10 constant  Location: RT anterior shoulder, left joint   Description: RT pops, dull ache   Aggravating Factors: RT overhead reach ,wash hair   Relieving Factors: rest  Sleep Pattern: supine and side  Previous Interventions: a year ago Dr Severino pt has been performing        Red flags: neg    Hand Dominance: RT  Occupation: retired   Hobbies:  hunting, hikes, rides motor cycle , fly fish-holding now  Home Living: multistory home   Prior Level of Function: prior to a year ago no limitations     Patient/Family Goal: decrease pain,     Outcome Measures: 30/130-23%    OBJECTIVE:    Cognition: intact   Posture: forward head, RT shoulder lower   Gait: normal arm swing   Functional Mobility: dressing without issue, rt arm in first   Palpation: RT SA space/RC bicep non tender bilateral    Joint Mobility:     Cervical AROM:  Flex-full  Ext full  Lat flex left/right 25% loss no sx  Rot left/right 85 deg no pain     UE AROM:  Shoulder: RT/LT  Flex 155/160 painful arc 105  Abd 170 end range pain /170  ER 75 shoulder blade pain, 75 LT  IR mild loss bilateral stiff not painful     MMT strength  Shoulder: RT/LT  Flex 4, 5  Abd 4+,5  ER infra  4, 5  IR sub scapularis 5,5  Supra 5- RT/LT    Special test:  Drop arm neg L/R  Impingement + RT, neg left   Apprehension negative bilat  Lag sign part 1/2 negative   Empty can neg L/R  Crank neg RT      Treatments:  Therapeutic Exercise: (10 minutes)   Posture education sit tall   Scapular adduction x10  Shrug with backward roll x10  Codman's x10 EA  for/back circles  Light distraction of neck no change shoulder sx,and vertebral a test negative   Manual Therapy: ( minutes)    Gait Training: ( minutes)    Neuromuscular Re-education: ( minutes)    Therapeutic Activity: (5 minutes)  Posture education, impingement  anatomy  OP Education: rx plan findings, goals     HEP:perform all as tolerated   Access Code: [email or web address]  URL: [email or web address]  Date: 05/30/2024  Prepared by: Mariajose Quintero    Exercises  - Seated Scapular Retraction  - 2 x daily - 7 x weekly - 10 reps  - Seated Shoulder Shrug Circles AROM Backward  - 2 x daily - 7 x weekly - 10 reps  - Circular Shoulder Pendulum with Table Support  - 1 x daily - 7 x weekly - 10 reps  - Flexion-Extension Shoulder Pendulum with Table Support  - 1 x daily - 7 x weekly - 10 reps  - Horizontal Shoulder Pendulum with Table Support  - 1 x daily - 7 x weekly - 10 reps    Response to treatment: no worse pain     ASSESSMENT:   Pt is a 59 yo male c/o bilateral shoulder pain.  Pt with RT RC weakness-infraspinatus, impingement sign and painful shoulder arom, rosie overhead-wash hair, wash back, lift 10#.  SPADI-30/130-23% impairment.  Pt could benefit from PT to address the above impairments and improve function.   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   2014-blood clot to lungs, PE '23     PLAN:  Pt will bring in hand outs from his previous hep to check form and appropriateness   NO DN and gentle with manual with clotting disorder    OP PT PLAN:  Treatment/Interventions: Education/Instruction , Manual Therapy  , Neuromuscular re-education , Self care/home management , Therapeutic activities , and Therapeutic exercise    PT Plan: Skilled PT   PT Frequency: 1-2 times per week  Duration:6-8 weeks, pt has 13 visits of PT left with insurance  Certification Period Start Date:   Certification Period End Date:   Visits Approved: MMO - NO AUTH / 20V - 7 USED/13 left / DEDUCT $3850 - $2922 met / 70/30 COVERAGE/ OOP $9450 - $3576 met / 5/29/24  Rehab Potential: Good  Plan of Care Agreement: Patient         Goals:    STG 6  Pt will be I with HEP  Pt will have full flexion arom RT shoulder without pain  Pt will report 25% reduction in pain level  shoulder   LTG 12  Pt will improve function via 10% reduced SPADI scores 23% impairment currently   Pt will increase RC scapular strength 1/2 to 1 full grade, to improve washing hair and reaching overhead with Rt arm.       LTG 12

## 2024-05-31 ENCOUNTER — APPOINTMENT (OUTPATIENT)
Dept: INTEGRATIVE MEDICINE | Facility: CLINIC | Age: 59
End: 2024-05-31
Payer: COMMERCIAL

## 2024-06-03 ENCOUNTER — APPOINTMENT (OUTPATIENT)
Dept: RADIOLOGY | Facility: HOSPITAL | Age: 59
End: 2024-06-03
Payer: COMMERCIAL

## 2024-06-03 ENCOUNTER — OFFICE VISIT (OUTPATIENT)
Dept: SPORTS MEDICINE | Facility: CLINIC | Age: 59
End: 2024-06-03
Payer: COMMERCIAL

## 2024-06-03 VITALS
HEIGHT: 70 IN | HEART RATE: 84 BPM | WEIGHT: 214 LBS | BODY MASS INDEX: 30.64 KG/M2 | DIASTOLIC BLOOD PRESSURE: 82 MMHG | SYSTOLIC BLOOD PRESSURE: 124 MMHG

## 2024-06-03 DIAGNOSIS — S43.431D TEAR OF RIGHT GLENOID LABRUM, SUBSEQUENT ENCOUNTER: ICD-10-CM

## 2024-06-03 DIAGNOSIS — M25.812 IMPINGEMENT OF LEFT SHOULDER: ICD-10-CM

## 2024-06-03 DIAGNOSIS — M75.101 TEAR OF RIGHT SUPRASPINATUS TENDON: ICD-10-CM

## 2024-06-03 DIAGNOSIS — M25.511 PAIN IN JOINT OF RIGHT SHOULDER: ICD-10-CM

## 2024-06-03 DIAGNOSIS — S43.401D SPRAIN OF RIGHT SHOULDER, SUBSEQUENT ENCOUNTER: ICD-10-CM

## 2024-06-03 DIAGNOSIS — M25.512 ACUTE PAIN OF LEFT SHOULDER: ICD-10-CM

## 2024-06-03 DIAGNOSIS — M19.011 OSTEOARTHRITIS OF RIGHT GLENOHUMERAL JOINT: ICD-10-CM

## 2024-06-03 DIAGNOSIS — M75.102 TEAR OF LEFT SUPRASPINATUS TENDON: ICD-10-CM

## 2024-06-03 DIAGNOSIS — S46.219A TEAR OF BICEPS TENDON: ICD-10-CM

## 2024-06-03 DIAGNOSIS — M19.011 ARTHROSIS OF RIGHT ACROMIOCLAVICULAR JOINT: ICD-10-CM

## 2024-06-03 DIAGNOSIS — M19.011 OSTEOARTHRITIS OF RIGHT ACROMIOCLAVICULAR JOINT: ICD-10-CM

## 2024-06-03 DIAGNOSIS — S46.011D ROTATOR CUFF STRAIN, RIGHT, SUBSEQUENT ENCOUNTER: ICD-10-CM

## 2024-06-03 DIAGNOSIS — S46.211D BICEPS STRAIN, RIGHT, SUBSEQUENT ENCOUNTER: ICD-10-CM

## 2024-06-03 PROBLEM — S43.431A TEAR OF RIGHT GLENOID LABRUM: Status: ACTIVE | Noted: 2024-06-03

## 2024-06-03 PROCEDURE — 99214 OFFICE O/P EST MOD 30 MIN: CPT | Performed by: NURSE PRACTITIONER

## 2024-06-03 PROCEDURE — 1036F TOBACCO NON-USER: CPT | Performed by: NURSE PRACTITIONER

## 2024-06-03 PROCEDURE — 3008F BODY MASS INDEX DOCD: CPT | Performed by: NURSE PRACTITIONER

## 2024-06-03 ASSESSMENT — PATIENT HEALTH QUESTIONNAIRE - PHQ9
1. LITTLE INTEREST OR PLEASURE IN DOING THINGS: NOT AT ALL
SUM OF ALL RESPONSES TO PHQ9 QUESTIONS 1 AND 2: 0
2. FEELING DOWN, DEPRESSED OR HOPELESS: NOT AT ALL

## 2024-06-03 ASSESSMENT — ENCOUNTER SYMPTOMS
MYALGIAS: 1
DEPRESSION: 0
ARTHRALGIAS: 1
OCCASIONAL FEELINGS OF UNSTEADINESS: 0
LOSS OF SENSATION IN FEET: 0
RESPIRATORY NEGATIVE: 1
CONSTITUTIONAL NEGATIVE: 1
CARDIOVASCULAR NEGATIVE: 1

## 2024-06-03 ASSESSMENT — PAIN SCALES - GENERAL: PAINLEVEL: 3

## 2024-06-03 NOTE — PROGRESS NOTES
Established patient  History Of Present Illness  Mikey Salinas is a 58 y.o. male presenting for his follow up evaluation of his RIGHT shoulder. Since last visit, patient states that he feels about the same. Rates current pain as a 3/10 describing it as a constant annoyance. Pt has completed initial evaluation with physical therapy since last visit. He also has seen the chiropractor since last visit, and while talking about his shoulder problems, had MRIs of both shoulders ordered. He completed those through [facility] on 5/28 and they are included below. We reviewed patient MRI results in detail.  Patient verbalizes understanding of results.  We discussed treatment options and will refer patient over to Dr. Dumont for surgical consult for rotator cuff and bicep ruptures.  Patient can follow-up as needed for further complaints of pain or disability.  Patient and spouse verbalized understanding and agreement with plan of care.    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    Surgical History  He has a past surgical history that includes Knee arthroscopy w/ debridement (02/02/2015); MR angio head wo IV contrast (12/15/2016); Lipoma resection (N/A, 09/2023); and [place] tooth extraction (1982).     Social History  He reports that he has never smoked. He has never been exposed to tobacco smoke. He has never used smokeless tobacco. He  "reports current alcohol use of about 2.0 standard drinks of alcohol per week. He reports that he does not use drugs.    Family History  Family History   Problem Relation Name Age of Onset    Macular degeneration Mother Megha     Osteoporosis Mother Megha     Transient ischemic attack Mother Megha     Brain Aneurysm Mother Megha     Hypertension Mother Megha     Other (Heart valve surgery) Father Ray     Other (RHEUMATIC HEART DISEASE) Father Ray     Nephrolithiasis Father Ray     Heart disease Father Ray     Hypertension Father Ray     Breast cancer Sister Peg 58    Irritable bowel syndrome Sister Peg     ROGER disease Sister Peg     Hypertension Sister Peg     Cancer Sister Peg     Prostate cancer Brother      Cancer Brother Yves Salinas     Cancer Other GRAND FATHER      Allergies  Chocolate flavor, Cocoa, and Penicillins    Review of Systems  Review of Systems   Constitutional: Negative.    Respiratory: Negative.     Cardiovascular: Negative.    Musculoskeletal:  Positive for arthralgias and myalgias.   All other systems reviewed and are negative.    Last Recorded Vitals  /82 (BP Location: Right arm, Patient Position: Sitting, BP Cuff Size: Large adult)   Pulse 84   Ht 1.778 m (5' 10\")   Wt 97.1 kg (214 lb)   BMI 30.71 kg/m²      Examination:  Right Shoulder    Edema: Negative.   Ecchymosis/Bruising: Negative.   Percussion Test: Negative.   [place] Test: Negative.   Orientation: Asymmetrical: RIGHT shoulder             Winging Scapula:   Positive  RIGHT     ROM:   FROM, Positive Causes pain  Forward Flexion (0-180 degrees) Positive Causes pain  Extension (0-60 degrees)  ABduction (0-180 degrees) Positive Causes pain  ADduction (30-50 degrees)  External Rotation with elbow at side (0-90 degrees)  Internal Rotation with elbow at side (0-70 degrees)  Horizontal ABduction (0-90 degrees) Positive Causes pain  Horizontal ADduction (0-45 degrees) Positive Causes pain  External Rotation with elbow at 90 degrees " "of ABduction [0-() degrees] Positive Causes pain  Internal Rotation with elbow at 90 degrees of ABduction [0-(70-90) degrees] Positive Causes pain  Positive  Apley's Shoulder Scratch Test Superiorly Tests a Combination of: Flexion, External Rotation, and Scapular ABduction - Decreased due to pain   Positive  Apley's Shoulder Scratch Test Inferiorly Tests a Combination of: Extension, Internal Rotation, and Scapular ADduction - Decreased due to pain     Muscle Strength: Positive   +4/+5:Internal Rotation - subscapularis  +4/+5: External Rotation - infraspinatus and teres minor  +4/+5:ABduction - supraspinatus and deltoid  +4/+5: ABduction with thumbs down and 30 degrees horizontal ADduction - supraspinatus  +4/+5: Palms up with elbow bent to 15 degrees flexion and resisted upward motion - biceps  +4/+5: Simultaneous resisted supination and elbow flexion- biceps  +5/+5:Flexion  +5/+5:Extension  +4/+5:ABduction  +4/+5:ADduction  +4/+5:Internal Rotation at 90 Degrees  +4/+5:External Rotation at 90 Degrees  +5/+5:Internal Rotation at 0 Degrees  +5/+5:External Rotation at 0 Degrees  +4/+5:Apley's Shoulder Scratch Test Superiorly Tests a Combination of: Flexion, External Rotation, and Scapular ABduction  +4/+5:Apley's Shoulder Scratch Test Inferiorly Tests a Combination of: Extension, Internal Rotation, and Scapular ADduction  +5/+5:Triceps  +5/+5: Biceps            Vascular:   Capillary Refillless than 2 seconds , Negative, Symmetrical:  +2/+4: Carotid pulse  +2/+4: Radial pulse  +2/+4: Ulnar pulse  +2/+4: Brachial pulse      Palpation:   Positive  Tenderness to Palpation Rotator Cuff interval, glenohumeral joint space, and biceps tendon.                          Shoulder - Subscapularis:  Bear Hug Test:  Negative.    Lift Off Test:  Negative.   [place] Test:  Negative.   Resisted Elbow Push Down Test:  Negative.    Resisted Lift Off \"\" Test:  Negative.       Shoulder - Supraspinatus:  Full Can Test:  " Positive     Empty Can Test:  Positive     Resisted Elbow Push Up Test: Negative  Drop Arm Test:  Positive       Shoulder - Infraspinatus:  Resisted ER at 0 degrees ABduction:  Negative. with arm at side.          Shoulder - Teres Minor:  Resisted ER at 90 degrees ABduction:  Negative.          Shoulder - Biceps:  Speeds Test: Positive   Yergason Test: Positive      Shoulder - Labrum/Instability:  Anterior Release/[place]prise Test: Negative  Bicep Flexion at 90 degrees ABduction Test: Negative    Posterior Apprehension Test: Negative  Posterior Release/Surprise Test: Negative   Clunk Test:  Negative  Grind Test:  Positive  [place] Test:  Negative Thumbs Down, Negative Thumbs Up.   Crank Test:  Positive  R shoulder     Imaging and Diagnostics Review:  No new radiographs were obtained today.    Assessment   1. Pain in joint of right shoulder    2. Osteoarthritis of right acromioclavicular joint    3. Osteoarthritis of right glenohumeral joint    4. Biceps strain, right, subsequent encounter    5. Rotator cuff strain, right, subsequent encounter    6. Sprain of right shoulder, subsequent encounter    7. Tear of right supraspinatus tendon    8. Tear of biceps tendon    9. Tear of right glenoid labrum, subsequent encounter    10. Arthrosis of right acromioclavicular joint    11. Acute pain of left shoulder    12. Tear of left supraspinatus tendon    13. Impingement of left shoulder        Plan   Treatment or Intervention:  May use RICE therapy as needed  Continue physical therapy 1-2 times a week for 8-10 weeks with manual therapy as well as dry needling and IASTM  Additionally reviewed modifying activities to be performed while exercising as well as activities with daily living  Discussed in detail with the patient to the level of their understanding the possibility in the future of regenerative injections versus corticosteroid injections  Recommendation over-the-counter calcium 500mg, 3 times a day with vitamin-D3  1112-9967+ units a day with food as well as a daily multivitamin  Recommendation over-the-counter Move Free for joint health  May take OTC Tylenol Extra Strength or OTC Tylenol Arthritis, taking one every 6-8 hours with food as needed for pain management.   Patient advised regarding the risks and/or potential adverse reactions and/or side effects of any prescribed medications along with any over-the-counter medications or any supplements used. Patient advised to seek immediate medical care if any adverse reactions occur. The patient and/or patient(s) parent(s) verbalized their understanding.  Possibility in future of MR Arthrogram of RIGHT SHOULDER to rule out tendon vs ligament vs labral tear vs fracture vs other. MSK to read    Diagnostic studies:  MRI LEFT SHOULDER- [facility] RAD[facility]OLOGY 5/28/24  Referring Physician: Hermes Dewitt DC    There is a full-thickness and full width tear of the supraspinatus tendon. The tear measures 15x15 mm.    Normal supraspinatus muscle without edema or atrophy    The infraspinatus tendon is normal in signal and configuration. There is no tendon tear, tendinopathy or surface fraying.    Normal subscapularis muscle without edema or atrophy.    The teres minor tendon is normal in signal and configuration. There is no tendon tear, tendinopathy or surface fraying.    Normal teres minor muscle without edema or atrophy.    Normal deltoid muscle without edema or atrophy.    The long head of the biceps tendon is normal in signal and morphology and is located within the bicipital groove without subluxation. There is no tear or attenuation. The intracapsular segment appears normal.    The glenoid labrum appears normal. There is no labral tear.    The capsular labral-ligamentous complex appears normal.    There are mild hypertrophic changes of the acromioclavicular joint. There is mild impingement.    The coracohumeral, coracoclavicular and coracoacromial ligaments appear normal.    Bone marrow  signal is normal. There is no fracture or other acute appearing bony abnormality.    There is no glenohumeral joint effusion.    There is no subacromial/subdeltoid or subcoracoid bursitis.    There is no soft tissue mass.    IMPRESSION:  Full thickness and full width tear of the supraspinatus tendon.  Acromioclavicular joint hypertrophy with mild impingement.  ---------------------------------------------------------  [facility]  Referring Physician: Hermes Dewitt DC    There is abnormal signal and irregularity of the posterior superior and posterior labrum consistent with a torn labrum.    Normal axillary recess without secondary signs of adhesive capsulitis.    The glenohumeral joint space appears normal.    There are mild hypertrophic changes of the acromioclavicular joint. There is mild impingement.    The coracohumeral, coracoclavicular and coracoacromial ligaments appear normal.    Bone marrow signal is normal. There is no fracture or other acute appearing bony abnormality.    There is no glenohumeral joint effusion.    There is no subacromial/subdeltoid or subcoracoid bursitis.    There is no soft tissue mass.    IMPRESSION:  Full thickness and near complete full width tear of the supraspinatus tendon.    Complete tear of the long head of the biceps tendon    Tear of the posterior superior labrum.    Acromioclavicular joint arthrosis with impingement.  ----------------------------------------------------------  Urgent Care Xray  Narrative: Interpreted By:  Urbano Weir,   STUDY:  XR URGENT CARE XRAY; 5/8/2024 4:28 pm      INDICATION:  Signs/Symptoms:Right shoulder pain      COMPARISON:  None.      ACCESSION NUMBER(S):  [id number]      ORDERING CLINICIAN:  LETI MITCHELL      TECHNIQUE:  Three-view radiographs of the right shoulder were obtained.      FINDINGS:  There is no fracture or subluxation.  Degenerative changes of the acromioclavicular and glenohumeral joint  are  present, with joint space narrowing and small osteophytes.  Degenerative changes, disc disease and marked S-type scoliosis also  involve the visualized spine. The alignment is anatomic.  The soft tissues are unremarkable.      Impression: Degenerative changes as above. Thoracic scoliosis. No acute fracture  or subluxation.      Signed by: Urbano Weir 5/8/2024 4:56 PM  Dictation workstation:   [id number]     Activity Instructions, Restrictions, and Accommodations:  Referral to Dr. Dumont    Consultations/Referrals:  Physical therapy    Follow-up:  Follow up as needed based off referral to Dr. Dumont  Total appointment time _30_ minutes. Greater than 50% spent counseling patient on results of physical exam, treatment options as well as results of ordered imaging and treatment for results, need for PT and expected outcomes, as well as discussing possible medications.    PATRICIA GILBERT on 6/3/24 at 2:25 PM.     Niraj Schwartz CNP

## 2024-06-03 NOTE — PATIENT INSTRUCTIONS
May use RICE therapy as needed  Continue physical therapy 1-2 times a week for 8-10 weeks with manual therapy as well as dry needling and IASTM  Additionally reviewed modifying activities to be performed while exercising as well as activities with daily living  Discussed in detail with the patient to the level of their understanding the possibility in the future of regenerative injections versus corticosteroid injections  Recommendation over-the-counter calcium 500mg, 3 times a day with vitamin-D3 1159-3458+ units a day with food as well as a daily multivitamin  Recommendation over-the-counter Move Free for joint health  May take OTC Tylenol Extra Strength or OTC Tylenol Arthritis, taking one every 6-8 hours with food as needed for pain management.   Patient advised regarding the risks and/or potential adverse reactions and/or side effects of any prescribed medications along with any over-the-counter medications or any supplements used. Patient advised to seek immediate medical care if any adverse reactions occur. The patient and/or patient(s) parent(s) verbalized their understanding.  Possibility in future of MR Arthrogram of RIGHT SHOULDER to rule out tendon vs ligament vs labral tear vs fracture vs other. MSK to read  Follow up as needed based off referral to Dr. Dumont

## 2024-06-04 ENCOUNTER — APPOINTMENT (OUTPATIENT)
Dept: PHYSICAL THERAPY | Facility: CLINIC | Age: 59
End: 2024-06-04
Payer: COMMERCIAL

## 2024-06-10 ENCOUNTER — APPOINTMENT (OUTPATIENT)
Dept: PHYSICAL THERAPY | Facility: CLINIC | Age: 59
End: 2024-06-10
Payer: COMMERCIAL

## 2024-06-12 ENCOUNTER — APPOINTMENT (OUTPATIENT)
Dept: PHYSICAL THERAPY | Facility: CLINIC | Age: 59
End: 2024-06-12
Payer: COMMERCIAL

## 2024-06-18 ENCOUNTER — HOSPITAL ENCOUNTER (OUTPATIENT)
Dept: RADIOLOGY | Facility: CLINIC | Age: 59
Discharge: HOME | End: 2024-06-18
Payer: COMMERCIAL

## 2024-06-18 ENCOUNTER — OFFICE VISIT (OUTPATIENT)
Dept: ORTHOPEDIC SURGERY | Facility: CLINIC | Age: 59
End: 2024-06-18
Payer: COMMERCIAL

## 2024-06-18 DIAGNOSIS — M25.812 IMPINGEMENT OF LEFT SHOULDER: ICD-10-CM

## 2024-06-18 DIAGNOSIS — S43.401D SPRAIN OF RIGHT SHOULDER, SUBSEQUENT ENCOUNTER: ICD-10-CM

## 2024-06-18 DIAGNOSIS — M19.011 OSTEOARTHRITIS OF RIGHT GLENOHUMERAL JOINT: ICD-10-CM

## 2024-06-18 DIAGNOSIS — S46.219A TEAR OF BICEPS TENDON: ICD-10-CM

## 2024-06-18 DIAGNOSIS — M25.511 PAIN IN JOINT OF RIGHT SHOULDER: ICD-10-CM

## 2024-06-18 DIAGNOSIS — M75.101 TEAR OF RIGHT SUPRASPINATUS TENDON: ICD-10-CM

## 2024-06-18 DIAGNOSIS — S46.211D BICEPS STRAIN, RIGHT, SUBSEQUENT ENCOUNTER: ICD-10-CM

## 2024-06-18 DIAGNOSIS — M25.511 RIGHT SHOULDER PAIN, UNSPECIFIED CHRONICITY: ICD-10-CM

## 2024-06-18 DIAGNOSIS — M25.512 LEFT SHOULDER PAIN, UNSPECIFIED CHRONICITY: ICD-10-CM

## 2024-06-18 DIAGNOSIS — M19.011 ARTHROSIS OF RIGHT ACROMIOCLAVICULAR JOINT: ICD-10-CM

## 2024-06-18 DIAGNOSIS — M19.011 OSTEOARTHRITIS OF RIGHT ACROMIOCLAVICULAR JOINT: ICD-10-CM

## 2024-06-18 DIAGNOSIS — M75.102 TEAR OF LEFT SUPRASPINATUS TENDON: ICD-10-CM

## 2024-06-18 DIAGNOSIS — M25.512 ACUTE PAIN OF LEFT SHOULDER: ICD-10-CM

## 2024-06-18 DIAGNOSIS — S46.011D ROTATOR CUFF STRAIN, RIGHT, SUBSEQUENT ENCOUNTER: ICD-10-CM

## 2024-06-18 DIAGNOSIS — S43.431D TEAR OF RIGHT GLENOID LABRUM, SUBSEQUENT ENCOUNTER: ICD-10-CM

## 2024-06-18 PROCEDURE — 99214 OFFICE O/P EST MOD 30 MIN: CPT | Performed by: STUDENT IN AN ORGANIZED HEALTH CARE EDUCATION/TRAINING PROGRAM

## 2024-06-18 PROCEDURE — 73030 X-RAY EXAM OF SHOULDER: CPT | Mod: BILATERAL PROCEDURE | Performed by: RADIOLOGY

## 2024-06-18 PROCEDURE — 3008F BODY MASS INDEX DOCD: CPT | Performed by: STUDENT IN AN ORGANIZED HEALTH CARE EDUCATION/TRAINING PROGRAM

## 2024-06-18 PROCEDURE — 73030 X-RAY EXAM OF SHOULDER: CPT | Mod: 50

## 2024-06-18 PROCEDURE — 1036F TOBACCO NON-USER: CPT | Performed by: STUDENT IN AN ORGANIZED HEALTH CARE EDUCATION/TRAINING PROGRAM

## 2024-06-18 ASSESSMENT — PAIN SCALES - GENERAL: PAINLEVEL_OUTOF10: 3

## 2024-06-18 ASSESSMENT — PAIN - FUNCTIONAL ASSESSMENT: PAIN_FUNCTIONAL_ASSESSMENT: 0-10

## 2024-06-26 ENCOUNTER — TREATMENT (OUTPATIENT)
Dept: PHYSICAL THERAPY | Facility: CLINIC | Age: 59
End: 2024-06-26
Payer: COMMERCIAL

## 2024-06-26 ENCOUNTER — OFFICE VISIT (OUTPATIENT)
Dept: PRIMARY CARE | Facility: CLINIC | Age: 59
End: 2024-06-26
Payer: COMMERCIAL

## 2024-06-26 VITALS
HEART RATE: 67 BPM | HEIGHT: 70 IN | SYSTOLIC BLOOD PRESSURE: 120 MMHG | OXYGEN SATURATION: 95 % | WEIGHT: 219.2 LBS | TEMPERATURE: 97.8 F | DIASTOLIC BLOOD PRESSURE: 80 MMHG | BODY MASS INDEX: 31.38 KG/M2

## 2024-06-26 DIAGNOSIS — R20.9 SKIN SENSATION DISTURBANCE: Primary | ICD-10-CM

## 2024-06-26 DIAGNOSIS — M25.519 SHOULDER PAIN, UNSPECIFIED CHRONICITY, UNSPECIFIED LATERALITY: ICD-10-CM

## 2024-06-26 DIAGNOSIS — R53.83 OTHER FATIGUE: ICD-10-CM

## 2024-06-26 PROCEDURE — 3008F BODY MASS INDEX DOCD: CPT | Performed by: FAMILY MEDICINE

## 2024-06-26 PROCEDURE — 99214 OFFICE O/P EST MOD 30 MIN: CPT | Performed by: FAMILY MEDICINE

## 2024-06-26 PROCEDURE — 97110 THERAPEUTIC EXERCISES: CPT | Mod: GP | Performed by: PHYSICAL THERAPIST

## 2024-06-26 RX ORDER — GABAPENTIN 100 MG/1
100 CAPSULE ORAL 3 TIMES DAILY PRN
Qty: 90 CAPSULE | Refills: 0 | Status: SHIPPED | OUTPATIENT
Start: 2024-06-26 | End: 2024-12-23

## 2024-06-26 ASSESSMENT — PATIENT HEALTH QUESTIONNAIRE - PHQ9
2. FEELING DOWN, DEPRESSED OR HOPELESS: NOT AT ALL
SUM OF ALL RESPONSES TO PHQ9 QUESTIONS 1 AND 2: 0
7. TROUBLE CONCENTRATING ON THINGS, SUCH AS READING THE NEWSPAPER OR WATCHING TELEVISION: NOT AT ALL
3. TROUBLE FALLING OR STAYING ASLEEP OR SLEEPING TOO MUCH: MORE THAN HALF THE DAYS
6. FEELING BAD ABOUT YOURSELF - OR THAT YOU ARE A FAILURE OR HAVE LET YOURSELF OR YOUR FAMILY DOWN: NOT AT ALL
4. FEELING TIRED OR HAVING LITTLE ENERGY: SEVERAL DAYS
SUM OF ALL RESPONSES TO PHQ9 QUESTIONS 1 AND 2: 1
10. IF YOU CHECKED OFF ANY PROBLEMS, HOW DIFFICULT HAVE THESE PROBLEMS MADE IT FOR YOU TO DO YOUR WORK, TAKE CARE OF THINGS AT HOME, OR GET ALONG WITH OTHER PEOPLE: NOT DIFFICULT AT ALL
SUM OF ALL RESPONSES TO PHQ QUESTIONS 1-9: 4
5. POOR APPETITE OR OVEREATING: NOT AT ALL
1. LITTLE INTEREST OR PLEASURE IN DOING THINGS: NOT AT ALL
2. FEELING DOWN, DEPRESSED OR HOPELESS: SEVERAL DAYS
1. LITTLE INTEREST OR PLEASURE IN DOING THINGS: NOT AT ALL
9. THOUGHTS THAT YOU WOULD BE BETTER OFF DEAD, OR OF HURTING YOURSELF: NOT AT ALL
8. MOVING OR SPEAKING SO SLOWLY THAT OTHER PEOPLE COULD HAVE NOTICED. OR THE OPPOSITE, BEING SO FIGETY OR RESTLESS THAT YOU HAVE BEEN MOVING AROUND A LOT MORE THAN USUAL: NOT AT ALL

## 2024-06-26 ASSESSMENT — ANXIETY QUESTIONNAIRES
IF YOU CHECKED OFF ANY PROBLEMS ON THIS QUESTIONNAIRE, HOW DIFFICULT HAVE THESE PROBLEMS MADE IT FOR YOU TO DO YOUR WORK, TAKE CARE OF THINGS AT HOME, OR GET ALONG WITH OTHER PEOPLE: NOT DIFFICULT AT ALL
GAD7 TOTAL SCORE: 4
4. TROUBLE RELAXING: SEVERAL DAYS
6. BECOMING EASILY ANNOYED OR IRRITABLE: SEVERAL DAYS
5. BEING SO RESTLESS THAT IT IS HARD TO SIT STILL: NOT AT ALL
3. WORRYING TOO MUCH ABOUT DIFFERENT THINGS: SEVERAL DAYS
1. FEELING NERVOUS, ANXIOUS, OR ON EDGE: SEVERAL DAYS
7. FEELING AFRAID AS IF SOMETHING AWFUL MIGHT HAPPEN: NOT AT ALL
2. NOT BEING ABLE TO STOP OR CONTROL WORRYING: NOT AT ALL

## 2024-06-26 ASSESSMENT — LIFESTYLE VARIABLES
HOW OFTEN DO YOU HAVE SIX OR MORE DRINKS ON ONE OCCASION: NEVER
AUDIT-C TOTAL SCORE: 2
HOW MANY STANDARD DRINKS CONTAINING ALCOHOL DO YOU HAVE ON A TYPICAL DAY: 1 OR 2
HOW OFTEN DO YOU HAVE A DRINK CONTAINING ALCOHOL: 2-4 TIMES A MONTH
HOW OFTEN DO YOU HAVE A DRINK CONTAINING ALCOHOL: MONTHLY OR LESS
SKIP TO QUESTIONS 9-10: 1
HOW MANY STANDARD DRINKS CONTAINING ALCOHOL DO YOU HAVE ON A TYPICAL DAY: 1 OR 2

## 2024-06-26 ASSESSMENT — PAIN SCALES - GENERAL: PAINLEVEL: 0-NO PAIN

## 2024-06-26 NOTE — PROGRESS NOTES
Physical Therapy    Physical Therapy Treatment    Patient Name: Mikey Salinas  MRN: [id number]  Today's Date: 6/26/2024    Time Calculation  Start Time: 1255  Stop Time: 1341  Time Calculation (min): 46 min    Visit #: 2 out of 12 goals  Insurance: Visit 2/13 insurance   Evaluation date: 5/30/24 July 16th pt has MD follow up     Current Problem:   1. Shoulder pain, unspecified chronicity, unspecified laterality  Follow Up In Physical Therapy          SUBJECTIVE:   Pt states he saw Dr Dumont and he indicted to fix it surgery would be required.  Pt was told to try PT.  Pt reports good tolerance to his program given at his IE nearly a month ago   New MRI done:   Per 6/3 note jazmine shay on 6/18  Left shoulder  Full thickness and full width tear of the supraspinatus tendon.  Acromioclavicular joint hypertrophy with mild impingement.  IMPRESSION:RT  Full thickness and near complete full width tear of the supraspinatus tendon.     Complete tear of the long head of the biceps tendon     Tear of the posterior superior labrum.     Acromioclavicular joint arthrosis with impingement.    Precautions:  blood thinners clotting disorder no DN     Scoliosis 16 deg,Grade 1 anterolisthesis L5/ S1,  bilateral pars defects,remote- Mild  compression fracture deformities of L2 and L3         Pain:   Start of session: LT 1/10, RT 3/10       OBJECTIVE:    Stephen sign on RT     UE AROM:  Shoulder: RT/LT  Flex 155/160 painful arc 105 on right, aarom pain at end range with rope and pulley   Abd 155 end range pain /165  ER 75 shoulder blade pain, 80 LT  IR mild loss bilateral not painful Rt 1 inch less        Treatments:  Therapeutic Exercise: (45 minutes)   Posture  sit tall   Scapular adduction 2x10  Rope and pulley aarom RT flex/abd/POSx15   Shrug with backward roll x15  Codman's x10 EA  for/back/side, circles-raggedy heather doll   GH isometrics wall sub max x5/5 sec hold ea arm:flexion, IR, ER   Stand:  -Rows yellow 2x10   -shoulder extension  yellow 2x10     Manual Therapy: ( minutes)    Gait Training: ( minutes)    Neuromuscular Re-education: ( minutes)    Therapeutic Activity: ( minutes)       HEP:  HEP:perform all as tolerated   Access Code: [email or web address]  URL: [email or web address]  Date: 05/30/2024  Prepared by: Mariajose Quintero     Exercises  - Seated Scapular Retraction  - 2 x daily - 7 x weekly - 10 reps  - Seated Shoulder Shrug Circles AROM Backward  - 2 x daily - 7 x weekly - 10 reps  - Circular Shoulder Pendulum with Table Support  - 1 x daily - 7 x weekly - 10 reps  - Flexion-Extension Shoulder Pendulum with Table Support  - 1 x daily - 7 x weekly - 10 reps  - Horizontal Shoulder Pendulum with Table Support  - 1 x daily - 7 x weekly - 10 reps  Access Code: [email or web address] pt to perform as tolerated   URL: [email or web address]  Date: 06/26/2024  Prepared by: Mariajose Quintero    Exercises  - Standing Shoulder Row with Anchored Resistance  - 3 x weekly - 2 sets - 10 reps  - Shoulder extension with resistance - Neutral  - 3 x weekly - 2 sets - 10 reps  - Isometric Shoulder Flexion at Wall  - 3 x weekly - 10 reps - 5 hold  - Standing Isometric Shoulder Internal Rotation at Doorway  - 3 x weekly - 10 reps - 5 hold  - Standing Isometric Shoulder External Rotation with Doorway  - 3 x weekly - 10 reps - 5 hold  - Isometric Shoulder Extension at Wall  - 3 x weekly - 10 reps - 5 hold  Can progress isometrics to 2 sets as time passes.    ASSESSMENT:   Discussed with pt his options as limited PT sessions left for year(pt can verify this with his insurance with his circumstances) and possibility of needing surgery  Pt reports good tolerance to new gamaliel no sharp pains   Shoulder arom RT slightly decreased form last session-painful arc still seen, left shoulder good motion and not reporting pain   Post session pain: better down 1 level on RT, L same     PLAN:  Update Hep as tolerated, pt will let staff know if he wants to schedule additional follow up before his  MD appt  OP PT PLAN:  Treatment/Interventions: Education/Instruction , Manual Therapy  , Neuromuscular re-education , Self care/home management , Therapeutic activities , and Therapeutic exercise    PT Plan: Skilled PT   PT Frequency: 1-2 times per week  Duration:6-7  Certification Period Start Date:   Certification Period End Date:   Visits Approved: 13 at eval left of 20 for year   Rehab Potential: Good  Plan of Care Agreement: Patient         Goals:    STG 6  Pt will be I with HEP>>progressing   Pt will have full flexion arom RT shoulder without pain  Pt will report 25% reduction in pain level shoulder   LTG 12  Pt will improve function via 10% reduced SPADI scores 23% impairment currently   Pt will increase RC scapular strength 1/2 to 1 full grade, to improve washing hair and reaching overhead with Rt arm.

## 2024-06-28 ENCOUNTER — TRANSCRIBE ORDERS (OUTPATIENT)
Dept: ORTHOPEDIC SURGERY | Facility: HOSPITAL | Age: 59
End: 2024-06-28
Payer: COMMERCIAL

## 2024-06-28 DIAGNOSIS — Z13.828 SCOLIOSIS CONCERN: ICD-10-CM

## 2024-07-02 ENCOUNTER — APPOINTMENT (OUTPATIENT)
Dept: ORTHOPEDIC SURGERY | Facility: CLINIC | Age: 59
End: 2024-07-02
Payer: COMMERCIAL

## 2024-07-09 ENCOUNTER — PATIENT MESSAGE (OUTPATIENT)
Dept: PRIMARY CARE | Facility: CLINIC | Age: 59
End: 2024-07-09
Payer: COMMERCIAL

## 2024-07-10 ENCOUNTER — LAB (OUTPATIENT)
Dept: LAB | Facility: LAB | Age: 59
End: 2024-07-10
Payer: COMMERCIAL

## 2024-07-10 DIAGNOSIS — R53.83 OTHER FATIGUE: ICD-10-CM

## 2024-07-10 PROCEDURE — 84402 ASSAY OF FREE TESTOSTERONE: CPT

## 2024-07-10 PROCEDURE — 36415 COLL VENOUS BLD VENIPUNCTURE: CPT

## 2024-07-11 DIAGNOSIS — R20.9 SKIN SENSATION DISTURBANCE: ICD-10-CM

## 2024-07-11 RX ORDER — GABAPENTIN 100 MG/1
100 CAPSULE ORAL 3 TIMES DAILY PRN
Qty: 90 CAPSULE | Refills: 0 | Status: SHIPPED | OUTPATIENT
Start: 2024-07-11 | End: 2025-01-07

## 2024-07-11 NOTE — TELEPHONE ENCOUNTER
PT CALLING REQUESTING EXTENTION ON HIS GABAPENTIN RX HE WILL BE GOING ON VACATION IT WILL RUN OUT ON 7/22 SENT TO [facility] [address]    follow up with own ob/gyn in 2 weeks. no sex, pelvic rest, no heavy lifting

## 2024-07-12 ENCOUNTER — PATIENT MESSAGE (OUTPATIENT)
Dept: PRIMARY CARE | Facility: CLINIC | Age: 59
End: 2024-07-12
Payer: COMMERCIAL

## 2024-07-12 DIAGNOSIS — R20.9 SKIN SENSATION DISTURBANCE: Primary | ICD-10-CM

## 2024-07-13 LAB
TESTOSTERONE FREE (CHAN): 49.3 PG/ML (ref 35–155)
TESTOSTERONE,TOTAL,LC-MS/MS: 421 NG/DL (ref 250–1100)

## 2024-07-16 ENCOUNTER — OFFICE VISIT (OUTPATIENT)
Dept: ORTHOPEDIC SURGERY | Facility: CLINIC | Age: 59
End: 2024-07-16
Payer: COMMERCIAL

## 2024-07-16 DIAGNOSIS — M75.101 TEAR OF RIGHT SUPRASPINATUS TENDON: Primary | ICD-10-CM

## 2024-07-16 PROCEDURE — 99214 OFFICE O/P EST MOD 30 MIN: CPT | Performed by: STUDENT IN AN ORGANIZED HEALTH CARE EDUCATION/TRAINING PROGRAM

## 2024-07-16 PROCEDURE — 2500000004 HC RX 250 GENERAL PHARMACY W/ HCPCS (ALT 636 FOR OP/ED): Performed by: STUDENT IN AN ORGANIZED HEALTH CARE EDUCATION/TRAINING PROGRAM

## 2024-07-16 PROCEDURE — 2500000005 HC RX 250 GENERAL PHARMACY W/O HCPCS: Performed by: STUDENT IN AN ORGANIZED HEALTH CARE EDUCATION/TRAINING PROGRAM

## 2024-07-16 PROCEDURE — 20610 DRAIN/INJ JOINT/BURSA W/O US: CPT | Mod: RT | Performed by: STUDENT IN AN ORGANIZED HEALTH CARE EDUCATION/TRAINING PROGRAM

## 2024-07-16 PROCEDURE — 1036F TOBACCO NON-USER: CPT | Performed by: STUDENT IN AN ORGANIZED HEALTH CARE EDUCATION/TRAINING PROGRAM

## 2024-07-16 PROCEDURE — 3008F BODY MASS INDEX DOCD: CPT | Performed by: STUDENT IN AN ORGANIZED HEALTH CARE EDUCATION/TRAINING PROGRAM

## 2024-07-16 RX ORDER — TRIAMCINOLONE ACETONIDE 40 MG/ML
40 INJECTION, SUSPENSION INTRA-ARTICULAR; INTRAMUSCULAR
Status: COMPLETED | OUTPATIENT
Start: 2024-07-16 | End: 2024-07-16

## 2024-07-16 RX ORDER — LIDOCAINE HYDROCHLORIDE 10 MG/ML
4 INJECTION INFILTRATION; PERINEURAL
Status: COMPLETED | OUTPATIENT
Start: 2024-07-16 | End: 2024-07-16

## 2024-07-16 ASSESSMENT — PAIN - FUNCTIONAL ASSESSMENT: PAIN_FUNCTIONAL_ASSESSMENT: 0-10

## 2024-07-16 ASSESSMENT — PAIN SCALES - GENERAL: PAINLEVEL_OUTOF10: 2

## 2024-07-16 NOTE — PROGRESS NOTES
Mikey Salinas is a 58 y.o. year-old  male  he returns regarding his shoulders.  His left shoulder is actually doing fine not really having pain right side is still quite painful he continues with physical therapy.      Past Medical, Family, and Social History reviewed     Review of Systems  A complete review of systems was conducted, pertinent only to the HPI noted above.    Physical Exam  GEN: Alert and Oriented x 3  Constitutional: Well appearing, in no apparent distress.  Eyes: sclera anicteric  ENT: hearing appropriate for normal conversation, neck appears symmetric with no gross thyromegaly  Pulm: No labored breathing, no wheezing  CVS: Regular rate and rhythm  PSY: normal mood and affect  Skin: No rashes, erythema, or induration around shoulder     Focused Musculoskeletal Exam:     Side: Bilateral shoulder:  PROM:   FE (170)   ER 60 ABER/ABIR: 90/90  AROM:   FE (170)   ER 45 IR T8  Strength:  Supra [5/5] Infra [5/5] Subscap [5/5]  Abd [5/5]  Positive Stephen's on right  Special Tests  Shoulder  positive Kadeem and Loy      ACJ:  AC TTP: [neg]  Cross Arm [neg]   AC prominence [no]      [Sensation intact Ax/median/ulnar/radial distributions  Motor intact Ax/median/radial/ulnar/AIN/PIN    X-rays  and MRI of the shoulder independently viewed and interpreted: High-grade partial-thickness rotator cuff tear on the right side would likely biceps rupture, left with what appears to be a small full-thickness rotator cuff tear without retraction.    L Inj/Asp: R subacromial bursa on 7/16/2024 12:34 PM  Indications: pain  Details: 21 G needle, posterior approach  Medications: 40 mg triamcinolone acetonide 40 mg/mL; 4 mL lidocaine 10 mg/mL (1 %)  Outcome: tolerated well, no immediate complications  Procedure, treatment alternatives, risks and benefits explained, specific risks discussed. Consent was given by the patient. Immediately prior to procedure a time out was called to verify the correct patient, procedure,  equipment, support staff and site/side marked as required. Patient was prepped and draped in the usual sterile fashion.             The patient history, physical examination and imaging studies are consistent with the diagnosis above.    After a thorough discussion with the patient including expectations, I would recommend we continue a conservative program for now.  I reviewed he has a partial rotator cuff tear on the right and a small full-thickness on the left.  I reviewed this is amenable to nonoperative treatment we also discussed the potential for surgical treatment.  We discussed home/formal PT (deltoid isometrics, RTC strengthening, scapular stabilizers, stretching) and activity modification including avoidance of the positions and activities that provoke symptoms, including athletics.  We also discussed the ice and NSAIDS/tylenol.  We discussed the possibility of a corticosteroid injection and then I have provided that today at his request for his right shoulder.  If his symptoms do not resolve he can return which she can either consider the potential for surgery    We will see them  back in 4-6 weeks for further follow up.

## 2024-07-16 NOTE — PROGRESS NOTES
Mikey Salinas is a 58 y.o. year-old  male  he is a new patient to our office and presents with a chief complaint of Bilateral shoulder pain.  He has had ongoing shoulder pain he has not had any injection he has some recent MRIs.  He did go 1 a course of oral steroids but the pain returns.  He did say that since the steroids he has had some numbness and tingling in his hands.      Past Medical, Family, and Social History reviewed     Review of Systems  A complete review of systems was conducted, pertinent only to the HPI noted above.    Physical Exam  GEN: Alert and Oriented x 3  Constitutional: Well appearing, in no apparent distress.  Eyes: sclera anicteric  ENT: hearing appropriate for normal conversation, neck appears symmetric with no gross thyromegaly  Pulm: No labored breathing, no wheezing  CVS: Regular rate and rhythm  PSY: normal mood and affect  Skin: No rashes, erythema, or induration around shoulder     Focused Musculoskeletal Exam:     Side: Bilateral shoulder:  PROM:   FE (170)   ER 60 ABER/ABIR: 90/90  AROM:   FE (170)   ER 45 IR T8  Strength:  Supra [5/5] Infra [5/5] Subscap [5/5]  Abd [5/5]  Positive Stephen's on right  Special Tests  Shoulder  positive Neer and Granado      ACJ:  AC TTP: [neg]  Cross Arm [neg]   AC prominence [no]      [Sensation intact Ax/median/ulnar/radial distributions  Motor intact Ax/median/radial/ulnar/AIN/PIN    X-rays  and MRI of the shoulder independently viewed and interpreted: High-grade partial-thickness rotator cuff tear on the right side would likely biceps rupture, left with what appears to be a small full-thickness rotator cuff tear without retraction.    The patient history, physical examination and imaging studies are consistent with the diagnosis above.    After a thorough discussion with the patient including expectations, I would recommend we continue a conservative program for now.  I reviewed he has a partial rotator cuff tear on the right and a small  full-thickness on the left.  I reviewed this is amenable to nonoperative treatment we also discussed the potential for surgical treatment.  We discussed home/formal PT (deltoid isometrics, RTC strengthening, scapular stabilizers, stretching) and activity modification including avoidance of the positions and activities that provoke symptoms, including athletics.  We also discussed the ice and NSAIDS/tylenol.  We discussed the possibility of a corticosteroid injection although would like to hold off at this time.  If his symptoms do not resolve he can return which she can either consider corticosteroid and the potential for surgery    We will see them  back in 4-6 weeks for further follow up.

## 2024-07-30 ENCOUNTER — APPOINTMENT (OUTPATIENT)
Dept: SPORTS MEDICINE | Facility: CLINIC | Age: 59
End: 2024-07-30
Payer: COMMERCIAL

## 2024-08-02 ENCOUNTER — APPOINTMENT (OUTPATIENT)
Dept: INTEGRATIVE MEDICINE | Facility: CLINIC | Age: 59
End: 2024-08-02
Payer: COMMERCIAL

## 2024-08-07 ENCOUNTER — HOSPITAL ENCOUNTER (OUTPATIENT)
Dept: RADIOLOGY | Facility: EXTERNAL LOCATION | Age: 59
Discharge: HOME | End: 2024-08-07

## 2024-08-07 ENCOUNTER — APPOINTMENT (OUTPATIENT)
Dept: RADIOLOGY | Facility: HOSPITAL | Age: 59
End: 2024-08-07
Payer: COMMERCIAL

## 2024-08-07 ENCOUNTER — HOSPITAL ENCOUNTER (EMERGENCY)
Facility: HOSPITAL | Age: 59
Discharge: HOME | End: 2024-08-07
Payer: COMMERCIAL

## 2024-08-07 VITALS
HEIGHT: 70 IN | WEIGHT: 218 LBS | SYSTOLIC BLOOD PRESSURE: 152 MMHG | DIASTOLIC BLOOD PRESSURE: 94 MMHG | BODY MASS INDEX: 31.21 KG/M2 | RESPIRATION RATE: 15 BRPM | TEMPERATURE: 97.7 F | OXYGEN SATURATION: 96 % | HEART RATE: 69 BPM

## 2024-08-07 DIAGNOSIS — M79.672 BILATERAL FOOT PAIN: ICD-10-CM

## 2024-08-07 DIAGNOSIS — M79.671 BILATERAL FOOT PAIN: ICD-10-CM

## 2024-08-07 DIAGNOSIS — M79.89 CYST OF SOFT TISSUE: Primary | ICD-10-CM

## 2024-08-07 PROCEDURE — 99284 EMERGENCY DEPT VISIT MOD MDM: CPT | Mod: 25

## 2024-08-07 PROCEDURE — 76881 US COMPL JOINT R-T W/IMG: CPT

## 2024-08-07 PROCEDURE — 76882 US LMTD JT/FCL EVL NVASC XTR: CPT | Performed by: RADIOLOGY

## 2024-08-07 ASSESSMENT — PAIN DESCRIPTION - LOCATION: LOCATION: FOOT

## 2024-08-07 ASSESSMENT — PAIN - FUNCTIONAL ASSESSMENT: PAIN_FUNCTIONAL_ASSESSMENT: 0-10

## 2024-08-07 ASSESSMENT — COLUMBIA-SUICIDE SEVERITY RATING SCALE - C-SSRS
1. IN THE PAST MONTH, HAVE YOU WISHED YOU WERE DEAD OR WISHED YOU COULD GO TO SLEEP AND NOT WAKE UP?: NO
2. HAVE YOU ACTUALLY HAD ANY THOUGHTS OF KILLING YOURSELF?: NO
6. HAVE YOU EVER DONE ANYTHING, STARTED TO DO ANYTHING, OR PREPARED TO DO ANYTHING TO END YOUR LIFE?: NO

## 2024-08-07 ASSESSMENT — PAIN DESCRIPTION - ORIENTATION: ORIENTATION: LEFT;RIGHT

## 2024-08-07 ASSESSMENT — PAIN SCALES - GENERAL: PAINLEVEL_OUTOF10: 2

## 2024-08-07 NOTE — ED TRIAGE NOTES
Patient was sent here by urgent care for issues with pain to both feet just below the big toe.  He had xrays and was instructed to come in for an US. He also reports some discoloration.

## 2024-08-07 NOTE — ED PROVIDER NOTES
HPI   Chief Complaint   Patient presents with   • Foot pain     Patient was sent here by urgent care for issues with pain to both feet just below the big toe.  He had xrays and was instructed to come in for an US. He also reports some discoloration.       58-year-old male present emergency department the chief complaint of cyst on his right great toe left third lower extremity digit.  History of pulmonary embolism on Xarelto.  Was at urgent care had x-rays and was referred to the emergency department for ultrasound.  Patient is well-appearing nontoxic afebrile.  No known injury or trauma.  No other complaint.              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   • History of deep venous thrombosis (DVT) of distal vein of left lower extremity 10/19/2023    2014   • History of pulmonary embolism 10/19/2023    2014   • Hyperlipidemia 09/19/2023   • Other pulmonary embolism without acute cor pulmonale (Multi) 02/03/2015    Pulmonary embolism   • Personal history of diseases of the blood and blood-forming organs and certain disorders involving the immune mechanism     History of hypercoagulable state   • Personal history of other venous thrombosis and embolism     History of deep venous thrombosis   • Scoliosis 1978   • Traumatic tear of supraspinatus tendon of right shoulder     left shoulder as well     Past Surgical History:   Procedure Laterality Date   • KNEE ARTHROSCOPY W/ DEBRIDEMENT  02/02/2015    Arthroscopy Knee Left   • LIPOMA RESECTION N/A 09/2023    multiple abdominal lipoma's excised   • MR HEAD ANGIO WO IV CONTRAST  12/15/2016    MR HEAD ANGIO WO IV CONTRAST [facility]   • WISDOM TOOTH EXTRACTION  1982     Family History   Problem Relation Name Age of Onset   • Macular degeneration Mother Megha    • Osteoporosis  Mother Megha    • Transient ischemic attack Mother Megha    • Brain Aneurysm Mother Megha    • Hypertension Mother Megha    • Other (Heart valve surgery) Father Ray    • Other (RHEUMATIC HEART DISEASE) Father Ray    • Nephrolithiasis Father Ray    • Heart disease Father Ray    • Hypertension Father Ray    • Breast cancer Sister Peg 58   • Irritable bowel syndrome Sister Peg    • ROGER disease Sister Peg    • Hypertension Sister Peg    • Cancer Sister Peg    • Prostate cancer Brother     • Cancer Brother Yves Salinas    • Cancer Other GRAND FATHER      Social History     Tobacco Use   • Smoking status: Never     Passive exposure: Never   • Smokeless tobacco: Never   Vaping Use   • Vaping status: Never Used   Substance Use Topics   • Alcohol use: Yes     Alcohol/week: 2.0 standard drinks of alcohol     Types: 2 Standard drinks or equivalent per week     Comment: occasionally   • Drug use: Never       Physical Exam   ED Triage Vitals [08/07/24 1326]   Temperature Heart Rate Respirations BP   36.5 °C (97.7 °F) 69 15 (!) 152/94      Pulse Ox Temp src Heart Rate Source Patient Position   96 % -- -- --      BP Location FiO2 (%)     -- --       Physical Exam  Vitals and nursing note reviewed.   Constitutional:       Appearance: Normal appearance.   HENT:      Head: Normocephalic.      Nose: Nose normal.   Cardiovascular:      Rate and Rhythm: Normal rate.      Pulses: Normal pulses.   Pulmonary:      Effort: Pulmonary effort is normal.   Abdominal:      General: Abdomen is flat.   Musculoskeletal:      Comments: Small cystic structure at the base of the right great toe and left third upper extremity digit without surrounding erythema or edema   Skin:     General: Skin is warm.   Neurological:      General: No focal deficit present.      Mental Status: He is alert and oriented to person, place, and time.   Psychiatric:         Mood and Affect: Mood normal.           ED Course & MDM   Diagnoses as of 08/07/24 1529   Cyst of soft  tissue                 No data recorded     Wilson Coma Scale Score: 15 (08/07/24 1326 : Beata West RN)                           Medical Decision Making  I have seen and evaluated this patient.  Physician available for consultation.  Vital signs have been reviewed.  All laboratory and diagnostic imaging is reviewed by myself and interpreted by myself unless otherwise stated.  Additionally imaging is interpreted by radiologist.    Soft tissue ultrasound performed.  Most consistent with cystic structures potentially synovial cyst or ganglion cyst.  Does not represent any type of thrombosis.  Patient will continue his normal medications given podiatry referral.  Released in good condition from urgent standpoint.    Labs Reviewed - No data to display  US nonvascular extremity US extremity nonvascular real time w image documentation complete   Final Result   Complex cysts are identified corresponding with the patient's   blisters along the plantar aspect of the right 1st toe and left 3rd   toe as above.        Signed by: Panfilo Crespo 8/7/2024 3:06 PM   Dictation workstation:   [id number]        Medications - No data to display  New Prescriptions    No medications on file         Procedure  Procedures     Faisal Howard PA-C  08/07/24 1529

## 2024-08-08 ENCOUNTER — APPOINTMENT (OUTPATIENT)
Dept: PRIMARY CARE | Facility: CLINIC | Age: 59
End: 2024-08-08
Payer: COMMERCIAL

## 2024-08-19 ENCOUNTER — LAB (OUTPATIENT)
Dept: LAB | Facility: LAB | Age: 59
End: 2024-08-19
Payer: COMMERCIAL

## 2024-08-19 DIAGNOSIS — R20.2 PARESTHESIA OF SKIN: Primary | ICD-10-CM

## 2024-08-19 LAB
ALBUMIN SERPL-MCNC: 4.1 G/DL (ref 3.5–5)
ALP BLD-CCNC: 73 U/L (ref 35–125)
ALT SERPL-CCNC: 20 U/L (ref 5–40)
ANION GAP SERPL CALC-SCNC: 10 MMOL/L
AST SERPL-CCNC: 14 U/L (ref 5–40)
BASOPHILS # BLD AUTO: 0.06 X10*3/UL (ref 0–0.1)
BASOPHILS NFR BLD AUTO: 1 %
BILIRUB SERPL-MCNC: 0.5 MG/DL (ref 0.1–1.2)
BUN SERPL-MCNC: 22 MG/DL (ref 8–25)
CALCIUM SERPL-MCNC: 9.4 MG/DL (ref 8.5–10.4)
CHLORIDE SERPL-SCNC: 110 MMOL/L (ref 97–107)
CO2 SERPL-SCNC: 24 MMOL/L (ref 24–31)
CREAT SERPL-MCNC: 1 MG/DL (ref 0.4–1.6)
EGFRCR SERPLBLD CKD-EPI 2021: 87 ML/MIN/1.73M*2
EOSINOPHIL # BLD AUTO: 0.1 X10*3/UL (ref 0–0.7)
EOSINOPHIL NFR BLD AUTO: 1.6 %
ERYTHROCYTE [DISTWIDTH] IN BLOOD BY AUTOMATED COUNT: 12.7 % (ref 11.5–14.5)
ERYTHROCYTE [SEDIMENTATION RATE] IN BLOOD BY WESTERGREN METHOD: 7 MM/H (ref 0–20)
EST. AVERAGE GLUCOSE BLD GHB EST-MCNC: 123 MG/DL
GLUCOSE SERPL-MCNC: 102 MG/DL (ref 65–99)
HBA1C MFR BLD: 5.9 %
HCT VFR BLD AUTO: 45 % (ref 41–52)
HGB BLD-MCNC: 15 G/DL (ref 13.5–17.5)
IMM GRANULOCYTES # BLD AUTO: 0.05 X10*3/UL (ref 0–0.7)
IMM GRANULOCYTES NFR BLD AUTO: 0.8 % (ref 0–0.9)
LYMPHOCYTES # BLD AUTO: 1.6 X10*3/UL (ref 1.2–4.8)
LYMPHOCYTES NFR BLD AUTO: 26.2 %
MCH RBC QN AUTO: 29.5 PG (ref 26–34)
MCHC RBC AUTO-ENTMCNC: 33.3 G/DL (ref 32–36)
MCV RBC AUTO: 88 FL (ref 80–100)
MONOCYTES # BLD AUTO: 0.6 X10*3/UL (ref 0.1–1)
MONOCYTES NFR BLD AUTO: 9.8 %
NEUTROPHILS # BLD AUTO: 3.7 X10*3/UL (ref 1.2–7.7)
NEUTROPHILS NFR BLD AUTO: 60.6 %
NRBC BLD-RTO: 0 /100 WBCS (ref 0–0)
PLATELET # BLD AUTO: 264 X10*3/UL (ref 150–450)
POTASSIUM SERPL-SCNC: 4.3 MMOL/L (ref 3.4–5.1)
PROT SERPL-MCNC: 6.5 G/DL (ref 5.9–7.9)
RBC # BLD AUTO: 5.09 X10*6/UL (ref 4.5–5.9)
SODIUM SERPL-SCNC: 144 MMOL/L (ref 133–145)
TSH SERPL DL<=0.05 MIU/L-ACNC: 0.46 MIU/L (ref 0.27–4.2)
VIT B12 SERPL-MCNC: 666 PG/ML (ref 211–946)
WBC # BLD AUTO: 6.1 X10*3/UL (ref 4.4–11.3)

## 2024-08-19 PROCEDURE — 36415 COLL VENOUS BLD VENIPUNCTURE: CPT

## 2024-08-19 PROCEDURE — 82607 VITAMIN B-12: CPT

## 2024-08-19 PROCEDURE — 86334 IMMUNOFIX E-PHORESIS SERUM: CPT

## 2024-08-19 PROCEDURE — 85025 COMPLETE CBC W/AUTO DIFF WBC: CPT

## 2024-08-19 PROCEDURE — 84207 ASSAY OF VITAMIN B-6: CPT

## 2024-08-19 PROCEDURE — 83036 HEMOGLOBIN GLYCOSYLATED A1C: CPT

## 2024-08-19 PROCEDURE — 80053 COMPREHEN METABOLIC PANEL: CPT

## 2024-08-19 PROCEDURE — 84165 PROTEIN E-PHORESIS SERUM: CPT

## 2024-08-19 PROCEDURE — 86780 TREPONEMA PALLIDUM: CPT

## 2024-08-19 PROCEDURE — 84155 ASSAY OF PROTEIN SERUM: CPT

## 2024-08-19 PROCEDURE — 85652 RBC SED RATE AUTOMATED: CPT

## 2024-08-19 PROCEDURE — 84443 ASSAY THYROID STIM HORMONE: CPT

## 2024-08-20 LAB
PROT SERPL-MCNC: 6.3 G/DL (ref 6.4–8.2)
TREPONEMA PALLIDUM IGG+IGM AB [PRESENCE] IN SERUM OR PLASMA BY IMMUNOASSAY: NONREACTIVE

## 2024-08-21 ENCOUNTER — TREATMENT (OUTPATIENT)
Dept: PHYSICAL THERAPY | Facility: CLINIC | Age: 59
End: 2024-08-21
Payer: COMMERCIAL

## 2024-08-21 DIAGNOSIS — M25.519 SHOULDER PAIN, UNSPECIFIED CHRONICITY, UNSPECIFIED LATERALITY: Primary | ICD-10-CM

## 2024-08-21 PROCEDURE — 97110 THERAPEUTIC EXERCISES: CPT | Mod: GP | Performed by: PHYSICAL THERAPIST

## 2024-08-21 NOTE — PROGRESS NOTES
Physical Therapy    Physical Therapy Treatment AND RE EVAL    Patient Name: Mikey Salinas  MRN: [id number]  Today's Date: 8/21/2024         Visit #: 3 out of 12 goals  Insurance: Visit 2/13 insurance   Evaluation date: 5/30/24 July 16th pt has MD follow up     Current Problem:   1. Shoulder pain, unspecified chronicity, unspecified laterality  Follow Up In Physical Therapy          SUBJECTIVE:   Pt had MD follow up July 16 2024, no surgery at this time.  Pt was given a cortisone to his RT shoulder seems to have helped the pain.   Reaching up his back and out into abduction for a glass of water hurts  Lt shoulder seems pretty good, no pain, using it for ADL's ex wash hair without issue.    Rt shoulder 0-3, kian stiff  Tolerating hep well as long as he keeps GH isometrics sub max   past  Pt states he saw Dr Dumont and he indicted to fix it surgery would be required.  Pt was told to try PT.  Pt reports good tolerance to his program given at his IE nearly a month ago   New MRI done:   Per 6/3 note jazmine shay on 6/18  Left shoulder  Full thickness and full width tear of the supraspinatus tendon.  Acromioclavicular joint hypertrophy with mild impingement.  IMPRESSION:RT  Full thickness and near complete full width tear of the supraspinatus tendon.     Complete tear of the long head of the biceps tendon     Tear of the posterior superior labrum.     Acromioclavicular joint arthrosis with impingement.    Precautions:  blood thinners clotting disorder no DN     Scoliosis 16 deg,Grade 1 anterolisthesis L5/ S1,  bilateral pars defects,remote- Mild  compression fracture deformities of L2 and L3         Pain:   Start of session: LT 0/10, RT 0- 3/10     OBJECTIVE:    Stephen sign on RT     UE AROM:  8/21/24  SPADI-17% total, disability 19%, was 30/130 at initial eval 23% impairment  Shoulder: RT/LT  Flex 145 stiff right,165  Abd 150 eccentric lower pain /168  ER 75 shoulder blade pain, 80 LT  IR loss, stiff, Rt 4 inch less LT  T8  ER 70 deg RT  RT shoulder elevation, stiff, aarom rope and pulley  MMT strength  Shoulder: RT/LT  Flex 3-, 5  Abd 3-,5  ER infra  4, 5  IR sub scapularis 5,5  Supra 3+ RT/ 5 -LT    Past session/ June  Flex 155/160 painful arc 105 on right, aarom pain at end range with rope and pulley   Abd 155 end range pain /165  ER 75 shoulder blade pain, 80 LT  IR mild loss bilateral not painful Rt 1 inch less     Treatments:  Therapeutic Exercise: ( 38 minutes)  Shoulder rom per above    Posture  stand tall   Scapular adduction 2x10  Rope and pulley aarom RT flex/abd/POSx10   Shrug with backward roll x15  Codman's x10 EA  for/back/side, circles-raggedy heather doll RT  Supine:  - wand aarom IR/ER 2x10  -wand aarom press up 2x10  RT GH isometrics wall sub max x10/5 sec hold :flexion, IR, ER   Stand:  -Rows peach  3x10   -shoulder extension peach 3x10   PROM RT shoulder elevation ,abduction, gh oscillations     Manual Therapy: ( minutes)    Therapeutic Activity: ( minutes)     HEP:  HEP:perform all as tolerated   Access Code: [email or web address]  URL: [email or web address]  Date: 05/30/2024  Prepared by: Mariajose Quintero     Exercises  - Seated Scapular Retraction  - 2 x daily - 7 x weekly - 10 reps  - Seated Shoulder Shrug Circles AROM Backward  - 2 x daily - 7 x weekly - 10 reps  - Circular Shoulder Pendulum with Table Support  - 1 x daily - 7 x weekly - 10 reps  - Flexion-Extension Shoulder Pendulum with Table Support  - 1 x daily - 7 x weekly - 10 reps  - Horizontal Shoulder Pendulum with Table Support  - 1 x daily - 7 x weekly - 10 reps  Access Code: [email or web address] pt to perform as tolerated   URL: [email or web address]  Date: 06/26/2024  Prepared by: Mariajose Quintero    Exercises  - Standing Shoulder Row with Anchored Resistance  - 3 x weekly - 2 sets - 10 reps  - Shoulder extension with resistance - Neutral  - 3 x weekly - 2 sets - 10 reps  - Isometric Shoulder Flexion at Wall  - 3 x weekly - 10 reps - 5 hold  - Standing Isometric  Shoulder Internal Rotation at Doorway  - 3 x weekly - 10 reps - 5 hold  - Standing Isometric Shoulder External Rotation with Doorway  - 3 x weekly - 10 reps - 5 hold  - Isometric Shoulder Extension at Wall  - 3 x weekly - 10 reps - 5 hold  Can progress isometrics to 2 sets as time passes.    ASSESSMENT:   It has been nearly 2 mo since pt's last session, since he is not having surgery he wants to come to PT and treat his shoulder conservatively  Pt with improved left shoulder rom and loss of some previous RT shoulder rom.  Pain rating improved left.  Pt weaker on RT shoulder.  SPADI scores slightly improved.     All gamaliel tolerated well had to decrease sh flex isometric for comfort.  See goal achievement below  Post session pain: good      PLAN:  Update Hep as tolerated, follow pt 1x a week up to 12 sessions total   OP PT PLAN:  Treatment/Interventions: Education/Instruction , Manual Therapy  , Neuromuscular re-education , Self care/home management , Therapeutic activities , and Therapeutic exercise    PT Plan: Skilled PT   PT Frequency: 1-2 times per week  Duration:6-7  Certification Period Start Date:   Certification Period End Date:   Visits Approved: 13 at [facility] left of 20 for year   Rehab Potential: Good  Plan of Care Agreement: Patient         Goals:    STG 6  Pt will be I with HEP>>progressing   Pt will have full flexion arom RT shoulder without pain>>>NA  Pt will report 25% reduction in pain level shoulder>>A left   LTG 12  Pt will improve function via 10% reduced SPADI scores 23% impairment currently >>progress  Pt will increase RC scapular strength 1/2 to 1 full grade, to improve washing hair and reaching overhead with Rt arm.  >>NA

## 2024-08-22 LAB — PYRIDOXAL PHOS SERPL-SCNC: 118.4 NMOL/L (ref 20–125)

## 2024-08-23 ENCOUNTER — APPOINTMENT (OUTPATIENT)
Dept: INTEGRATIVE MEDICINE | Facility: CLINIC | Age: 59
End: 2024-08-23
Payer: COMMERCIAL

## 2024-08-23 LAB
ALBUMIN: 3.9 G/DL (ref 3.4–5)
ALPHA 1 GLOBULIN: 0.2 G/DL (ref 0.2–0.6)
ALPHA 2 GLOBULIN: 0.6 G/DL (ref 0.4–1.1)
BETA GLOBULIN: 1 G/DL (ref 0.5–1.2)
GAMMA GLOBULIN: 0.6 G/DL (ref 0.5–1.4)
IMMUNOFIXATION COMMENT: NORMAL
PATH REVIEW - SERUM IMMUNOFIXATION: NORMAL
PATH REVIEW-SERUM PROTEIN ELECTROPHORESIS: NORMAL
PROTEIN ELECTROPHORESIS COMMENT: NORMAL

## 2024-08-28 ENCOUNTER — APPOINTMENT (OUTPATIENT)
Dept: RADIOLOGY | Facility: HOSPITAL | Age: 59
End: 2024-08-28
Payer: COMMERCIAL

## 2024-08-28 ENCOUNTER — APPOINTMENT (OUTPATIENT)
Dept: INTEGRATIVE MEDICINE | Facility: CLINIC | Age: 59
End: 2024-08-28
Payer: COMMERCIAL

## 2024-08-28 ENCOUNTER — APPOINTMENT (OUTPATIENT)
Dept: CARDIOLOGY | Facility: HOSPITAL | Age: 59
End: 2024-08-28
Payer: COMMERCIAL

## 2024-08-28 ENCOUNTER — TREATMENT (OUTPATIENT)
Dept: PHYSICAL THERAPY | Facility: CLINIC | Age: 59
End: 2024-08-28
Payer: COMMERCIAL

## 2024-08-28 ENCOUNTER — HOSPITAL ENCOUNTER (EMERGENCY)
Facility: HOSPITAL | Age: 59
Discharge: HOME | End: 2024-08-28
Attending: EMERGENCY MEDICINE
Payer: COMMERCIAL

## 2024-08-28 VITALS
HEIGHT: 70 IN | HEART RATE: 66 BPM | WEIGHT: 216 LBS | DIASTOLIC BLOOD PRESSURE: 88 MMHG | RESPIRATION RATE: 14 BRPM | BODY MASS INDEX: 30.92 KG/M2 | SYSTOLIC BLOOD PRESSURE: 133 MMHG | TEMPERATURE: 98 F | OXYGEN SATURATION: 97 %

## 2024-08-28 DIAGNOSIS — R51.9 ACUTE NONINTRACTABLE HEADACHE, UNSPECIFIED HEADACHE TYPE: ICD-10-CM

## 2024-08-28 DIAGNOSIS — I10 DIASTOLIC HYPERTENSION: ICD-10-CM

## 2024-08-28 DIAGNOSIS — M25.519 SHOULDER PAIN, UNSPECIFIED CHRONICITY, UNSPECIFIED LATERALITY: ICD-10-CM

## 2024-08-28 DIAGNOSIS — K63.8219 SMALL INTESTINAL BACTERIAL OVERGROWTH (SIBO): Primary | ICD-10-CM

## 2024-08-28 DIAGNOSIS — R00.2 PALPITATIONS: Primary | ICD-10-CM

## 2024-08-28 LAB
ALBUMIN SERPL-MCNC: 4.1 G/DL (ref 3.5–5)
ALP BLD-CCNC: 68 U/L (ref 35–125)
ALT SERPL-CCNC: 20 U/L (ref 5–40)
ANION GAP SERPL CALC-SCNC: 10 MMOL/L
AST SERPL-CCNC: 17 U/L (ref 5–40)
BASOPHILS # BLD AUTO: 0.08 X10*3/UL (ref 0–0.1)
BASOPHILS NFR BLD AUTO: 1.1 %
BILIRUB SERPL-MCNC: 0.4 MG/DL (ref 0.1–1.2)
BUN SERPL-MCNC: 18 MG/DL (ref 8–25)
CALCIUM SERPL-MCNC: 9.3 MG/DL (ref 8.5–10.4)
CHLORIDE SERPL-SCNC: 108 MMOL/L (ref 97–107)
CO2 SERPL-SCNC: 25 MMOL/L (ref 24–31)
CREAT SERPL-MCNC: 1 MG/DL (ref 0.4–1.6)
EGFRCR SERPLBLD CKD-EPI 2021: 87 ML/MIN/1.73M*2
EOSINOPHIL # BLD AUTO: 0.1 X10*3/UL (ref 0–0.7)
EOSINOPHIL NFR BLD AUTO: 1.4 %
ERYTHROCYTE [DISTWIDTH] IN BLOOD BY AUTOMATED COUNT: 12.8 % (ref 11.5–14.5)
GLUCOSE SERPL-MCNC: 94 MG/DL (ref 65–99)
HCT VFR BLD AUTO: 46 % (ref 41–52)
HGB BLD-MCNC: 15.2 G/DL (ref 13.5–17.5)
HOLD SPECIMEN: NORMAL
IMM GRANULOCYTES # BLD AUTO: 0.05 X10*3/UL (ref 0–0.7)
IMM GRANULOCYTES NFR BLD AUTO: 0.7 % (ref 0–0.9)
LYMPHOCYTES # BLD AUTO: 2.06 X10*3/UL (ref 1.2–4.8)
LYMPHOCYTES NFR BLD AUTO: 28.1 %
MAGNESIUM SERPL-MCNC: 2.4 MG/DL (ref 1.6–3.1)
MCH RBC QN AUTO: 29.3 PG (ref 26–34)
MCHC RBC AUTO-ENTMCNC: 33 G/DL (ref 32–36)
MCV RBC AUTO: 89 FL (ref 80–100)
MONOCYTES # BLD AUTO: 0.63 X10*3/UL (ref 0.1–1)
MONOCYTES NFR BLD AUTO: 8.6 %
NEUTROPHILS # BLD AUTO: 4.42 X10*3/UL (ref 1.2–7.7)
NEUTROPHILS NFR BLD AUTO: 60.1 %
NRBC BLD-RTO: 0 /100 WBCS (ref 0–0)
NT-PROBNP SERPL-MCNC: 72 PG/ML (ref 0–177)
PLATELET # BLD AUTO: 242 X10*3/UL (ref 150–450)
POTASSIUM SERPL-SCNC: 4.1 MMOL/L (ref 3.4–5.1)
PROT SERPL-MCNC: 6.5 G/DL (ref 5.9–7.9)
RBC # BLD AUTO: 5.18 X10*6/UL (ref 4.5–5.9)
SODIUM SERPL-SCNC: 143 MMOL/L (ref 133–145)
TROPONIN T SERPL-MCNC: 6 NG/L
TROPONIN T SERPL-MCNC: <6 NG/L
TSH SERPL DL<=0.05 MIU/L-ACNC: 0.57 MIU/L (ref 0.27–4.2)
WBC # BLD AUTO: 7.3 X10*3/UL (ref 4.4–11.3)

## 2024-08-28 PROCEDURE — 36415 COLL VENOUS BLD VENIPUNCTURE: CPT | Performed by: NURSE PRACTITIONER

## 2024-08-28 PROCEDURE — 70450 CT HEAD/BRAIN W/O DYE: CPT

## 2024-08-28 PROCEDURE — 97110 THERAPEUTIC EXERCISES: CPT | Mod: GP | Performed by: PHYSICAL THERAPIST

## 2024-08-28 PROCEDURE — 96360 HYDRATION IV INFUSION INIT: CPT | Mod: 59

## 2024-08-28 PROCEDURE — 97811 ACUP 1/> W/O ESTIM EA ADD 15: CPT | Performed by: NATUROPATH

## 2024-08-28 PROCEDURE — 71275 CT ANGIOGRAPHY CHEST: CPT | Performed by: STUDENT IN AN ORGANIZED HEALTH CARE EDUCATION/TRAINING PROGRAM

## 2024-08-28 PROCEDURE — 99285 EMERGENCY DEPT VISIT HI MDM: CPT | Mod: 25

## 2024-08-28 PROCEDURE — 85025 COMPLETE CBC W/AUTO DIFF WBC: CPT | Performed by: NURSE PRACTITIONER

## 2024-08-28 PROCEDURE — 80053 COMPREHEN METABOLIC PANEL: CPT | Performed by: NURSE PRACTITIONER

## 2024-08-28 PROCEDURE — 2550000001 HC RX 255 CONTRASTS: Performed by: EMERGENCY MEDICINE

## 2024-08-28 PROCEDURE — 70450 CT HEAD/BRAIN W/O DYE: CPT | Performed by: STUDENT IN AN ORGANIZED HEALTH CARE EDUCATION/TRAINING PROGRAM

## 2024-08-28 PROCEDURE — 83735 ASSAY OF MAGNESIUM: CPT | Performed by: NURSE PRACTITIONER

## 2024-08-28 PROCEDURE — 84484 ASSAY OF TROPONIN QUANT: CPT | Performed by: NURSE PRACTITIONER

## 2024-08-28 PROCEDURE — 97810 ACUP 1/> WO ESTIM 1ST 15 MIN: CPT | Performed by: NATUROPATH

## 2024-08-28 PROCEDURE — 93005 ELECTROCARDIOGRAM TRACING: CPT

## 2024-08-28 PROCEDURE — 71275 CT ANGIOGRAPHY CHEST: CPT

## 2024-08-28 PROCEDURE — 2500000004 HC RX 250 GENERAL PHARMACY W/ HCPCS (ALT 636 FOR OP/ED): Performed by: EMERGENCY MEDICINE

## 2024-08-28 PROCEDURE — 84443 ASSAY THYROID STIM HORMONE: CPT | Performed by: NURSE PRACTITIONER

## 2024-08-28 PROCEDURE — 83880 ASSAY OF NATRIURETIC PEPTIDE: CPT | Performed by: NURSE PRACTITIONER

## 2024-08-28 RX ORDER — ACETAMINOPHEN 325 MG/1
650 TABLET ORAL ONCE
Status: COMPLETED | OUTPATIENT
Start: 2024-08-28 | End: 2024-08-28

## 2024-08-28 RX ORDER — IBUPROFEN 200 MG
1 TABLET ORAL ONCE
Status: DISCONTINUED | OUTPATIENT
Start: 2024-08-28 | End: 2024-08-28

## 2024-08-28 ASSESSMENT — LIFESTYLE VARIABLES
HAVE PEOPLE ANNOYED YOU BY CRITICIZING YOUR DRINKING: NO
EVER FELT BAD OR GUILTY ABOUT YOUR DRINKING: NO
TOTAL SCORE: 0
EVER HAD A DRINK FIRST THING IN THE MORNING TO STEADY YOUR NERVES TO GET RID OF A HANGOVER: NO
HAVE YOU EVER FELT YOU SHOULD CUT DOWN ON YOUR DRINKING: NO

## 2024-08-28 ASSESSMENT — PAIN SCALES - GENERAL
PAINLEVEL_OUTOF10: 0 - NO PAIN
PAINLEVEL_OUTOF10: 2
PAINLEVEL_OUTOF10: 0 - NO PAIN

## 2024-08-28 ASSESSMENT — PAIN - FUNCTIONAL ASSESSMENT
PAIN_FUNCTIONAL_ASSESSMENT: 0-10
PAIN_FUNCTIONAL_ASSESSMENT: 0-10

## 2024-08-28 ASSESSMENT — PAIN DESCRIPTION - LOCATION: LOCATION: HEAD

## 2024-08-28 NOTE — Clinical Note
Mikey Salinas was seen and treated in our emergency department on 8/28/2024.  He may return to work on 08/29/2024.       If you have any questions or concerns, please don't hesitate to call.      Irma Grimaldo MD

## 2024-08-28 NOTE — PROGRESS NOTES
Physical Therapy    Physical Therapy Treatment     Patient Name: Mikey Salinas  MRN: [id number]  Today's Date: 8/28/2024    Time Calculation  Start Time: 0746  Stop Time: 0824  Time Calculation (min): 38 min    Visit #: 4 out of 12 goals  Insurance: Visit 2/13 insurance   Evaluation date: 5/30/24 July 16th pt has MD follow up     Current Problem:   1. Shoulder pain, unspecified chronicity, unspecified laterality  Follow Up In Physical Therapy          SUBJECTIVE:   Pt reports some mild soreness post return with gamaliel but recovered, twinges RT arm more so. Pt feels the cortisone shot may have worn off RT shoulder  Past   Pt had MD follow up July 16 2024, no surgery at this time.  Pt was given a cortisone to his RT shoulder seems to have helped the pain.   Reaching up his back and out into abduction for a glass of water hurts  Lt shoulder seems pretty good, no pain, using it for ADL's ex wash hair without issue.    Rt shoulder 0-3, kian stiff  Tolerating hep well as long as he keeps GH isometrics sub max   past  Pt states he saw Dr Dumont and he indicted to fix it surgery would be required.  Pt was told to try PT.  Pt reports good tolerance to his program given at his IE nearly a month ago   New MRI done:   Per 6/3 note jazmine shay on 6/18  Left shoulder  Full thickness and full width tear of the supraspinatus tendon.  Acromioclavicular joint hypertrophy with mild impingement.  IMPRESSION:RT  Full thickness and near complete full width tear of the supraspinatus tendon.     Complete tear of the long head of the biceps tendon     Tear of the posterior superior labrum.     Acromioclavicular joint arthrosis with impingement.    Precautions:  blood thinners clotting disorder no DN     Scoliosis 16 deg,Grade 1 anterolisthesis L5/ S1,  bilateral pars defects,remote- Mild  compression fracture deformities of L2 and L3         Pain:   Start of session: LT 0/10, RT 1- 3/10 past week     OBJECTIVE:    Stephen sign on RT     UE  AROM:  RT flexion aarom 165    8/21/24  SPADI-17% total, disability 19%, was 30/130 at initial eval 23% impairment  Shoulder: RT/LT  Flex 145 stiff right,165  Abd 150 eccentric lower pain /168  ER 75 shoulder blade pain, 80 LT  IR loss, stiff, Rt 4 inch less LT T8  ER 70 deg RT  RT shoulder elevation, stiff, aarom rope and pulley  MMT strength  Shoulder: RT/LT  Flex 3-, 5  Abd 3-,5  ER infra  4, 5  IR sub scapularis 5,5  Supra 3+ RT/ 5 -LT    Past session/ June  Flex 155/160 painful arc 105 on right, aarom pain at end range with rope and pulley   Abd 155 end range pain /165  ER 75 shoulder blade pain, 80 LT  IR mild loss bilateral not painful Rt 1 inch less     Treatments:  Therapeutic Exercise: ( 38  minutes)no latex allergies  Shoulder rom per above    Posture  stand tall   Scapular adduction 2x10  Rope and pulley aarom RT flex/abd/POS x15   Shrug with backward roll 2x10  Codman's x10 EA  for/back/side, circles-RT  Supine:  - wand aarom IR/ER 2x10  -wand aarom press up 2x12  -wand elevation raises from hook x10  RT GH isometrics wall sub max x10/5 sec hold :flexion, IR, ER DNP  Stand:  -Rows orange  3x10   -shoulder extension orange 3x10   _-left: IR/ER 2x10 peach   Right 1 step-walk out/in IR/ER isometric peach band x10    Supine:PROM RT shoulder elevation ,abduction, horiz abdgh oscillations     Manual Therapy: ( minutes)    Therapeutic Activity: ( minutes)     HEP:  HEP:perform all as tolerated   Access Code: [email or web address]  URL: [email or web address]  Date: 05/30/2024  Prepared by: Mariajose Quintero     Exercises  - Seated Scapular Retraction  - 2 x daily - 7 x weekly - 10 reps  - Seated Shoulder Shrug Circles AROM Backward  - 2 x daily - 7 x weekly - 10 reps  - Circular Shoulder Pendulum with Table Support  - 1 x daily - 7 x weekly - 10 reps  - Flexion-Extension Shoulder Pendulum with Table Support  - 1 x daily - 7 x weekly - 10 reps  - Horizontal Shoulder Pendulum with Table Support  - 1 x daily - 7 x weekly -  10 reps  Access Code: [email or web address] pt to perform as tolerated   URL: [email or web address]  Date: 06/26/2024  Prepared by: Mariajose Quintero    Exercises  - Standing Shoulder Row with Anchored Resistance  - 3 x weekly - 2 sets - 10 reps  - Shoulder extension with resistance - Neutral  - 3 x weekly - 2 sets - 10 reps  - Isometric Shoulder Flexion at Wall  - 3 x weekly - 10 reps - 5 hold  - Standing Isometric Shoulder Internal Rotation at Doorway  - 3 x weekly - 10 reps - 5 hold  - Standing Isometric Shoulder External Rotation with Doorway  - 3 x weekly - 10 reps - 5 hold  - Isometric Shoulder Extension at Wall  - 3 x weekly - 10 reps - 5 hold  Can progress isometrics to 2 sets as time passes.    ASSESSMENT:    Pt weaker on RT shoulder.    Progressed cuff strengthening with good tolerance .     RT arm can reach across chest better now to wash he couldn't do prior   See goal achievement below  Post session pain: good      PLAN:  Update Hep as tolerated, follow pt 1x a week up to 12 sessions total   OP PT PLAN:  Treatment/Interventions: Education/Instruction , Manual Therapy  , Neuromuscular re-education , Self care/home management , Therapeutic activities , and Therapeutic exercise    PT Plan: Skilled PT   PT Frequency: 1-2 times per week  Duration:6-7  Certification Period Start Date:   Certification Period End Date:   Visits Approved: 13 at [facility] left of 20 for year   Rehab Potential: Good  Plan of Care Agreement: Patient         Goals:    STG 6  Pt will be I with HEP>>progressing   Pt will have full flexion arom RT shoulder without pain>>>NA  Pt will report 25% reduction in pain level shoulder>>A left   LTG 12  Pt will improve function via 10% reduced SPADI scores 23% impairment currently >>progress  Pt will increase RC scapular strength 1/2 to 1 full grade, to improve washing hair and reaching overhead with Rt arm.  >>NA

## 2024-08-28 NOTE — PROGRESS NOTES
Acupuncture Visit:     Subjective   Patient ID: Mikey Salinas is a 58 y.o. male who presents for No chief complaint on file.  GI sx returning  Feels heavines in abdomen  BM were soft recently, but has returned  No N/V chills  Shoulders- rotator cuff tears, cortisone in right. PT since June  Has cysts on feet, seeing [facility]  Sleep- still on and off, waking 3-4 x a night          Digestion still improved,   Some tightness upper abdomen, but kows it could be scliosis related  Sleep still poor, woke 330am today  No palps recently  Still not started medications.   Working with spine specialist for scoliosis.                             Review of Systems         Provider reviewed plan for the acupuncture session, precautions and contraindications. Patient/guardian/hospital staff has given consent to treat with full understanding of what to expect during the session. Before acupuncture began, provider explained to the patient to communicate at any time if the procedure was causing discomfort past their tolerance level. Patient agreed to advise acupuncturist. The acupuncturist counseled the patient on the risks of acupuncture treatment including pain, infection, bleeding, and no relief of pain. The patient was positioned comfortably. There was no evidence of infection at the site of needle insertions.    Objective   Physical Exam       Acupuncture Treatment  Body Points - Bilateral: Du 20,, LI11, SJ7, LI4, ST25, Srinath 12SP9, St36, SP6, KI7, LR3, SP3  Auricular Points: Yes  Auricular Points - Bilateral: ear ramirez men, pt 0  Other Techniques Utilized: TDP Lamp  TDP Lamp Descripton: abdomen  Needle Count In: 26  Needle Count Out: 26  Needle Retention Time (min): 25 minutes  Total Face to Face Time (min): 25 minutes                      Assessment/Plan   Diagnoses and all orders for this visit:  Small intestinal bacterial overgrowth (SIBO) (Primary)

## 2024-08-29 ENCOUNTER — TELEPHONE (OUTPATIENT)
Dept: PRIMARY CARE | Facility: CLINIC | Age: 59
End: 2024-08-29
Payer: COMMERCIAL

## 2024-08-29 NOTE — TELEPHONE ENCOUNTER
Added to next available slot 9/10 at 145pm - also added him to the waitlist he will get a text if something sooner opens up for him to be seen. Please let patient know appt date/time.

## 2024-08-29 NOTE — TELEPHONE ENCOUNTER
PT calling stating that he was seen in [facility] ER 8/28/2024 seen for tachycardia-PT was told to follow up with PCP within 2 days. Please advise.

## 2024-08-29 NOTE — DISCHARGE INSTRUCTIONS
Follow-up with your primary care physician within 1 to 2 days for further management of your current symptoms and repeat check of your blood pressure.    Return to the emergency department sooner with worsening of symptoms or onset of new symptoms

## 2024-08-29 NOTE — ED PROVIDER NOTES
HPI   Chief Complaint   Patient presents with    Palpitations     Pt had covid at home 3+ weeks ago. Since resolved. Pt reports feelings of unprovoked heart fluttering intermittently. Also complains of minor head ache.       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    History of deep venous thrombosis (DVT) of distal vein of left lower extremity 10/19/2023    2014    History of pulmonary embolism 10/19/2023    2014    Hyperlipidemia 09/19/2023    Other pulmonary embolism without acute cor pulmonale (Multi) 02/03/2015    Pulmonary embolism    Personal history of diseases of the blood and blood-forming organs and certain disorders involving the immune mechanism     History of hypercoagulable state    Personal history of other venous thrombosis and embolism     History of deep venous thrombosis    Scoliosis 1978    Traumatic tear of supraspinatus tendon of right shoulder     left shoulder as well     Past Surgical History:   Procedure Laterality Date    KNEE ARTHROSCOPY W/ DEBRIDEMENT  02/02/2015    Arthroscopy Knee Left    LIPOMA RESECTION N/A 09/2023    multiple abdominal lipoma's excised    MR HEAD ANGIO WO IV CONTRAST  12/15/2016    MR HEAD ANGIO WO IV CONTRAST [facility]    WISDOM TOOTH EXTRACTION  1982     Family History   Problem Relation Name Age of Onset    Macular degeneration Mother Megha     Osteoporosis Mother Megha     Transient ischemic attack Mother Megha     Brain Aneurysm Mother Megha     Hypertension Mother Megha     Other (Heart valve surgery) Father Ray     Other (RHEUMATIC HEART DISEASE) Father Ray     Nephrolithiasis Father Ray     Heart disease Father Ray     Hypertension Father Ray     Breast cancer Sister Peg 58    Irritable bowel syndrome Sister Peg     ROGER disease Sister Peg     Hypertension  Sister Peg     Cancer Sister Peg     Prostate cancer Brother      Cancer Brother Yves Salinas     Cancer Other GRAND FATHER      Social History     Tobacco Use    Smoking status: Never     Passive exposure: Never    Smokeless tobacco: Never   Vaping Use    Vaping status: Never Used   Substance Use Topics    Alcohol use: Yes     Alcohol/week: 2.0 standard drinks of alcohol     Types: 2 Standard drinks or equivalent per week     Comment: occasionally    Drug use: Never       Physical Exam   ED Triage Vitals [08/28/24 1942]   Temperature Heart Rate Respirations BP   36.7 °C (98 °F) 76 16 (!) 144/102      Pulse Ox Temp Source Heart Rate Source Patient Position   95 % Oral Monitor Sitting      BP Location FiO2 (%)     Left arm --       Physical Exam      ED Course & MDM   ED Course as of 08/28/24 2348   Wed Aug 28, 2024   1941 The EKG was interpreted by me independently and showed normal sinus rhythm at 72 bpm, normal axis, normal voltage, normal ST segment, and a flattening of the T waves in the inferior leads [NG]      ED Course User Index  [NG] Irma Grimaldo MD         Diagnoses as of 08/28/24 2348   Palpitations   Acute nonintractable headache, unspecified headache type   Diastolic hypertension                 No data recorded     [place] Coma Scale Score: 15 (08/28/24 2100 : Johanna Elizondo RN)                           Medical Decision Making    The patient is a 58-year-old male presenting to the emergency department for evaluation of palpitations, malaise and fatigue, and a diffuse headache.  The patient states that he tested positive for COVID-19 about 3 weeks ago.  He has not been able to get an appointment with his primary care physician.  He states that he did seem to improve somewhat but then over the past 2 to 3 days he has developed a diffuse headache and palpitations.  He feels like his heart is racing at times.  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  He states that the headache  is behind his eyes and in the back of his head.  No better or worse.  No radiation.  Dull aching pain.  He denies any chest pain.  No significant shortness of breath.  No fever or chills.  No cough or congestion at this time.  No abdominal pain.  No nausea vomiting.  No diarrhea or constipation but no urinary complaints.  All pertinent positives and negatives are recorded above.  All other systems reviewed and otherwise negative.  Vital signs with diastolic hypertension but otherwise within normal limits.  Physical exam with a well-nourished well-developed male in no acute distress.  HEENT exam within normal limits.  He has no evidence of airway compromise or respiratory distress.  Abdominal exam is benign.  He does not have any gross motor, neurologic or vascular deficits on exam.        EKG with sinus rhythm at 72 bpm, normal axis, normal voltage, normal ST segment, and a slight flattening of the T waves in the inferior leads      Oral acetaminophen and IV fluids ordered.      Diagnostic labs with mild electrolyte imbalance but otherwise unremarkable.      Initial troponin T < 6.  Repeat trop T 6      CT angio chest for pulmonary embolism   Final Result   No acute pulmonary embolism or other acute cardiopulmonary process.        Redemonstration of an intraluminal linear web in the right lower lobe   segmental pulmonary artery consistent with remote pulmonary embolism.        MACRO:   None.        Signed by: Shaquille Ramirez 8/28/2024 11:04 PM   Dictation workstation:   [id number]      CT head wo IV contrast   Final Result   No acute intracranial abnormality.             MACRO:   None.        Signed by: Shaquille Ramirez 8/28/2024 10:57 PM   Dictation workstation:   [id number]           The patient does not have any evidence of ischemia on EKG or cardiac enzymes.  No events on telemetry.  The patient does not have any evidence of airway compromise or respiratory distress on exam.  He is well-perfused and no  evidence of sepsis.  Diagnostic labs with a mild electrolyte imbalance but otherwise unremarkable.  CT head shows no evidence of intracranial hemorrhage and/or mass effect.  No evidence of CVA.  He does not have any gross motor, neurologic or vascular deficits on exam.  NIH stroke scale score of 0.  tPA is not indicated given this.  The CT chest shows no evidence of PE or dissection.  No evidence of pneumonia or pneumothorax.  No evidence of CHF.      The patient was released in good condition.  He will follow-up with his primary care physician within 1 to 2 days for further management of his current symptoms and repeat check of his blood pressure.  He was instructed to return to the emergency department sooner with worsening of symptoms or onset of new symptoms.        Impression/diagnosis  Palpitations  Diastolic hypertension  Headache, diffuse      I independently reviewed the results of the EKG and diagnostic labs      I reviewed the results of the diagnostic labs and diagnostic imaging.  Formal radiology reading was completed by the radiologist    Procedure  Procedures     Irma Grimaldo MD  08/28/24 3697       Irma Grimaldo MD  08/28/24 [id number]

## 2024-08-29 NOTE — TELEPHONE ENCOUNTER
PCP scheduled is booked today, is off tomorrow, and not back in office until next Thursday. PCP scheduled is booked out

## 2024-09-03 ENCOUNTER — APPOINTMENT (OUTPATIENT)
Dept: CARDIOLOGY | Facility: CLINIC | Age: 59
End: 2024-09-03
Payer: COMMERCIAL

## 2024-09-03 ENCOUNTER — ANCILLARY PROCEDURE (OUTPATIENT)
Dept: CARDIOLOGY | Facility: CLINIC | Age: 59
End: 2024-09-03
Payer: COMMERCIAL

## 2024-09-03 VITALS
TEMPERATURE: 98 F | HEIGHT: 70 IN | DIASTOLIC BLOOD PRESSURE: 82 MMHG | HEART RATE: 85 BPM | WEIGHT: 218 LBS | SYSTOLIC BLOOD PRESSURE: 129 MMHG | BODY MASS INDEX: 31.21 KG/M2 | RESPIRATION RATE: 18 BRPM | OXYGEN SATURATION: 97 %

## 2024-09-03 DIAGNOSIS — E78.2 MIXED HYPERLIPIDEMIA: ICD-10-CM

## 2024-09-03 DIAGNOSIS — R00.2 PALPITATIONS: ICD-10-CM

## 2024-09-03 DIAGNOSIS — R07.2 PRECORDIAL PAIN: ICD-10-CM

## 2024-09-03 DIAGNOSIS — R07.89 TIGHT CHEST: Primary | ICD-10-CM

## 2024-09-03 LAB — BODY SURFACE AREA: 2.21 M2

## 2024-09-03 PROCEDURE — 3008F BODY MASS INDEX DOCD: CPT | Performed by: INTERNAL MEDICINE

## 2024-09-03 PROCEDURE — 1036F TOBACCO NON-USER: CPT | Performed by: INTERNAL MEDICINE

## 2024-09-03 PROCEDURE — 99214 OFFICE O/P EST MOD 30 MIN: CPT | Performed by: INTERNAL MEDICINE

## 2024-09-03 RX ORDER — METOPROLOL SUCCINATE 25 MG/1
25 TABLET, EXTENDED RELEASE ORAL DAILY
Qty: 30 TABLET | Refills: 11 | Status: SHIPPED | OUTPATIENT
Start: 2024-09-03 | End: 2025-09-03

## 2024-09-03 RX ORDER — CHLORDIAZEPOXIDE HYDROCHLORIDE AND CLIDINIUM BROMIDE 5; 2.5 MG/1; MG/1
4 CAPSULE ORAL
COMMUNITY
Start: 2024-08-30

## 2024-09-03 ASSESSMENT — LIFESTYLE VARIABLES
AUDIT TOTAL SCORE: 1
HOW MANY STANDARD DRINKS CONTAINING ALCOHOL DO YOU HAVE ON A TYPICAL DAY: 1 OR 2
HOW OFTEN DO YOU HAVE A DRINK CONTAINING ALCOHOL: MONTHLY OR LESS
AUDIT-C TOTAL SCORE: 1
HOW OFTEN DO YOU HAVE SIX OR MORE DRINKS ON ONE OCCASION: NEVER
HAVE YOU OR SOMEONE ELSE BEEN INJURED AS A RESULT OF YOUR DRINKING: NO
SKIP TO QUESTIONS 9-10: 1
HAS A RELATIVE, FRIEND, DOCTOR, OR ANOTHER HEALTH PROFESSIONAL EXPRESSED CONCERN ABOUT YOUR DRINKING OR SUGGESTED YOU CUT DOWN: NO

## 2024-09-03 ASSESSMENT — ENCOUNTER SYMPTOMS
LOSS OF SENSATION IN FEET: 0
DEPRESSION: 0
OCCASIONAL FEELINGS OF UNSTEADINESS: 0

## 2024-09-03 ASSESSMENT — PATIENT HEALTH QUESTIONNAIRE - PHQ9
2. FEELING DOWN, DEPRESSED OR HOPELESS: NOT AT ALL
SUM OF ALL RESPONSES TO PHQ9 QUESTIONS 1 AND 2: 0
1. LITTLE INTEREST OR PLEASURE IN DOING THINGS: NOT AT ALL

## 2024-09-03 ASSESSMENT — PAIN SCALES - GENERAL: PAINLEVEL: 0-NO PAIN

## 2024-09-03 NOTE — PROGRESS NOTES
History of present illness:  58 year old male patient here today following up on hx of PE a few years ago . Patient on life long oral anticoagulation.  Here in my office for follow-up.  Patient had recent hospitalization in the ER for palpitations.  For the last few weeks has been having episodes where he feels his heart rate running around 100 210 bpm.  Went to the emergency room few weeks ago.  Reviewed all his events in the emergency room EKG was within normal limit.  Troponin negative x 3.  Patient underwent CT angio of the chest that showed no pulmonary embolism.  He still taking his Xarelto.  At this point patient denies having chest pain still having palpitations denies any shortness of breath no lower extremity edema orthopnea or PND.  No dizziness or syncope.    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    History of deep venous thrombosis (DVT) of distal vein of left lower extremity 10/19/2023    2014    History of pulmonary embolism 10/19/2023    2014    Hyperlipidemia 09/19/2023    Other pulmonary embolism without acute cor pulmonale (Multi) 02/03/2015    Pulmonary embolism    Personal history of diseases of the blood and blood-forming organs and certain disorders involving the immune mechanism     History of hypercoagulable state    Personal history of other venous thrombosis and embolism     History of deep venous thrombosis    Scoliosis 1978    Traumatic tear of supraspinatus tendon of right shoulder     left shoulder as well       Past Surgical History:   Procedure Laterality Date    KNEE ARTHROSCOPY W/ DEBRIDEMENT  02/02/2015    Arthroscopy Knee Left    LIPOMA RESECTION N/A 09/2023    multiple abdominal lipoma's excised    MR HEAD ANGIO WO IV CONTRAST  12/15/2016    MR HEAD ANGIO WO IV CONTRAST [facility]    WISDOM TOOTH EXTRACTION   1982       Allergies   Allergen Reactions    Chocolate Flavor GI Upset    Cocoa Nausea/vomiting    Penicillins Rash        reports that he has never smoked. He has never been exposed to tobacco smoke. He has never used smokeless tobacco. He reports current alcohol use of about 2.0 standard drinks of alcohol per week. He reports that he does not use drugs.    Family History   Problem Relation Name Age of Onset    Macular degeneration Mother Megha     Osteoporosis Mother Megha     Transient ischemic attack Mother Megha     Brain Aneurysm Mother Megha     Hypertension Mother Megha     Other (Heart valve surgery) Father Ray     Other (RHEUMATIC HEART DISEASE) Father Ray     Nephrolithiasis Father Ray     Heart disease Father Ray     Hypertension Father Ray     Breast cancer Sister Peg 58    Irritable bowel syndrome Sister Peg     ROGER disease Sister Peg     Hypertension Sister Peg     Cancer Sister Peg     Prostate cancer Brother      Cancer Brother Yves Salinas     Cancer Other GRAND FATHER        Patient's Medications   New Prescriptions    No medications on file   Previous Medications    ACETAMINOPHEN (TYLENOL) 325 MG TABLET    Take by mouth.    CHLORDIAZEPOXIDE-CLIDINIUM (LIBRAX) 5-2.5 MG CAPSULE    Take 4 capsules by mouth 4 times a day before meals.    DICLOFENAC SODIUM (VOLTAREN) 1 % GEL GEL    Apply topically.    FLUTICASONE (FLONASE ALLERGY RELIEF) 50 MCG/ACTUATION NASAL SPRAY    Administer 1 spray into each nostril once daily.    GABAPENTIN (NEURONTIN) 100 MG CAPSULE    Take 1 capsule (100 mg) by mouth 3 times a day as needed (pain/paresthesia).    HYDROXYZINE HCL (ATARAX) 25 MG TABLET    Take 1 tablet (25 mg) by mouth every 8 hours if needed (anxiety or insomnia).    L.ACID/L.CASEI/B.BIF/B.LIZZY/FOS (PROBIOTIC BLEND ORAL)    Take by mouth.    RIVAROXABAN (XARELTO) 20 MG TABLET    Take 1 tablet (20 mg) by mouth once daily.   Modified Medications    No medications on file   Discontinued Medications    No medications  on file       Objective   Physical Exam  General: Patient in no acute distress   HEENT: Atraumatic normocephalic.  Neck: Supple, jugular venous pressure within normal limit.  No bruits  Lungs: Clear to auscultation bilaterally  Cardiovascular: Regular rate and rhythm, normal heart sounds, no murmurs rubs or gallops  Abdomen: Soft nontender nondistended.  Normal bowel sounds.  Extremities: Warm to touch, no edema.      Lab Review   Admission on 08/28/2024, Discharged on 08/28/2024   Component Date Value    Glucose 08/28/2024 94     Sodium 08/28/2024 143     Potassium 08/28/2024 4.1     Chloride 08/28/2024 108 (H)     Bicarbonate 08/28/2024 25     Urea Nitrogen 08/28/2024 18     Creatinine 08/28/2024 1.00     eGFR 08/28/2024 87     Calcium 08/28/2024 9.3     Albumin 08/28/2024 4.1     Alkaline Phosphatase 08/28/2024 68     Total Protein 08/28/2024 6.5     AST 08/28/2024 17     Bilirubin, Total 08/28/2024 0.4     ALT 08/28/2024 20     Anion Gap 08/28/2024 10     WBC 08/28/2024 7.3     nRBC 08/28/2024 0.0     RBC 08/28/2024 5.18     Hemoglobin 08/28/2024 15.2     Hematocrit 08/28/2024 46.0     MCV 08/28/2024 89     MCH 08/28/2024 29.3     MCHC 08/28/2024 33.0     RDW 08/28/2024 12.8     Platelets 08/28/2024 242     Neutrophils % 08/28/2024 60.1     Immature Granulocytes %,* 08/28/2024 0.7     Lymphocytes % 08/28/2024 28.1     Monocytes % 08/28/2024 8.6     Eosinophils % 08/28/2024 1.4     Basophils % 08/28/2024 1.1     Neutrophils Absolute 08/28/2024 4.42     Immature Granulocytes Ab* 08/28/2024 0.05     Lymphocytes Absolute 08/28/2024 2.06     Monocytes Absolute 08/28/2024 0.63     Eosinophils Absolute 08/28/2024 0.10     Basophils Absolute 08/28/2024 0.08     PROBNP 08/28/2024 72     Magnesium 08/28/2024 2.40     Thyroid Stimulating Horm* 08/28/2024 0.57     Troponin T, High Sensiti* 08/28/2024 <6     Troponin T, High Sensiti* 08/28/2024 6     Extra Tube 08/28/2024 Hold for add-ons.    Lab on 08/19/2024   Component  Date Value    Thyroid Stimulating Horm* 08/19/2024 0.46     Sedimentation Rate 08/19/2024 7     Syphilis Total Ab 08/19/2024 Nonreactive     Glucose 08/19/2024 102 (H)     Sodium 08/19/2024 144     Potassium 08/19/2024 4.3     Chloride 08/19/2024 110 (H)     Bicarbonate 08/19/2024 24     Urea Nitrogen 08/19/2024 22     Creatinine 08/19/2024 1.00     eGFR 08/19/2024 87     Calcium 08/19/2024 9.4     Albumin 08/19/2024 4.1     Alkaline Phosphatase 08/19/2024 73     Total Protein 08/19/2024 6.5     AST 08/19/2024 14     Bilirubin, Total 08/19/2024 0.5     ALT 08/19/2024 20     Anion Gap 08/19/2024 10     Vitamin B12 08/19/2024 666     Vitamin B6 08/19/2024 118.4     WBC 08/19/2024 6.1     nRBC 08/19/2024 0.0     RBC 08/19/2024 5.09     Hemoglobin 08/19/2024 15.0     Hematocrit 08/19/2024 45.0     MCV 08/19/2024 88     MCH 08/19/2024 29.5     MCHC 08/19/2024 33.3     RDW 08/19/2024 12.7     Platelets 08/19/2024 264     Neutrophils % 08/19/2024 60.6     Immature Granulocytes %,* 08/19/2024 0.8     Lymphocytes % 08/19/2024 26.2     Monocytes % 08/19/2024 9.8     Eosinophils % 08/19/2024 1.6     Basophils % 08/19/2024 1.0     Neutrophils Absolute 08/19/2024 3.70     Immature Granulocytes Ab* 08/19/2024 0.05     Lymphocytes Absolute 08/19/2024 1.60     Monocytes Absolute 08/19/2024 0.60     Eosinophils Absolute 08/19/2024 0.10     Basophils Absolute 08/19/2024 0.06     Hemoglobin A1C 08/19/2024 5.9 (H)     Estimated Average Glucose 08/19/2024 123     Total Protein 08/19/2024 6.3 (L)     Albumin 08/19/2024 3.9     Alpha 1 Globulin 08/19/2024 0.2     Alpha 2 Globulin 08/19/2024 0.6     Beta Globulin 08/19/2024 1.0     Gamma 08/19/2024 0.6     Protein Electrophoresis * 08/19/2024 Normal.     Immunofixation Comment 08/19/2024 No monoclonal protein detected by immunofixation.     Path Review - Serum Prot* 08/19/2024 Reviewed and approved by HUY SANDERS on 8/23/24 at 1:09 PM.         Path Review - Serum Immu* 08/19/2024  Reviewed and approved by HUY SANDERS on 8/23/24 at 1:09 PM.        Lab on 07/10/2024   Component Date Value    Testosterone, Free 07/10/2024 49.3     Testosterone, Total, LC-* 07/10/2024 421         Assessment/Plan   Patient Active Problem List   Diagnosis    Anxiety    Factor II deficiency (Multi)    Hyperlipidemia    Lipoma    Obesity    Pityriasis rosea    Scoliosis    Tension type headache    History of pulmonary embolism    History of deep venous thrombosis (DVT) of distal vein of left lower extremity    Age-related nuclear cataract of both eyes    Gastroesophageal reflux disease with hiatal hernia    Chronic bilateral low back pain    Shoulder pain    Pain in joint of right shoulder    Arthrosis of right acromioclavicular joint    Biceps strain, right, subsequent encounter    Rotator cuff strain, right, subsequent encounter    Sprain of right shoulder, subsequent encounter    Tear of right supraspinatus tendon    Tear of biceps tendon    Tear of right glenoid labrum    Tear of left supraspinatus tendon    Impingement of left shoulder      58 year old male patient here today following up on hx of PE a few years ago . Patient on life long oral anticoagulation.  Here in my office for follow-up.  Patient had recent hospitalization in the ER for palpitations.  For the last few weeks has been having episodes where he feels his heart rate running around 100 210 bpm.  Went to the emergency room few weeks ago.  Reviewed all his events in the emergency room EKG was within normal limit.  Troponin negative x 3.  Patient underwent CT angio of the chest that showed no pulmonary embolism.  He still taking his Xarelto.  At this point patient denies having chest pain still having palpitations denies any shortness of breath no lower extremity edema orthopnea or PND.  No dizziness or syncope.  My recommendation is to arrange for monitor for 2 weeks to rule out any major arrhythmias.  We may consider doing echocardiogram down  the road since he have not had any echocardiogram done in many years.  Otherwise he stable from my standpoint we will start him on metoprolol succinate 25 mg oral daily for palpitations arrhythmia will follow-up in 4 months.      Rodo Kaur MD

## 2024-09-04 ENCOUNTER — TREATMENT (OUTPATIENT)
Dept: PHYSICAL THERAPY | Facility: CLINIC | Age: 59
End: 2024-09-04
Payer: COMMERCIAL

## 2024-09-04 DIAGNOSIS — M25.519 SHOULDER PAIN, UNSPECIFIED CHRONICITY, UNSPECIFIED LATERALITY: ICD-10-CM

## 2024-09-04 PROCEDURE — 97110 THERAPEUTIC EXERCISES: CPT | Mod: GP | Performed by: PHYSICAL THERAPIST

## 2024-09-04 NOTE — PROGRESS NOTES
Physical Therapy    Physical Therapy Treatment     Patient Name: Mikey Salinas  MRN: [id number]  Today's Date: 9/4/2024    Time Calculation  Start Time: 0830  Stop Time: 0911  Time Calculation (min): 41 min    Visit #: 5 out of 12 goals  Insurance: Visit 2/13 insurance   Evaluation date: 5/30/24 July 16th pt has MD follow up     Current Problem:   1. Shoulder pain, unspecified chronicity, unspecified laterality  Follow Up In Physical Therapy          SUBJECTIVE:   Pt reports good tolerance to his last session  RT shoulder 2/10, left 0/10  Past   Pt had MD follow up July 16 2024, no surgery at this time.  Pt was given a cortisone to his RT shoulder seems to have helped the pain.   Reaching up his back and out into abduction for a glass of water hurts  Lt shoulder seems pretty good, no pain, using it for ADL's ex wash hair without issue.    Rt shoulder 0-3, kian stiff  Tolerating hep well as long as he keeps GH isometrics sub max   past  Pt states he saw Dr Dumont and he indicted to fix it surgery would be required.  Pt was told to try PT.  Pt reports good tolerance to his program given at his IE nearly a month ago   New MRI done:   Per 6/3 note jazmine hsay on 6/18  Left shoulder  Full thickness and full width tear of the supraspinatus tendon.  Acromioclavicular joint hypertrophy with mild impingement.  IMPRESSION:RT  Full thickness and near complete full width tear of the supraspinatus tendon.     Complete tear of the long head of the biceps tendon     Tear of the posterior superior labrum.     Acromioclavicular joint arthrosis with impingement.    Precautions:  blood thinners clotting disorder no DN     Scoliosis 16 deg,Grade 1 anterolisthesis L5/ S1,  bilateral pars defects,remote- Mild  compression fracture deformities of L2 and L3         Pain:   Start of session: LT 0/10, RT 2/10 past week     OBJECTIVE:    Stephen sign on RT     UE AROM:  Rt flexion 150 tight   RT flexion aarom 165    8/21/24  SPADI-17% total,  disability 19%, was 30/130 at initial eval 23% impairment  Shoulder: RT/LT  Flex 145 stiff right,165  Abd 150 eccentric lower pain /168  ER 75 shoulder blade pain, 80 LT  IR loss, stiff, Rt 4 inch less LT T8  ER 70 deg RT  RT shoulder elevation, stiff, aarom rope and pulley  MMT strength  Shoulder: RT/LT  Flex 3-, 5  Abd 3-,5  ER infra  4, 5  IR sub scapularis 5,5  Supra 3+ RT/ 5 -LT    Past session/ June  Flex 155/160 painful arc 105 on right, aarom pain at end range with rope and pulley   Abd 155 end range pain /165  ER 75 shoulder blade pain, 80 LT  IR mild loss bilateral not painful Rt 1 inch less     Treatments:  Therapeutic Exercise: ( 38  minutes)no latex allergies  Shoulder rom per above    Posture  stand tall   Scapular adduction 2x12  Rope and pulley aarom RT flex/abd/POS x15   Shrug with backward roll 2x12  Codman's x10 EA  for/back/side, circles-RT  Supine:  - wand aarom IR/ER 2x10  -wand aarom press up 2x15  -wand elevation raises from hook  x15  RT GH isometrics wall sub max x10/5 sec hold :flexion, IR, ER DNP  Stand:  -Rows green   3x10   -shoulder extension green  3x10   _-left: IR/ER 3x10 peach   RT peach ER 3x10  Right 1 step-walk out/in IR/ER isometric peach band x10    Supine:PROM RT shoulder elevation ,abduction, horiz abduction, gh oscillations     Manual Therapy: ( minutes)    Therapeutic Activity: ( minutes)     HEP:  HEP:perform all as tolerated   Access Code: [email or web address]  URL: [email or web address]  Date: 05/30/2024  Prepared by: Mariajose Quintero     Exercises  - Seated Scapular Retraction  - 2 x daily - 7 x weekly - 10 reps  - Seated Shoulder Shrug Circles AROM Backward  - 2 x daily - 7 x weekly - 10 reps  - Circular Shoulder Pendulum with Table Support  - 1 x daily - 7 x weekly - 10 reps  - Flexion-Extension Shoulder Pendulum with Table Support  - 1 x daily - 7 x weekly - 10 reps  - Horizontal Shoulder Pendulum with Table Support  - 1 x daily - 7 x weekly - 10 reps  Access Code: [email or web address]  pt to perform as tolerated   URL: [email or web address]  Date: 06/26/2024  Prepared by: Mariajose Quintero    Exercises  - Standing Shoulder Row with Anchored Resistance  - 3 x weekly - 2 sets - 10 reps  - Shoulder extension with resistance - Neutral  - 3 x weekly - 2 sets - 10 reps  - Isometric Shoulder Flexion at Wall  - 3 x weekly - 10 reps - 5 hold  - Standing Isometric Shoulder Internal Rotation at Doorway  - 3 x weekly - 10 reps - 5 hold  - Standing Isometric Shoulder External Rotation with Doorway  - 3 x weekly - 10 reps - 5 hold  - Isometric Shoulder Extension at Wall  - 3 x weekly - 10 reps - 5 hold  Can progress isometrics to 2 sets as time passes.    ASSESSMENT:    Pt weaker on RT shoulder.    Progressed cuff strengthening with good tolerance .     Some sx with horiz add    See goal achievement below  Post session pain: good      PLAN:  Update Hep as tolerated, follow pt 1x a week up to 12 sessions total   OP PT PLAN:  Treatment/Interventions: Education/Instruction , Manual Therapy  , Neuromuscular re-education , Self care/home management , Therapeutic activities , and Therapeutic exercise    PT Plan: Skilled PT   PT Frequency: 1-2 times per week  Duration:6-7  Certification Period Start Date:   Certification Period End Date:   Visits Approved: 13 at eval left of 20 for year   Rehab Potential: Good  Plan of Care Agreement: Patient         Goals:    STG 6  Pt will be I with HEP>>progressing   Pt will have full flexion arom RT shoulder without pain>>>NA  Pt will report 25% reduction in pain level shoulder>>A left   LTG 12  Pt will improve function via 10% reduced SPADI scores 23% impairment currently >>progress  Pt will increase RC scapular strength 1/2 to 1 full grade, to improve washing hair and reaching overhead with Rt arm.  >>NA

## 2024-09-10 ENCOUNTER — OFFICE VISIT (OUTPATIENT)
Dept: PRIMARY CARE | Facility: CLINIC | Age: 59
End: 2024-09-10
Payer: COMMERCIAL

## 2024-09-10 ENCOUNTER — TREATMENT (OUTPATIENT)
Dept: PHYSICAL THERAPY | Facility: CLINIC | Age: 59
End: 2024-09-10
Payer: COMMERCIAL

## 2024-09-10 VITALS
HEIGHT: 70 IN | TEMPERATURE: 97.5 F | DIASTOLIC BLOOD PRESSURE: 84 MMHG | BODY MASS INDEX: 32.16 KG/M2 | WEIGHT: 224.6 LBS | OXYGEN SATURATION: 94 % | HEART RATE: 80 BPM | SYSTOLIC BLOOD PRESSURE: 126 MMHG

## 2024-09-10 DIAGNOSIS — E78.5 HYPERLIPIDEMIA, UNSPECIFIED HYPERLIPIDEMIA TYPE: ICD-10-CM

## 2024-09-10 DIAGNOSIS — M25.519 SHOULDER PAIN, UNSPECIFIED CHRONICITY, UNSPECIFIED LATERALITY: ICD-10-CM

## 2024-09-10 DIAGNOSIS — Z12.5 SCREENING PSA (PROSTATE SPECIFIC ANTIGEN): Primary | ICD-10-CM

## 2024-09-10 PROCEDURE — 3008F BODY MASS INDEX DOCD: CPT | Performed by: FAMILY MEDICINE

## 2024-09-10 PROCEDURE — 97110 THERAPEUTIC EXERCISES: CPT | Mod: GP | Performed by: PHYSICAL THERAPIST

## 2024-09-10 PROCEDURE — 99214 OFFICE O/P EST MOD 30 MIN: CPT | Performed by: FAMILY MEDICINE

## 2024-09-10 ASSESSMENT — PATIENT HEALTH QUESTIONNAIRE - PHQ9
SUM OF ALL RESPONSES TO PHQ9 QUESTIONS 1 AND 2: 0
1. LITTLE INTEREST OR PLEASURE IN DOING THINGS: NOT AT ALL
2. FEELING DOWN, DEPRESSED OR HOPELESS: NOT AT ALL

## 2024-09-10 ASSESSMENT — PAIN SCALES - GENERAL: PAINLEVEL: 0-NO PAIN

## 2024-09-10 ASSESSMENT — LIFESTYLE VARIABLES: HOW MANY STANDARD DRINKS CONTAINING ALCOHOL DO YOU HAVE ON A TYPICAL DAY: PATIENT DOES NOT DRINK

## 2024-09-10 NOTE — PROGRESS NOTES
Physical Therapy    Physical Therapy Treatment     Patient Name: Mikey Salinas  MRN: [id number]  Today's Date: 9/10/2024    Time Calculation  Start Time: 0830  Stop Time: 0909  Time Calculation (min): 39 min    Visit #: 6 out of 12 goals  Insurance: Visit 2/13 insurance   Evaluation date: 5/30/24 July 16th pt has MD follow up     Current Problem:   1. Shoulder pain, unspecified chronicity, unspecified laterality  Follow Up In Physical Therapy          SUBJECTIVE:   Pt reports good tolerance to his last session, some soreness resolved in day  RT shoulder 1/10, left 0/10  Past   Pt had MD follow up July 16 2024, no surgery at this time.  Pt was given a cortisone to his RT shoulder seems to have helped the pain.   Reaching up his back and out into abduction for a glass of water hurts  Lt shoulder seems pretty good, no pain, using it for ADL's ex wash hair without issue.    Rt shoulder 0-3, kian stiff  Tolerating hep well as long as he keeps GH isometrics sub max   past  Pt states he saw Dr Dumont and he indicted to fix it surgery would be required.  Pt was told to try PT.  New MRI done:   Per 6/3 note jazmine shay on 6/18  Left shoulder  Full thickness and full width tear of the supraspinatus tendon.  Acromioclavicular joint hypertrophy with mild impingement.  IMPRESSION:RT  Full thickness and near complete full width tear of the supraspinatus tendon.     Complete tear of the long head of the biceps tendon     Tear of the posterior superior labrum.     Acromioclavicular joint arthrosis with impingement.    Precautions:  blood thinners clotting disorder no DN     Scoliosis 16 deg,Grade 1 anterolisthesis L5/ S1,  bilateral pars defects,remote- Mild  compression fracture deformities of L2 and L3         Pain:   Start of session: LT 0/10, RT 2/10 past week     OBJECTIVE:    Stephen sign on RT     UE AROM:  Rt flexion 150 tight   RT flexion aarom 165  40 ER painful RT    8/21/24  SPADI-17% total, disability 19%, was 30/130 at  initial eval 23% impairment  Shoulder: RT/LT  Flex 145 stiff right,165  Abd 150 eccentric lower pain /168  ER 75 shoulder blade pain, 80 LT  IR loss, stiff, Rt 4 inch less LT T8  ER 70 deg RT  RT shoulder elevation, stiff, aarom rope and pulley  MMT strength  Shoulder: RT/LT  Flex 3-, 5  Abd 3-,5  ER infra  4, 5  IR sub scapularis 5,5  Supra 3+ RT/ 5 -LT    Past session/ June  Flex 155/160 painful arc 105 on right, aarom pain at end range with rope and pulley   Abd 155 end range pain /165  ER 75 shoulder blade pain, 80 LT  IR mild loss bilateral not painful Rt 1 inch less     Treatments:  Therapeutic Exercise: ( 39  minutes)no latex allergies  Shoulder rom per above    Posture  stand tall   Scapular adduction 2x13  Rope and pulley aarom RT flex/abd/POS x15   Shrug with backward roll 2x13  Codman's x10 EA  for/back/side, circles-RT  Supine:  - wand aarom IR/ER 2x12  -wand aarom press up 2x15  -wand elevation raises from hook 2x10  -shoulder flexion 0-90 with bumper  RT GH isometrics wall sub max x10/5 sec hold :flexion  Stand:  -Rows green   3x12  -shoulder extension green  3x12   _-left: IR/ER 3x10 orange   RT peach ER 3x10  RT IR peach 3x10    Supine:PROM RT shoulder elevation ,abduction, horiz abduction, gh oscillations     Manual Therapy: ( minutes)    Therapeutic Activity: ( minutes)     HEP:  HEP:perform all as tolerated   Access Code: [email or web address]  URL: [email or web address]  Date: 05/30/2024  Prepared by: Mariajose Quintero     Exercises  - Seated Scapular Retraction  - 2 x daily - 7 x weekly - 10 reps  - Seated Shoulder Shrug Circles AROM Backward  - 2 x daily - 7 x weekly - 10 reps  - Circular Shoulder Pendulum with Table Support  - 1 x daily - 7 x weekly - 10 reps  - Flexion-Extension Shoulder Pendulum with Table Support  - 1 x daily - 7 x weekly - 10 reps  - Horizontal Shoulder Pendulum with Table Support  - 1 x daily - 7 x weekly - 10 reps  Access Code: [email or web address] pt to perform as tolerated   URL:  [email or web address]  Date: 06/26/2024  Prepared by: Mariajose Quintero    Exercises  - Standing Shoulder Row with Anchored Resistance  - 3 x weekly - 2 sets - 10 reps  - Shoulder extension with resistance - Neutral  - 3 x weekly - 2 sets - 10 reps  - Isometric Shoulder Flexion at Wall  - 3 x weekly - 10 reps - 5 hold  - Standing Isometric Shoulder Internal Rotation at Doorway  - 3 x weekly - 10 reps - 5 hold  - Standing Isometric Shoulder External Rotation with Doorway  - 3 x weekly - 10 reps - 5 hold  - Isometric Shoulder Extension at Wall  - 3 x weekly - 10 reps - 5 hold  Can progress isometrics to 2 sets as time passes.    ASSESSMENT:    Pt weaker on RT shoulder.    Progressed cuff strengthening with good tolerance isotonics today RT     See goal achievement below  Post session pain: good , no worse      PLAN:  Update Hep as tolerated, follow pt 1x a week up to 12 sessions total   OP PT PLAN:  Treatment/Interventions: Education/Instruction , Manual Therapy  , Neuromuscular re-education , Self care/home management , Therapeutic activities , and Therapeutic exercise    PT Plan: Skilled PT   PT Frequency: 1-2 times per week  Duration:6-7  Certification Period Start Date:   Certification Period End Date:   Visits Approved: 13 at eval left of 20 for year   Rehab Potential: Good  Plan of Care Agreement: Patient         Goals:    STG 6  Pt will be I with HEP>>progressing   Pt will have full flexion arom RT shoulder without pain>>>NA  Pt will report 25% reduction in pain level shoulder>>A left   LTG 12  Pt will improve function via 10% reduced SPADI scores 23% impairment currently >>progress  Pt will increase RC scapular strength 1/2 to 1 full grade, to improve washing hair and reaching overhead with Rt arm.  >>NA

## 2024-09-10 NOTE — PROGRESS NOTES
History Of Present Illness  Mikey Salinas is a 58 y.o. male presenting for Follow-up (ER Follow up on palpitations which pt states he has since seen his cardiologist which he was placed on an heart monitor for two weeks; just wanted to see pcp as well.)  .    Was in the ER on 8/28/2024 for palpitations.  Your evaluation notable for blood pressure of 144/102 otherwise cardiac evaluation was unremarkable.  CT head nonacute.  Labs were normal.  Was discharged to home.  Had follow-up with Dr. Kovacs on 9/3/2024.  The plan is for a heart monitor for 2 weeks and starting metoprolol daily, which she is only taking if  needed         Past Medical History  Patient Active Problem List    Diagnosis Date Noted    Pain in joint of right shoulder 06/03/2024    Arthrosis of right acromioclavicular joint 06/03/2024    Biceps strain, right, subsequent encounter 06/03/2024    Rotator cuff strain, right, subsequent encounter 06/03/2024    Sprain of right shoulder, subsequent encounter 06/03/2024    Tear of right supraspinatus tendon 06/03/2024    Tear of biceps tendon 06/03/2024    Tear of right glenoid labrum 06/03/2024    Tear of left supraspinatus tendon 06/03/2024    Impingement of left shoulder 06/03/2024    Shoulder pain 05/30/2024    Chronic bilateral low back pain 12/19/2023    Gastroesophageal reflux disease with hiatal hernia 12/04/2023    History of pulmonary embolism 10/19/2023    History of deep venous thrombosis (DVT) of distal vein of left lower extremity 10/19/2023    Anxiety 09/19/2023    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      chlordiazepoxide-clidinium  (Librax) 5-2.5 mg capsule Take 4 capsules by mouth 4 times a day before meals.      diclofenac sodium (Voltaren) 1 % gel gel Apply topically.      fluticasone (Flonase Allergy Relief) 50 mcg/actuation nasal spray Administer 1 spray into each nostril once daily.      hydrOXYzine HCL (Atarax) 25 mg tablet Take 1 tablet (25 mg) by mouth every 8 hours if needed (anxiety or insomnia). 60 tablet 0    L.acid/L.casei/B.bif/B.kenroy/FOS (PROBIOTIC BLEND ORAL) Take by mouth.      rivaroxaban (Xarelto) 20 mg tablet Take 1 tablet (20 mg) by mouth once daily. 90 tablet 1    metoprolol succinate XL (Toprol-XL) 25 mg 24 hr tablet Take 1 tablet (25 mg) by mouth once daily. Do not crush or chew. (Patient not taking: Reported on 9/10/2024) 30 tablet 11     No current facility-administered medications for this visit.        Surgical History  He has a past surgical history that includes Knee arthroscopy w/ debridement (02/02/2015); MR angio head wo IV contrast (12/15/2016); Lipoma resection (N/A, 09/2023); and [place] tooth extraction (1982).     Social History  He reports that he has never smoked. He has never been exposed to tobacco smoke. He has never used smokeless tobacco. He reports current alcohol use of about 1.0 standard drink of alcohol per week. He reports that he does not use drugs.    Family History  Family History   Problem Relation Name Age of Onset    Macular degeneration Mother Megha     Osteoporosis Mother Megha     Transient ischemic attack Mother Megha     Brain Aneurysm Mother Megha     Hypertension Mother Megha     Other (Heart valve surgery) Father Ray     Other (RHEUMATIC HEART DISEASE) Father Ray     Nephrolithiasis Father Ray     Heart disease Father Ray     Hypertension Father Ray     Breast cancer Sister Peg 58    Irritable bowel syndrome Sister Peg     ROGER disease Sister Peg     Hypertension Sister Peg     Cancer Sister Peg     Prostate cancer Brother      Cancer Brother Yves Salinas     Cancer Other GRAND FATHER   "       Allergies  Chocolate flavor, Cocoa, and Penicillins    Immunizations  Immunization History   Administered Date(s) Administered    Flu vaccine (IIV4), preservative free *Check age/dose* 09/25/2018, 10/19/2023    Flu vaccine, quadrivalent, no egg protein, age 6 month or greater (FLUCELVAX) 10/11/2021, 10/13/2022    Hepatitis A vaccine, age 19 years and greater (HAVRIX) 06/08/2022    Hepatitis A vaccine, pediatric/adolescent (HAVRIX, VAQTA) 07/03/2009, 08/03/2009    Hepatitis B vaccine, 19 yrs and under (RECOMBIVAX, ENGERIX) 07/03/2009, 08/03/2009    Influenza, injectable, MDCK, quadrivalent 11/01/2017, 10/10/2018, 12/09/2019    Influenza, seasonal, injectable 07/03/2009, 09/21/2016    MMR vaccine, subcutaneous (MMR II) 07/03/2009    Pfizer COVID-19 vaccine, bivalent, age 12 years and older (30 mcg/0.3 mL) 12/17/2022    Pfizer Gray Cap SARS-CoV-2 06/15/2022    Pfizer Purple Cap SARS-CoV-2 03/24/2021, 04/14/2021, 12/13/2021    Poliovirus vaccine, subcutaneous (IPOL) 07/03/2009    Tdap vaccine, age 7 year and older (BOOSTRIX, ADACEL) 07/03/2009, 01/30/2018, 06/08/2022    Typhoid, Parenteral 07/01/2009    Typhoid, ViCPs 06/08/2022    Zoster vaccine, recombinant, adult (SHINGRIX) 10/15/2021, 02/24/2022        ROS  Negative, except as discussed in HPI     Vitals  /84   Pulse 80   Temp 36.4 °C (97.5 °F)   Ht 1.778 m (5' 10\")   Wt 102 kg (224 lb 9.6 oz)   SpO2 94%   BMI 32.23 kg/m²      Physical Exam  Vitals and nursing note reviewed.   Constitutional:       General: He is not in acute distress.     Appearance: Normal appearance.   Cardiovascular:      Rate and Rhythm: Normal rate and regular rhythm.      Heart sounds: Normal heart sounds.   Pulmonary:      Effort: No respiratory distress.      Breath sounds: Normal breath sounds.   Neurological:      General: No focal deficit present.      Mental Status: He is alert. Mental status is at baseline.         Relevant Results  Lab Results   Component Value Date "    WBC 7.3 08/28/2024    WBC 6.1 08/19/2024    HGB 15.2 08/28/2024    HGB 15.0 08/19/2024    HCT 46.0 08/28/2024    HCT 45.0 08/19/2024    MCV 89 08/28/2024    MCV 88 08/19/2024     08/28/2024     08/19/2024     Lab Results   Component Value Date     08/28/2024     08/19/2024    K 4.1 08/28/2024    K 4.3 08/19/2024     (H) 08/28/2024     (H) 08/19/2024    CO2 25 08/28/2024    CO2 24 08/19/2024    BUN 18 08/28/2024    BUN 22 08/19/2024    CREATININE 1.00 08/28/2024    CREATININE 1.00 08/19/2024    CALCIUM 9.3 08/28/2024    CALCIUM 9.4 08/19/2024    PROT 6.5 08/28/2024    PROT 6.5 08/19/2024    PROT 6.3 (L) 08/19/2024    BILITOT 0.4 08/28/2024    BILITOT 0.5 08/19/2024    ALKPHOS 68 08/28/2024    ALKPHOS 73 08/19/2024    ALT 20 08/28/2024    ALT 20 08/19/2024    AST 17 08/28/2024    AST 14 08/19/2024    GLUCOSE 94 08/28/2024    GLUCOSE 102 (H) 08/19/2024     Lab Results   Component Value Date    HGBA1C 5.9 (H) 08/19/2024     Lab Results   Component Value Date    TSH 0.57 08/28/2024      Lab Results   Component Value Date    CHOL 175 09/18/2023    TRIG 67 09/18/2023    HDL 57 09/18/2023           Assessment/Plan   Mikey was seen today for follow-up.  Diagnoses and all orders for this visit:  Screening PSA (prostate specific antigen) (Primary)  -     Prostate Specific Antigen; Future  Hyperlipidemia, unspecified hyperlipidemia type  -     Lipid Panel; Future         Counseling:   Medication education:   -Education:  The patient is counseled regarding potential side-effects of any and all new medications  -Understanding:  Patient expressed understanding of information discussed today  -Adherence:  No barriers to adherence identified    Final treatment plan is a result of shared decision making with patient.         Felipe Salcedo MD

## 2024-09-24 ENCOUNTER — TREATMENT (OUTPATIENT)
Dept: PHYSICAL THERAPY | Facility: CLINIC | Age: 59
End: 2024-09-24
Payer: COMMERCIAL

## 2024-09-24 DIAGNOSIS — M25.519 SHOULDER PAIN, UNSPECIFIED CHRONICITY, UNSPECIFIED LATERALITY: ICD-10-CM

## 2024-09-24 PROCEDURE — 97110 THERAPEUTIC EXERCISES: CPT | Mod: GP | Performed by: PHYSICAL THERAPIST

## 2024-09-24 NOTE — PROGRESS NOTES
Physical Therapy    Physical Therapy Treatment     Patient Name: Mikey Salinas  MRN: [id number]  Today's Date: 9/24/2024    Time Calculation  Start Time: 0830  Stop Time: 0912  Time Calculation (min): 42 min    Visit #: 7 out of 12 goals  Insurance: Visit 2/13 insurance   Evaluation date: 5/30/24 July 16th pt has MD follow up     Current Problem:   1. Shoulder pain, unspecified chronicity, unspecified laterality  Follow Up In Physical Therapy          SUBJECTIVE:   To see neurology due to possible CTS or reaction to steroids as hands and feet burned but a lot less now. Hands feel dry. EMG and neck MRI pending  Pt reports good tolerance to his last session, seemed good  Active over weekend 470 mile motor cycle ride so soreness increased  RT shoulder 4/10, left 0/10  Past   Pt had MD follow up July 16 2024, no surgery at this time.  Pt was given a cortisone to his RT shoulder seems to have helped the pain.   Reaching up his back and out into abduction for a glass of water hurts  Lt shoulder seems pretty good, no pain, using it for ADL's ex wash hair without issue.    Rt shoulder 0-3, kian stiff  Tolerating hep well as long as he keeps GH isometrics sub max   past   MRI :   Per 6/3 note jazmine shay on 6/18  Left shoulder  Full thickness and full width tear of the supraspinatus tendon.  Acromioclavicular joint hypertrophy with mild impingement.  IMPRESSION:RT  Full thickness and near complete full width tear of the supraspinatus tendon.     Complete tear of the long head of the biceps tendon     Tear of the posterior superior labrum.     Acromioclavicular joint arthrosis with impingement.    Precautions:  blood thinners clotting disorder no DN     Scoliosis 16 deg,Grade 1 anterolisthesis L5/ S1,  bilateral pars defects,remote- Mild  compression fracture deformities of L2 and L3         Pain:   Start of session: LT 0/10, RT 4/10 past week     OBJECTIVE:    Stephen sign on RT     UE AROM:  Rt flexion 147 pain   RT flexion  aarom 165  40 ER painful RT    8/21/24  SPADI-17% total, disability 19%, was 30/130 at initial eval 23% impairment  Shoulder: RT/LT  Flex 145 stiff right,165  Abd 150 eccentric lower pain /168  ER 75 shoulder blade pain, 80 LT  IR loss, stiff, Rt 4 inch less LT T8  ER 70 deg RT  RT shoulder elevation, stiff, aarom rope and pulley  MMT strength  Shoulder: RT/LT  Flex 3-, 5  Abd 3-,5  ER infra  4, 5  IR sub scapularis 5,5  Supra 3+ RT/ 5 -LT    Treatments:  Therapeutic Exercise: ( 42 minutes)no latex allergies  Shoulder rom per above    Posture  stand tall   Scapular adduction 2x10  Rope and pulley aarom RT flex/abd/POS x15   Shrug with backward roll 2x10  Codman's x10 EA  for/back/side, circles-RT  Supine:  - wand aarom IR/ER 2x12  -wand aarom press up 2x15  -DA wand elevation raises from hook 2x12  -shoulder flexion 0-90 with bumper RT 2x12  -r/s at 90 deg RT x10 all directions  RT GH isometrics wall sub max x10/5 sec hold :flexion  Stand:  -Rows green   3x12 hep, dept 48# 3x10   -shoulder extension green  3x12   _-left: IR/ER 3x10 orange   RT orange ER 2x10  RT IR peach 3x10  Horizontal  abduction  partial range peach band 2x12    Supine:PROM RT shoulder elevation ,abduction, horiz abduction, gh oscillations DNP today    Manual Therapy: ( minutes)    Therapeutic Activity: ( minutes)     HEP:  HEP:perform all as tolerated   Access Code: [email or web address]  URL: [email or web address]  Date: 05/30/2024  Prepared by: Mariajose Quintero     Exercises  - Seated Scapular Retraction  - 2 x daily - 7 x weekly - 10 reps  - Seated Shoulder Shrug Circles AROM Backward  - 2 x daily - 7 x weekly - 10 reps  - Circular Shoulder Pendulum with Table Support  - 1 x daily - 7 x weekly - 10 reps  - Flexion-Extension Shoulder Pendulum with Table Support  - 1 x daily - 7 x weekly - 10 reps  - Horizontal Shoulder Pendulum with Table Support  - 1 x daily - 7 x weekly - 10 reps  Access Code: [email or web address] pt to perform as tolerated   URL:  [email or web address]  Date: 06/26/2024  Prepared by: Mariajose Quintero    Exercises  - Standing Shoulder Row with Anchored Resistance  - 3 x weekly - 2 sets - 10 reps  - Shoulder extension with resistance - Neutral  - 3 x weekly - 2 sets - 10 reps  - Isometric Shoulder Flexion at Wall  - 3 x weekly - 10 reps - 5 hold  - Standing Isometric Shoulder Internal Rotation at Doorway  - 3 x weekly - 10 reps - 5 hold  - Standing Isometric Shoulder External Rotation with Doorway  - 3 x weekly - 10 reps - 5 hold  - Isometric Shoulder Extension at Wall  - 3 x weekly - 10 reps - 5 hold  Can progress isometrics to 2 sets as time passes.    ASSESSMENT:   Pt was gone from PT about 2 weeks so minimal progressions today and sx greater with riding his motor cycle. Added RS at 90 to help with using arm for ADLs   Pt weaker on RT shoulder and more painful.      Progression with goals but slow rosie on RT  See goal achievement below  Post session pain: better stretched out     PLAN:  Update Hep as tolerated-update handouts and EMR, follow pt 1x a week up to 12 sessions total   OP PT PLAN:  Treatment/Interventions: Education/Instruction , Manual Therapy  , Neuromuscular re-education , Self care/home management , Therapeutic activities , and Therapeutic exercise    PT Plan: Skilled PT   PT Frequency: 1-2 times per week  Duration:6-7  Certification Period Start Date:   Certification Period End Date:   Visits Approved: 13 at eval left of 20 for year   Rehab Potential: Good  Plan of Care Agreement: Patient         Goals:    STG 6  Pt will be I with HEP>>met  Pt will have full flexion arom RT shoulder without pain>>>NA   Pt will report 25% reduction in pain level shoulder>>A left   LTG 12  Pt will improve function via 10% reduced SPADI scores 23% impairment currently >>progress  Pt will increase RC scapular strength 1/2 to 1 full grade, to improve washing hair and reaching overhead with Rt arm.  >>NA

## 2024-10-02 ENCOUNTER — HOSPITAL ENCOUNTER (OUTPATIENT)
Dept: RADIOLOGY | Facility: HOSPITAL | Age: 59
Discharge: HOME | End: 2024-10-02
Payer: COMMERCIAL

## 2024-10-02 ENCOUNTER — APPOINTMENT (OUTPATIENT)
Dept: INTEGRATIVE MEDICINE | Facility: CLINIC | Age: 59
End: 2024-10-02
Payer: COMMERCIAL

## 2024-10-02 DIAGNOSIS — M54.2 CERVICALGIA: ICD-10-CM

## 2024-10-02 PROCEDURE — 72141 MRI NECK SPINE W/O DYE: CPT

## 2024-10-02 PROCEDURE — 72141 MRI NECK SPINE W/O DYE: CPT | Performed by: RADIOLOGY

## 2024-10-04 ENCOUNTER — APPOINTMENT (OUTPATIENT)
Dept: ORTHOPEDIC SURGERY | Facility: CLINIC | Age: 59
End: 2024-10-04
Payer: COMMERCIAL

## 2024-10-04 DIAGNOSIS — M25.511 RIGHT SHOULDER PAIN, UNSPECIFIED CHRONICITY: Primary | ICD-10-CM

## 2024-10-04 DIAGNOSIS — M75.101 TEAR OF RIGHT SUPRASPINATUS TENDON: ICD-10-CM

## 2024-10-04 PROCEDURE — 20610 DRAIN/INJ JOINT/BURSA W/O US: CPT | Performed by: STUDENT IN AN ORGANIZED HEALTH CARE EDUCATION/TRAINING PROGRAM

## 2024-10-04 PROCEDURE — 99214 OFFICE O/P EST MOD 30 MIN: CPT | Performed by: STUDENT IN AN ORGANIZED HEALTH CARE EDUCATION/TRAINING PROGRAM

## 2024-10-04 RX ORDER — TRIAMCINOLONE ACETONIDE 40 MG/ML
40 INJECTION, SUSPENSION INTRA-ARTICULAR; INTRAMUSCULAR
Status: COMPLETED | OUTPATIENT
Start: 2024-10-04 | End: 2024-10-04

## 2024-10-04 RX ORDER — LIDOCAINE HYDROCHLORIDE 10 MG/ML
4 INJECTION, SOLUTION INFILTRATION; PERINEURAL
Status: COMPLETED | OUTPATIENT
Start: 2024-10-04 | End: 2024-10-04

## 2024-10-04 NOTE — PROGRESS NOTES
Mikey Salinas is a 58 y.o. year-old  male  he returns regarding his shoulders.  His left shoulder is actually doing fine not really having pain right side is still quite painful he continues with physical therapy.  We had given him an injection roughly 3 months ago and that helped considerably he is leaving for another trip to [place] in 2 weeks to go moose hunting and like to consider another 1 prior to then his pain is returned      Past Medical, Family, and Social History reviewed     Review of Systems  A complete review of systems was conducted, pertinent only to the HPI noted above.    Physical Exam  GEN: Alert and Oriented x 3  Constitutional: Well appearing, in no apparent distress.  Eyes: sclera anicteric  ENT: hearing appropriate for normal conversation, neck appears symmetric with no gross thyromegaly  Pulm: No labored breathing, no wheezing  CVS: Regular rate and rhythm  PSY: normal mood and affect  Skin: No rashes, erythema, or induration around shoulder     Focused Musculoskeletal Exam:     Side: Bilateral shoulder:  PROM:   FE (170)   ER 60 ABER/ABIR: 90/90  AROM:   FE (170)   ER 45 IR T8  Strength:  Supra [5/5] Infra [5/5] Subscap [5/5]  Abd [5/5]  Positive Stephen's on right  Special Tests  Shoulder  positive Kadeem and Loy      ACJ:  AC TTP: [neg]  Cross Arm [neg]   AC prominence [no]      [Sensation intact Ax/median/ulnar/radial distributions  Motor intact Ax/median/radial/ulnar/AIN/PIN    X-rays  and MRI of the shoulder independently viewed and interpreted: High-grade partial-thickness rotator cuff tear on the right side would likely biceps rupture, left with what appears to be a small full-thickness rotator cuff tear without retraction.    L Inj/Asp: R subacromial bursa on 10/4/2024 3:15 PM  Indications: pain  Details: 21 G needle, posterior approach  Medications: 40 mg triamcinolone acetonide 40 mg/mL; 4 mL lidocaine 10 mg/mL (1 %)  Outcome: tolerated well, no immediate  complications  Procedure, treatment alternatives, risks and benefits explained, specific risks discussed. Consent was given by the patient. Immediately prior to procedure a time out was called to verify the correct patient, procedure, equipment, support staff and site/side marked as required. Patient was prepped and draped in the usual sterile fashion.             The patient history, physical examination and imaging studies are consistent with the diagnosis above.    After a thorough discussion with the patient including expectations, I would recommend we continue a conservative program for now.  I reviewed he has a partial rotator cuff tear on the right and a small full-thickness on the left.  I reviewed this is amenable to nonoperative treatment we also discussed the potential for surgical treatment.  We discussed home/formal PT (deltoid isometrics, RTC strengthening, scapular stabilizers, stretching) and activity modification including avoidance of the positions and activities that provoke symptoms, including athletics.  We also discussed the ice and NSAIDS/tylenol.  We discussed the possibility of a corticosteroid injection and then I have provided that today at his request for his right shoulder.  If his symptoms do not resolve he can return which she can either consider the potential for surgery    We will see them  back in 4-6 weeks for further follow up.

## 2024-10-08 ENCOUNTER — TREATMENT (OUTPATIENT)
Dept: PHYSICAL THERAPY | Facility: CLINIC | Age: 59
End: 2024-10-08
Payer: COMMERCIAL

## 2024-10-08 DIAGNOSIS — M25.519 SHOULDER PAIN, UNSPECIFIED CHRONICITY, UNSPECIFIED LATERALITY: ICD-10-CM

## 2024-10-08 PROCEDURE — 97110 THERAPEUTIC EXERCISES: CPT | Mod: GP | Performed by: PHYSICAL THERAPIST

## 2024-10-08 NOTE — PROGRESS NOTES
Physical Therapy    Physical Therapy Treatment     Patient Name: Mikey Salinas  MRN: [id number]  Today's Date: 10/8/2024    Time Calculation  Start Time: 0827  Stop Time: 0907  Time Calculation (min): 40 min    Visit #: 8 out of 12 goals  Insurance: Visit 2/13 insurance   Evaluation date: 5/30/24 July 16th pt has MD follow up     Current Problem:   1. Shoulder pain, unspecified chronicity, unspecified laterality  Follow Up In Physical Therapy          SUBJECTIVE:   RT shoulder this past Friday injection   Dx with CTS wearing neck braces   neck MRI results pending end of this mo  Pt reports good tolerance to his last session, seemed good  Active over weekend 470 mile motor cycle ride so soreness increased  RT shoulder 1/10, left 1/10  Dr Dumont discussed possible RT shoulder surgery after Jan 1    past   MRI :   Per 6/3 note jazmine shay on 6/18  Left shoulder  Full thickness and full width tear of the supraspinatus tendon.  Acromioclavicular joint hypertrophy with mild impingement.  IMPRESSION:RT  Full thickness and near complete full width tear of the supraspinatus tendon.   Complete tear of the long head of the biceps tendon   Tear of the posterior superior labrum.   Acromioclavicular joint arthrosis with impingement.    IMPRESSION::CERVICAL 10/2/24  Alignment: There is a 3 mm C4-C5 retrolisthesis.  Mild multilevel degenerative changes of the cervical spine including  mild spinal canal stenosis at C3-C4.    Precautions:  blood thinners clotting disorder no DN     Scoliosis 16 deg,Grade 1 anterolisthesis L5/ S1,  bilateral pars defects,remote- Mild  compression fracture deformities of L2 and L3         Pain:   Start of session:see above subj   OBJECTIVE:    Stephen sign on RT     UE AROM:  Rt flexion 155 pain tight  RT flexion aarom 165  50 ER painful RT    8/21/24  SPADI-17% total, disability 19%, was 30/130 at initial eval 23% impairment  Shoulder: RT/LT  Flex 145 stiff right,165  Abd 150 eccentric lower pain  /168  ER 75 shoulder blade pain, 80 LT  IR loss, stiff, Rt 4 inch less LT T8  ER 70 deg RT  RT shoulder elevation, stiff, aarom rope and pulley  MMT strength  Shoulder: RT/LT  Flex 3-, 5  Abd 3-,5  ER infra  4, 5  IR sub scapularis 5,5  Supra 3+ RT/ 5 -LT    Treatments:  Therapeutic Exercise: (  40 minutes)no latex allergies  Shoulder rom per above   Stood Scapular adduction 2x10  Rope and pulley aarom RT flex/abd/POS x15   Shrug with backward roll 2x12  Codman's x10 EA  for/back/side, circles-RT  Supine:  - wand aarom IR/ER 2x12  -wand aarom press up 2x10  -DA wand elevation raises 2x10  -shoulder flexion 0-90 with bumper RT 2x12  -r/s at 90 deg flexion RT x10 cross with 1# wt  RT GH isometrics wall sub max x10/5 sec hold :  Flexion  Seated wand flexion DA 2x10 under 90 deg   Stand:  -wall wipes elevation x10 Rt/LT  -Rows blue issued for hep  dept 48# 3x10 DNP  -shoulder extension green  3x12   _-left: IR/ER 3x12 orange   RT orange ER 2x12  RT IR peach 3x12  Horizontal  abduction  partial range peach band 2x15    Supine:PROM RT shoulder elevation ,abduction, horiz abduction, gh oscillations DNP today    Manual Therapy: ( minutes)    Therapeutic Activity: ( minutes)     HEP:  HEP:perform all as tolerated   Access Code: [email or web address]  URL: [email or web address]  Date: 05/30/2024  Prepared by: Mariajose Quintero     Exercises  - Seated Scapular Retraction  - 2 x daily - 7 x weekly - 10 reps  - Seated Shoulder Shrug Circles AROM Backward  - 2 x daily - 7 x weekly - 10 reps  - Circular Shoulder Pendulum with Table Support  - 1 x daily - 7 x weekly - 10 reps  - Flexion-Extension Shoulder Pendulum with Table Support  - 1 x daily - 7 x weekly - 10 reps  - Horizontal Shoulder Pendulum with Table Support  - 1 x daily - 7 x weekly - 10 reps  Access Code: [email or web address] pt to perform as tolerated   URL: [email or web address]  Date: 06/26/2024  Prepared by: Mariajose Humphreys  - Standing Shoulder Row with Anchored  Resistance  - 3 x weekly - 2 sets - 10 reps  - Shoulder extension with resistance - Neutral  - 3 x weekly - 2 sets - 10 reps  - Isometric Shoulder Flexion at Wall  - 3 x weekly - 10 reps - 5 hold  - Standing Isometric Shoulder Internal Rotation at Doorway  - 3 x weekly - 10 reps - 5 hold  - Standing Isometric Shoulder External Rotation with Doorway  - 3 x weekly - 10 reps - 5 hold  - Isometric Shoulder Extension at Wall  - 3 x weekly - 10 reps - 5 hold  Can progress isometrics to 2 sets as time passes.    ASSESSMENT:   Pt with greater flexion rom today most likely with decreased sx post shot   Pt weaker on RT shoulder and more painful.      Progression with goals but slow rosie on RT  See goal achievement below  Post session pain:good, no greater pain, just end range tightness      PLAN:  follow pt 1x a week up to 12 sessions total   OP PT PLAN:  Treatment/Interventions: Education/Instruction , Manual Therapy  , Neuromuscular re-education , Self care/home management , Therapeutic activities , and Therapeutic exercise    PT Plan: Skilled PT   PT Frequency: 1-2 times per week  Duration:6-7  Certification Period Start Date:   Certification Period End Date:   Visits Approved: 13 at [facility] left of 20 for year   Rehab Potential: Good  Plan of Care Agreement: Patient         Goals:    STG 6  Pt will be I with HEP>>met  Pt will have full flexion arom RT shoulder without pain>>>NA   Pt will report 25% reduction in pain level shoulder>>A left   LTG 12  Pt will improve function via 10% reduced SPADI scores 23% impairment currently >>progress  Pt will increase RC scapular strength 1/2 to 1 full grade, to improve washing hair and reaching overhead with Rt arm.  >>NA

## 2024-10-21 ENCOUNTER — APPOINTMENT (OUTPATIENT)
Dept: PRIMARY CARE | Facility: CLINIC | Age: 59
End: 2024-10-21
Payer: COMMERCIAL

## 2024-10-24 ENCOUNTER — TREATMENT (OUTPATIENT)
Dept: PHYSICAL THERAPY | Facility: CLINIC | Age: 59
End: 2024-10-24
Payer: COMMERCIAL

## 2024-10-24 DIAGNOSIS — M25.519 SHOULDER PAIN, UNSPECIFIED CHRONICITY, UNSPECIFIED LATERALITY: ICD-10-CM

## 2024-10-24 PROCEDURE — 97110 THERAPEUTIC EXERCISES: CPT | Mod: GP | Performed by: PHYSICAL THERAPIST

## 2024-10-24 NOTE — PROGRESS NOTES
Physical Therapy    Physical Therapy Treatment     Patient Name: Mikey Salinas  MRN: [id number]  Today's Date: 10/24/2024    Time Calculation  Start Time: 0830  Stop Time: 0910  Time Calculation (min): 40 min    Visit #: 9 out of 12 goals  Insurance: Visit 2/13 insurance   Evaluation date: 5/30/24 July 16th pt has MD follow up     Current Problem:   1. Shoulder pain, unspecified chronicity, unspecified laterality  Follow Up In Physical Therapy          SUBJECTIVE:   RT shoulder better since injection  Dx with CTS wearing wrist braces night   neck MRI results pending with MD next week  Pt reports good tolerance to his last session  RT shoulder 1/10, left 0/10  Dr Dumont discussed possible RT shoulder surgery after Jan 1    past   MRI :   Per 6/3 note jazmine shay on 6/18  Left shoulder  Full thickness and full width tear of the supraspinatus tendon.  Acromioclavicular joint hypertrophy with mild impingement.  IMPRESSION:RT  Full thickness and near complete full width tear of the supraspinatus tendon.   Complete tear of the long head of the biceps tendon   Tear of the posterior superior labrum.   Acromioclavicular joint arthrosis with impingement.    IMPRESSION::CERVICAL 10/2/24  Alignment: There is a 3 mm C4-C5 retrolisthesis.  Mild multilevel degenerative changes of the cervical spine including  mild spinal canal stenosis at C3-C4.    Precautions:  blood thinners clotting disorder no DN     Scoliosis 16 deg,Grade 1 anterolisthesis L5/ S1,  bilateral pars defects,remote- Mild  compression fracture deformities of L2 and L3         Pain:   Start of session:see above subj   OBJECTIVE:    Stephen sign on RT     10/24/24  SPADI-17% total, disability 19%, was 30/130 at initial eval 23% impairment  Shoulder: RT/LT  Flex 155  right,165  Abd 152 ,168  ER 75 shoulder blade pain, 80 LT  IR loss, tightness, Rt 1 inch less LT T8  ER 70 deg RT  8/21/23  MMT strength  Shoulder: RT/LT  Flex 3-, 5  Abd 3-,5  ER infra  4, 5  IR sub  scapularis 5,5  Supra 3+ RT/ 5 -LT    Treatments:  Therapeutic Exercise: (  40 minutes)no latex allergies  Shoulder rom per above   Stood Scapular adduction 2x10  Rope and pulley aarom RT flex/abd/POS x15   Shrug with backward roll 2x13  Codman's x10 EA  for/back/side, circles-RT  Supine:  - wand aarom IR/ER 2x12 RT  -wand  press up x10, 2x10/1#  -DA wand elevation x10  -shoulder flexion 0-90 with bumper RT x10/1#  -r/s at 90 deg flexion RT x10 cross with 1# wt  RT GH isometrics wall sub max x12/5 sec hold : flexion, abduction   Seated wand flexion DA 2x10 under 90 deg   Stand:  -wall wipes POS 2x10 RT  -left/right  POS elevation under 90 deg x10 ea   -row DA 48# 3x12  -shoulder DA extension blue  2x10   left: IR-2x10 green/ER 2x15 orange   RT orange ER 2x14  RT IR green 2x10  Horizontal  abduction  partial range peach band 2x15    Supine:PROM RT shoulder elevation ,abduction, horiz abduction, gh oscillations DNP today    Manual Therapy: ( minutes)    Therapeutic Activity: ( minutes)     HEP:  HEP:perform all as tolerated   Access Code: [email or web address]  URL: [email or web address]  Date: 05/30/2024  Prepared by: Mariajose Quintero     Exercises  - Seated Scapular Retraction  - 2 x daily - 7 x weekly - 10 reps  - Seated Shoulder Shrug Circles AROM Backward  - 2 x daily - 7 x weekly - 10 reps  - Circular Shoulder Pendulum with Table Support  - 1 x daily - 7 x weekly - 10 reps  - Flexion-Extension Shoulder Pendulum with Table Support  - 1 x daily - 7 x weekly - 10 reps  - Horizontal Shoulder Pendulum with Table Support  - 1 x daily - 7 x weekly - 10 reps  Access Code: [email or web address] pt to perform as tolerated   URL: [email or web address]  Date: 06/26/2024  Prepared by: Mariajose Quintero    Exercises  - Standing Shoulder Row with Anchored Resistance  - 3 x weekly - 2 sets - 10 reps  - Shoulder extension with resistance - Neutral  - 3 x weekly - 2 sets - 10 reps  - Isometric Shoulder Flexion at Wall  - 3 x weekly - 10 reps - 5  hold  - Standing Isometric Shoulder Internal Rotation at Doorway  - 3 x weekly - 10 reps - 5 hold  - Standing Isometric Shoulder External Rotation with Doorway  - 3 x weekly - 10 reps - 5 hold  - Isometric Shoulder Extension at Wall  - 3 x weekly - 10 reps - 5 hold  Can progress isometrics to 2 sets as time passes.    10/24/24 Add standing cali under 90, flexion supine soup can under 90, abd isometric   ASSESSMENT:   Pt able to do more antigravity and light resistance on RT with no pinching he normally can feel  Progression with goals but slow rosie on RT  See goal achievement below  Post session pain:good, no greater pain     PLAN:  follow pt 1x a week up to 12 sessions total   OP PT PLAN:  Treatment/Interventions: Education/Instruction , Manual Therapy  , Neuromuscular re-education , Self care/home management , Therapeutic activities , and Therapeutic exercise    PT Plan: Skilled PT   PT Frequency: 1-2 times per week  Duration:6-7  Certification Period Start Date:   Certification Period End Date:   Visits Approved: 13 at eval left of 20 for year   Rehab Potential: Good  Plan of Care Agreement: Patient         Goals:    STG 6  Pt will be I with HEP>>met  Pt will have full flexion arom RT shoulder without pain>>>NA   Pt will report 25% reduction in pain level shoulder>>A left   LTG 12  Pt will improve function via 10% reduced SPADI scores 23% impairment currently >>progress  Pt will increase RC scapular strength 1/2 to 1 full grade, to improve washing hair and reaching overhead with Rt arm.  >>NA

## 2024-10-28 ENCOUNTER — APPOINTMENT (OUTPATIENT)
Dept: PRIMARY CARE | Facility: CLINIC | Age: 59
End: 2024-10-28
Payer: COMMERCIAL

## 2024-11-01 ENCOUNTER — APPOINTMENT (OUTPATIENT)
Dept: CARDIOLOGY | Facility: CLINIC | Age: 59
End: 2024-11-01
Payer: COMMERCIAL

## 2024-11-04 ENCOUNTER — APPOINTMENT (OUTPATIENT)
Dept: PHYSICAL THERAPY | Facility: CLINIC | Age: 59
End: 2024-11-04
Payer: COMMERCIAL

## 2024-11-04 ENCOUNTER — APPOINTMENT (OUTPATIENT)
Dept: PRIMARY CARE | Facility: CLINIC | Age: 59
End: 2024-11-04
Payer: COMMERCIAL

## 2024-11-05 ENCOUNTER — APPOINTMENT (OUTPATIENT)
Dept: INTEGRATIVE MEDICINE | Facility: CLINIC | Age: 59
End: 2024-11-05
Payer: COMMERCIAL

## 2024-11-06 ENCOUNTER — APPOINTMENT (OUTPATIENT)
Dept: PHYSICAL THERAPY | Facility: CLINIC | Age: 59
End: 2024-11-06
Payer: COMMERCIAL

## 2024-11-08 ENCOUNTER — TREATMENT (OUTPATIENT)
Dept: PHYSICAL THERAPY | Facility: CLINIC | Age: 59
End: 2024-11-08
Payer: COMMERCIAL

## 2024-11-08 DIAGNOSIS — M25.519 SHOULDER PAIN, UNSPECIFIED CHRONICITY, UNSPECIFIED LATERALITY: Primary | ICD-10-CM

## 2024-11-08 PROCEDURE — 97110 THERAPEUTIC EXERCISES: CPT | Mod: GP | Performed by: PHYSICAL THERAPIST

## 2024-11-08 NOTE — PROGRESS NOTES
Physical Therapy    Physical Therapy Treatment     Patient Name: Mikey Salinas  MRN: [id number]  Today's Date: 11/8/2024    Time Calculation  Start Time: 0745  Stop Time: 0824  Time Calculation (min): 39 min    Visit #: 10 out of 12 goals  Insurance: Visit 2/13 insurance   Evaluation date: 5/30/24 July 16th pt has MD follow up     Current Problem:   1. Shoulder pain, unspecified chronicity, unspecified laterality  Follow Up In Physical Therapy          SUBJECTIVE:   Both shoulders are sore after doing the chain saw x 3 days  Dx with CTS wearing wrist braces night   neck MRI results DC  is doing some stretching of neck   Pt reports good tolerance to his last session  RT shoulder 3/10, left 3/10  Dr Dumont discussed possible RT shoulder surgery after Jan 1    past   MRI :   Per 6/3 note jazmine shay on 6/18  Left shoulder  Full thickness and full width tear of the supraspinatus tendon.  Acromioclavicular joint hypertrophy with mild impingement.  IMPRESSION:RT  Full thickness and near complete full width tear of the supraspinatus tendon.   Complete tear of the long head of the biceps tendon   Tear of the posterior superior labrum.   Acromioclavicular joint arthrosis with impingement.    IMPRESSION::CERVICAL 10/2/24  Alignment: There is a 3 mm C4-C5 retrolisthesis.  Mild multilevel degenerative changes of the cervical spine including  mild spinal canal stenosis at C3-C4.    Precautions:  blood thinners clotting disorder no DN     Scoliosis 16 deg,Grade 1 anterolisthesis L5/ S1,  bilateral pars defects,remote- Mild  compression fracture deformities of L2 and L3         Pain:   Start of session:see above subj   OBJECTIVE:    Arom:   stand flexion 160 right just stiff , 168 left  Full abduction LT/RT pain with eccentric lower RT    10/24/24  SPADI-17% total, disability 19%, was 30/130 at initial eval 23% impairment  Shoulder: RT/LT  Flex 155  right,165  Abd 152 ,168  ER 75 shoulder blade pain, 80 LT  IR loss, tightness, Rt 1  inch less LT T8  ER 70 deg RT  8/21/23  MMT strength  Shoulder: RT/LT  Flex 3-, 5  Abd 3-,5  ER infra  4, 5  IR sub scapularis 5,5  Supra 3+ RT/ 5 -LT    Treatments:  Therapeutic Exercise: (  39 minutes)no latex allergies  Shoulder rom per above   Stood Scapular adduction 2x10  Rope and pulley aarom RT flex/abd/POS x15   Shrug with backward roll 2x15  Codman's x10 EA  for/back/side, circles-RT  Supine:  - wand aarom IR/ER 2x12 RT  -RT SA press up x2x10/1#  -shoulder flexion 0-90 with bumper RT 2x10/1#  -r/s at 90/110 deg flexion RT with PT  RT GH isometrics wall sub max x12/5 sec hold : flexion, abduction     Stand:  -wall wipes POS 2x12 RT  -right  POS elevation under 90 deg x10/0#, left 2x10/1#  -row DA 48# 3x15  -shoulder DA extension blue  3x10   left: IR-2x10 blue/ER 3x10 green    RT: orange ER 2x14/RT IR green 3x10  Horizontal  abduction  partial range peach band 2x15    Supine:PROM RT shoulder elevation ,abduction, horiz abduction, gh oscillations DNP today    Manual Therapy: ( minutes)    Therapeutic Activity: ( minutes)     HEP:  HEP:perform all as tolerated   Access Code: [email or web address]  URL: [email or web address]  Date: 05/30/2024  Prepared by: Mariajose Quintero     Exercises  - Seated Scapular Retraction  - 2 x daily - 7 x weekly - 10 reps  - Seated Shoulder Shrug Circles AROM Backward  - 2 x daily - 7 x weekly - 10 reps  - Circular Shoulder Pendulum with Table Support  - 1 x daily - 7 x weekly - 10 reps  - Flexion-Extension Shoulder Pendulum with Table Support  - 1 x daily - 7 x weekly - 10 reps  - Horizontal Shoulder Pendulum with Table Support  - 1 x daily - 7 x weekly - 10 reps  Access Code: [email or web address] pt to perform as tolerated   URL: [email or web address]  Date: 06/26/2024  Prepared by: Mariajose Quintero    Exercises  - Standing Shoulder Row with Anchored Resistance  - 3 x weekly - 2 sets - 10 reps  - Shoulder extension with resistance - Neutral  - 3 x weekly - 2 sets - 10 reps  - Isometric Shoulder  Flexion at Wall  - 3 x weekly - 10 reps - 5 hold  - Standing Isometric Shoulder Internal Rotation at Doorway  - 3 x weekly - 10 reps - 5 hold  - Standing Isometric Shoulder External Rotation with Doorway  - 3 x weekly - 10 reps - 5 hold  - Isometric Shoulder Extension at Wall  - 3 x weekly - 10 reps - 5 hold  Can progress isometrics to 2 sets as time passes.    10/24/24 Add standing cali under 90, flexion supine soup can under 90, abd isometric   ASSESSMENT:   Pt more painful with chain sawing.  Pt able to take some increases.    Progression with goals but slow rosie on RT  See goal achievement below  Post session pain:good, no greater pain     PLAN:  follow pt 1x a week up to 12 sessions total   OP PT PLAN:  Treatment/Interventions: Education/Instruction , Manual Therapy  , Neuromuscular re-education , Self care/home management , Therapeutic activities , and Therapeutic exercise    PT Plan: Skilled PT   PT Frequency: 1-2 times per week  Duration:6-7  Certification Period Start Date:   Certification Period End Date:   Visits Approved: 13 at [facility] left of 20 for year   Rehab Potential: Good  Plan of Care Agreement: Patient         Goals:    STG 6  Pt will be I with HEP>>met  Pt will have full flexion arom RT shoulder without pain>>>NA   Pt will report 25% reduction in pain level shoulder>>A left   LTG 12  Pt will improve function via 10% reduced SPADI scores 23% impairment currently >>progress  Pt will increase RC scapular strength 1/2 to 1 full grade, to improve washing hair and reaching overhead with Rt arm.  >>NA

## 2024-11-14 LAB — BODY SURFACE AREA: 2.21 M2

## 2024-11-15 DIAGNOSIS — Z76.0 MEDICATION REFILL: ICD-10-CM

## 2024-11-15 DIAGNOSIS — Z86.711 HISTORY OF PULMONARY EMBOLISM: ICD-10-CM

## 2024-11-18 PROBLEM — R05.3 CHRONIC COUGH: Status: ACTIVE | Noted: 2024-11-18

## 2024-11-18 PROBLEM — Z20.822 CONTACT WITH AND (SUSPECTED) EXPOSURE TO COVID-19: Status: ACTIVE | Noted: 2022-12-09

## 2024-11-18 PROBLEM — R21 RASH AND OTHER NONSPECIFIC SKIN ERUPTION: Status: ACTIVE | Noted: 2024-11-18

## 2024-11-18 PROBLEM — R14.0 ABDOMINAL BLOATING: Status: ACTIVE | Noted: 2024-11-18

## 2024-11-18 PROBLEM — Z86.2 HISTORY OF HYPERCOAGULABLE STATE: Status: ACTIVE | Noted: 2024-11-18

## 2024-11-18 RX ORDER — RIVAROXABAN 20 MG/1
20 TABLET, FILM COATED ORAL DAILY
Qty: 90 TABLET | Refills: 3 | Status: SHIPPED | OUTPATIENT
Start: 2024-11-18

## 2024-11-20 ENCOUNTER — APPOINTMENT (OUTPATIENT)
Dept: ORTHOPEDIC SURGERY | Facility: CLINIC | Age: 59
End: 2024-11-20
Payer: COMMERCIAL

## 2024-11-20 DIAGNOSIS — M75.101 TEAR OF RIGHT SUPRASPINATUS TENDON: Primary | ICD-10-CM

## 2024-11-20 DIAGNOSIS — M19.011 OSTEOARTHRITIS OF RIGHT ACROMIOCLAVICULAR JOINT: ICD-10-CM

## 2024-11-20 DIAGNOSIS — S43.431D TEAR OF RIGHT GLENOID LABRUM, SUBSEQUENT ENCOUNTER: ICD-10-CM

## 2024-11-20 PROCEDURE — 99213 OFFICE O/P EST LOW 20 MIN: CPT | Performed by: STUDENT IN AN ORGANIZED HEALTH CARE EDUCATION/TRAINING PROGRAM

## 2024-11-20 NOTE — PROGRESS NOTES
Mikey Salinas is a 58 y.o. year-old  male  he returns regarding his right shoulder.  We had given him an injection at last visit this helped quite a bit he was able to go on his moose hunt without any issues.  Afterwards he said he was lifting something and he felt a tearing sensation in his shoulder and is concerned he may have torn his rotator cuff further.      Past Medical, Family, and Social History reviewed     Review of Systems  A complete review of systems was conducted, pertinent only to the HPI noted above.    Physical Exam  GEN: Alert and Oriented x 3  Constitutional: Well appearing, in no apparent distress.  Eyes: sclera anicteric  ENT: hearing appropriate for normal conversation, neck appears symmetric with no gross thyromegaly  Pulm: No labored breathing, no wheezing  CVS: Regular rate and rhythm  PSY: normal mood and affect  Skin: No rashes, erythema, or induration around shoulder     Focused Musculoskeletal Exam:     Side: Right shoulder:  PROM:   FE (170)   ER 60 ABER/ABIR: 90/90  AROM:   FE (170)   ER 45 IR T8  Strength:  Supra [4/5] Infra [5/5] Subscap [5/5]  Abd [5/5]  Positive Stephen's on right  Special Tests  Shoulder  positive Neer and Granado  Pain and weakness with empty can    ACJ:  AC TTP: [neg]  Cross Arm [neg]   AC prominence [no]      [Sensation intact Ax/median/ulnar/radial distributions  Motor intact Ax/median/radial/ulnar/AIN/PIN    X-rays  and MRI of the shoulder independently viewed and interpreted: High-grade partial-thickness rotator cuff tear on the right side would likely biceps rupture, left with what appears to be a small full-thickness rotator cuff tear without retraction.      The patient history, physical examination and imaging studies are consistent with the diagnosis above.    I did review with the patient his previous MRI had a high-grade partial-thickness rotator cuff tear with a possible area of full-thickness tearing.  Given his recent injury and increase in  symptoms concern for further tearing of his rotator cuff.  I discussed and recommended a repeat MRI to evaluate for full-thickness rotator cuff tear.  This has been ordered he will return to review.

## 2024-11-21 ENCOUNTER — APPOINTMENT (OUTPATIENT)
Dept: CARDIOLOGY | Facility: CLINIC | Age: 59
End: 2024-11-21
Payer: COMMERCIAL

## 2024-11-21 VITALS
TEMPERATURE: 98.6 F | WEIGHT: 224 LBS | BODY MASS INDEX: 32.07 KG/M2 | HEART RATE: 63 BPM | OXYGEN SATURATION: 95 % | DIASTOLIC BLOOD PRESSURE: 85 MMHG | HEIGHT: 70 IN | SYSTOLIC BLOOD PRESSURE: 131 MMHG | RESPIRATION RATE: 16 BRPM

## 2024-11-21 DIAGNOSIS — R42 DIZZINESS: Primary | ICD-10-CM

## 2024-11-21 DIAGNOSIS — Z86.718 HISTORY OF DEEP VENOUS THROMBOSIS (DVT) OF DISTAL VEIN OF LEFT LOWER EXTREMITY: ICD-10-CM

## 2024-11-21 DIAGNOSIS — R07.2 PRECORDIAL PAIN: ICD-10-CM

## 2024-11-21 DIAGNOSIS — R07.89 TIGHT CHEST: ICD-10-CM

## 2024-11-21 DIAGNOSIS — R00.2 PALPITATIONS: ICD-10-CM

## 2024-11-21 DIAGNOSIS — E78.2 MIXED HYPERLIPIDEMIA: ICD-10-CM

## 2024-11-21 PROCEDURE — 99214 OFFICE O/P EST MOD 30 MIN: CPT | Performed by: INTERNAL MEDICINE

## 2024-11-21 PROCEDURE — 3008F BODY MASS INDEX DOCD: CPT | Performed by: INTERNAL MEDICINE

## 2024-11-21 PROCEDURE — 1036F TOBACCO NON-USER: CPT | Performed by: INTERNAL MEDICINE

## 2024-11-21 ASSESSMENT — LIFESTYLE VARIABLES
HAS A RELATIVE, FRIEND, DOCTOR, OR ANOTHER HEALTH PROFESSIONAL EXPRESSED CONCERN ABOUT YOUR DRINKING OR SUGGESTED YOU CUT DOWN: NO
AUDIT TOTAL SCORE: 1
AUDIT-C TOTAL SCORE: 1
HOW OFTEN DO YOU HAVE A DRINK CONTAINING ALCOHOL: MONTHLY OR LESS
HOW MANY STANDARD DRINKS CONTAINING ALCOHOL DO YOU HAVE ON A TYPICAL DAY: 1 OR 2
SKIP TO QUESTIONS 9-10: 1
HAVE YOU OR SOMEONE ELSE BEEN INJURED AS A RESULT OF YOUR DRINKING: NO
HOW OFTEN DO YOU HAVE SIX OR MORE DRINKS ON ONE OCCASION: NEVER

## 2024-11-21 ASSESSMENT — ENCOUNTER SYMPTOMS
DEPRESSION: 0
LOSS OF SENSATION IN FEET: 0
OCCASIONAL FEELINGS OF UNSTEADINESS: 0

## 2024-11-21 ASSESSMENT — PAIN SCALES - GENERAL: PAINLEVEL_OUTOF10: 0-NO PAIN

## 2024-11-21 NOTE — PROGRESS NOTES
History of present illness:  58 year old male patient here today following up on hx of PE a few years ago on oral anticoagulation..  Patient following up in my office after episode of palpitations after taking Neurontin.  Patient had a stress test in July 2023 showed no ischemia.  Had a Holter monitor in September 2024 showed no major arrhythmias.  Has been feeling overall good.  Nuys any chest pain shortness of breath palpitations dizziness lightheadedness or syncope.    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    Hereditary deficiency of other clotting factors     Factor II deficiency    History of deep venous thrombosis (DVT) of distal vein of left lower extremity 10/19/2023    2014    History of pulmonary embolism 10/19/2023    2014    Hyperlipidemia 09/19/2023    Other pulmonary embolism without acute cor pulmonale 02/03/2015    Pulmonary embolism    Personal history of diseases of the blood and blood-forming organs and certain disorders involving the immune mechanism     History of hypercoagulable state    Personal history of other venous thrombosis and embolism     History of deep venous thrombosis    Scoliosis 1978    Traumatic tear of supraspinatus tendon of right shoulder     left shoulder as well       Past Surgical History:   Procedure Laterality Date    KNEE ARTHROSCOPY W/ DEBRIDEMENT  02/02/2015    Arthroscopy Knee Left    LIPOMA RESECTION N/A 09/2023    multiple abdominal lipoma's excised    MR HEAD ANGIO WO IV CONTRAST  12/15/2016    MR HEAD ANGIO WO IV CONTRAST [facility]    WISDOM TOOTH EXTRACTION  1982       Allergies   Allergen Reactions    Chocolate Flavor GI Upset    Cocoa Nausea/vomiting    Penicillins Rash        reports that he has never smoked. He has never been exposed to tobacco smoke. He has never used smokeless tobacco. He reports current alcohol use of about 1.0 standard drink of alcohol per week. He  reports that he does not use drugs.    Family History   Problem Relation Name Age of Onset    Macular degeneration Mother Megha     Osteoporosis Mother Megha     Transient ischemic attack Mother Megha     Brain Aneurysm Mother Megha     Hypertension Mother Megha     Other (Heart valve surgery) Father Ray     Other (RHEUMATIC HEART DISEASE) Father Ray     Nephrolithiasis Father Ray     Heart disease Father Ray     Hypertension Father Ray     Breast cancer Sister Peg 58    Irritable bowel syndrome Sister Peg     ROGER disease Sister Peg     Hypertension Sister Peg     Cancer Sister Peg     Prostate cancer Brother      Cancer Brother Yves Salinas     Cancer Other GRAND FATHER        Patient's Medications   New Prescriptions    No medications on file   Previous Medications    ACETAMINOPHEN (TYLENOL) 325 MG TABLET    Take by mouth.    CHLORDIAZEPOXIDE-CLIDINIUM (LIBRAX) 5-2.5 MG CAPSULE    Take 4 capsules by mouth 4 times a day before meals.    DICLOFENAC SODIUM (VOLTAREN) 1 % GEL GEL    Apply topically.    FLUTICASONE (FLONASE ALLERGY RELIEF) 50 MCG/ACTUATION NASAL SPRAY    Administer 1 spray into each nostril once daily.    HYDROXYZINE HCL (ATARAX) 25 MG TABLET    Take 1 tablet (25 mg) by mouth every 8 hours if needed (anxiety or insomnia).    L.ACID/L.CASEI/B.BIF/B.LIZZY/FOS (PROBIOTIC BLEND ORAL)    Take by mouth.    METOPROLOL SUCCINATE XL (TOPROL-XL) 25 MG 24 HR TABLET    Take 1 tablet (25 mg) by mouth once daily. Do not crush or chew.    XARELTO 20 MG TABLET    TAKE 1 TABLET DAILY   Modified Medications    No medications on file   Discontinued Medications    No medications on file       Objective   Physical Exam  General: Patient in no acute distress   HEENT: Atraumatic normocephalic.  Neck: Supple, jugular venous pressure within normal limit.  No bruits  Lungs: Clear to auscultation bilaterally  Cardiovascular: Regular rate and rhythm, normal heart sounds, no murmurs rubs or gallops  Abdomen: Soft nontender  nondistended.  Normal bowel sounds.  Extremities: Warm to touch, no radha    Lab Review   No visits with results within 2 Month(s) from this visit.   Latest known visit with results is:   Ancillary Procedure on 09/03/2024   Component Date Value    BSA 09/03/2024 2.21         Assessment/Plan   Patient Active Problem List   Diagnosis    Anxiety    Factor II deficiency (Multi)    Hyperlipidemia    Lipoma    Obesity    Pityriasis rosea    Scoliosis    Tension type headache    History of pulmonary embolism    History of deep venous thrombosis (DVT) of distal vein of left lower extremity    Age-related nuclear cataract of both eyes    Gastroesophageal reflux disease with hiatal hernia    Chronic bilateral low back pain    Shoulder pain    Pain in joint of right shoulder    Arthrosis of right acromioclavicular joint    Biceps strain, right, subsequent encounter    Rotator cuff strain, right, subsequent encounter    Sprain of right shoulder, subsequent encounter    Tear of right supraspinatus tendon    Tear of biceps tendon    Tear of right glenoid labrum    Tear of left supraspinatus tendon    Impingement of left shoulder    Abdominal bloating    Contact with and (suspected) exposure to covid-19    Chronic cough    History of hypercoagulable state    Rash and other nonspecific skin eruption      58 year old male patient here today following up on hx of PE a few years ago on oral anticoagulation..  Patient following up in my office after episode of palpitations after taking Neurontin.  Patient had a stress test in July 2023 showed no ischemia.  Had a Holter monitor in September 2024 showed no major arrhythmias.  Has been feeling overall good.  Nuys any chest pain shortness of breath palpitations dizziness lightheadedness or syncope.    At this point will obtain echocardiogram as workup for dizziness palpitations the patient may have randomly.  Otherwise his blood pressure and heart rate well-controlled at home.  Stable from  my standpoint.  Continue oral anticoagulation.  Will follow-up in 6 months.  Of note patient had coronary calcium score of 0 in 2023.  No indication for statin for the time being.  Will monitor his LDL levels.      Rodo Kaur MD

## 2024-11-25 ENCOUNTER — APPOINTMENT (OUTPATIENT)
Dept: ORTHOPEDIC SURGERY | Facility: CLINIC | Age: 59
End: 2024-11-25
Payer: COMMERCIAL

## 2024-12-04 ENCOUNTER — HOSPITAL ENCOUNTER (OUTPATIENT)
Dept: RADIOLOGY | Facility: HOSPITAL | Age: 59
Discharge: HOME | End: 2024-12-04
Payer: COMMERCIAL

## 2024-12-04 ENCOUNTER — HOSPITAL ENCOUNTER (OUTPATIENT)
Dept: CARDIOLOGY | Facility: HOSPITAL | Age: 59
Discharge: HOME | End: 2024-12-04
Payer: COMMERCIAL

## 2024-12-04 VITALS — DIASTOLIC BLOOD PRESSURE: 85 MMHG | SYSTOLIC BLOOD PRESSURE: 131 MMHG

## 2024-12-04 DIAGNOSIS — S43.431D TEAR OF RIGHT GLENOID LABRUM, SUBSEQUENT ENCOUNTER: ICD-10-CM

## 2024-12-04 DIAGNOSIS — R06.02 SHORTNESS OF BREATH: ICD-10-CM

## 2024-12-04 DIAGNOSIS — M19.011 OSTEOARTHRITIS OF RIGHT ACROMIOCLAVICULAR JOINT: ICD-10-CM

## 2024-12-04 DIAGNOSIS — R42 DIZZINESS: ICD-10-CM

## 2024-12-04 PROCEDURE — 73221 MRI JOINT UPR EXTREM W/O DYE: CPT | Mod: RT

## 2024-12-04 PROCEDURE — 73221 MRI JOINT UPR EXTREM W/O DYE: CPT | Mod: RIGHT SIDE | Performed by: RADIOLOGY

## 2024-12-04 PROCEDURE — 76376 3D RENDER W/INTRP POSTPROCES: CPT

## 2024-12-04 PROCEDURE — 93356 MYOCRD STRAIN IMG SPCKL TRCK: CPT | Performed by: INTERNAL MEDICINE

## 2024-12-04 PROCEDURE — 76376 3D RENDER W/INTRP POSTPROCES: CPT | Performed by: INTERNAL MEDICINE

## 2024-12-04 PROCEDURE — 93306 TTE W/DOPPLER COMPLETE: CPT | Performed by: INTERNAL MEDICINE

## 2024-12-05 LAB
AORTIC VALVE PEAK VELOCITY: 1.02 M/S
AV PEAK GRADIENT: 4 MMHG
AVA (PEAK VEL): 3.45 CM2
EJECTION FRACTION APICAL 4 CHAMBER: 50.2
EJECTION FRACTION: 53 %
GLOBAL LONGITUDINAL STRAIN: 19 %
LEFT ATRIUM VOLUME AREA LENGTH INDEX BSA: 33.8 ML/M2
LEFT VENTRICLE INTERNAL DIMENSION DIASTOLE: 5.58 CM (ref 3.5–6)
LEFT VENTRICULAR OUTFLOW TRACT DIAMETER: 2.45 CM
LV EJECTION FRACTION BIPLANE: 51 %
MITRAL VALVE E/A RATIO: 0.81
MITRAL VALVE E/E' RATIO: 3.3
RIGHT VENTRICLE FREE WALL PEAK S': 13.23 CM/S
TRICUSPID ANNULAR PLANE SYSTOLIC EXCURSION: 1.7 CM

## 2024-12-13 ENCOUNTER — TELEPHONE (OUTPATIENT)
Dept: CARDIOLOGY | Facility: CLINIC | Age: 59
End: 2024-12-13

## 2024-12-13 ENCOUNTER — APPOINTMENT (OUTPATIENT)
Dept: ORTHOPEDIC SURGERY | Facility: CLINIC | Age: 59
End: 2024-12-13
Payer: COMMERCIAL

## 2024-12-13 DIAGNOSIS — M75.121 NONTRAUMATIC COMPLETE TEAR OF RIGHT ROTATOR CUFF: Primary | ICD-10-CM

## 2024-12-13 DIAGNOSIS — S43.431D TEAR OF RIGHT GLENOID LABRUM, SUBSEQUENT ENCOUNTER: ICD-10-CM

## 2024-12-13 PROCEDURE — 99214 OFFICE O/P EST MOD 30 MIN: CPT | Performed by: STUDENT IN AN ORGANIZED HEALTH CARE EDUCATION/TRAINING PROGRAM

## 2024-12-13 NOTE — TELEPHONE ENCOUNTER
----- Message from Rodo Kaur sent at 12/12/2024 11:26 AM EST -----  Tell patient that his echocardiogram was okay.  Let me see him as scheduled  ----- Message -----  From: Anil Syngo - Cardiology Results In  Sent: 12/5/2024   4:29 PM EST  To: Rodo Kaur MD

## 2024-12-13 NOTE — TELEPHONE ENCOUNTER
----- Message from Rodo Kaur sent at 12/12/2024  9:27 AM EST -----  Tell patient that his monitor overall.  Let me see my scheduled.  No major heart rhythm problems  ----- Message -----  From: Rodo Kaur MD  Sent: 11/14/2024   3:18 PM EST  To: Rodo Kaur MD

## 2024-12-13 NOTE — PROGRESS NOTES
Mikey Salinas is a 59 y.o. year-old  male  he returns regarding his right shoulder.  He has gotten his recent MRI and comes to review.      Past Medical, Family, and Social History reviewed     Review of Systems  A complete review of systems was conducted, pertinent only to the HPI noted above.    Physical Exam  GEN: Alert and Oriented x 3  Constitutional: Well appearing, in no apparent distress.  Eyes: sclera anicteric  ENT: hearing appropriate for normal conversation, neck appears symmetric with no gross thyromegaly  Pulm: No labored breathing, no wheezing  CVS: Regular rate and rhythm  PSY: normal mood and affect  Skin: No rashes, erythema, or induration around shoulder     Focused Musculoskeletal Exam:     Side: Right shoulder:  PROM:   FE (170)   ER 60 ABER/ABIR: 90/90  AROM:   FE (170)   ER 45 IR T8  Strength:  Supra [4/5] Infra [5/5] Subscap [5/5]  Abd [5/5]  Positive Stephen's on right  Special Tests  Shoulder  positive Neer and Granado  Pain and weakness with empty can    ACJ:  AC TTP: [neg]  Cross Arm [neg]   AC prominence [no]      [Sensation intact Ax/median/ulnar/radial distributions  Motor intact Ax/median/radial/ulnar/AIN/PIN    X-rays  and MRI of the shoulder independently viewed and interpreted: High-grade partial-thickness rotator cuff tear on the right side would likely biceps rupture, left with what appears to be a small full-thickness rotator cuff tear without retraction.    Repeat MRI of the right shoulder demonstrates progression of his rotator cuff tear with now full-thickness roughly 2 cm tear with minimal retraction, chronic biceps rupture, degenerative labrum tear mild DJD    Patient was prescribed a [abduction sling for [rotator cuff tear. The patient has weakness, instability and/or deformity of their [Right shoulder which requires stabilization from this orthosis to improve their function.  Verbal and written instructions for the use, wear schedule, cleaning and application of this item  were given.  Patient was instructed that should the brace result in increased pain, decreased sensation, increased swelling, or an overall worsening of their medical condition, to please contact our office immediately. Orthotic management and training was provided for skin care, modifications due to healing tissues, edema changes, interruption in skin integrity, and safety precautions with the orthosis.     The patient history, physical examination and imaging studies are consistent with the aforementioned assessment. The treatment options including medical management and surgery including rotator cuff repair, possible bioconductive bovine collagen implant  were discussed at length. I discussed at length the importance of post-operative rehab and the risk for adhesive capsulitis if this therapy is not appropriately attended. The risks and benefits of the surgery including but not limited to bleeding, infection, nerve injury, and potential future surgery were also presented and reviewed. I discussed at length the fact that this surgery is designed to address pain and that recovery of strength is not predictable.  He is on a blood thinner for hypercoagulability, will need to see PCP regarding recommendations of coming off. all questions were answered. The patient acknowledged the explanation, agreed to proceed with the plan.

## 2024-12-16 PROBLEM — S46.219A STRAIN OF MUSCLE, FASCIA AND TENDON OF OTHER PARTS OF BICEPS, UNSPECIFIED ARM, INITIAL ENCOUNTER: Status: ACTIVE | Noted: 2024-12-13

## 2024-12-16 PROBLEM — M75.102 UNSPECIFIED ROTATOR CUFF TEAR OR RUPTURE OF LEFT SHOULDER, NOT SPECIFIED AS TRAUMATIC: Status: ACTIVE | Noted: 2024-12-13

## 2024-12-16 PROBLEM — M75.101 UNSPECIFIED ROTATOR CUFF TEAR OR RUPTURE OF RIGHT SHOULDER, NOT SPECIFIED AS TRAUMATIC: Status: ACTIVE | Noted: 2024-12-13

## 2024-12-31 ENCOUNTER — LAB (OUTPATIENT)
Dept: LAB | Facility: LAB | Age: 59
End: 2024-12-31
Payer: COMMERCIAL

## 2024-12-31 DIAGNOSIS — Z12.5 SCREENING PSA (PROSTATE SPECIFIC ANTIGEN): ICD-10-CM

## 2024-12-31 DIAGNOSIS — E78.5 HYPERLIPIDEMIA, UNSPECIFIED HYPERLIPIDEMIA TYPE: ICD-10-CM

## 2024-12-31 LAB
CHOLEST SERPL-MCNC: 196 MG/DL (ref 0–199)
CHOLEST/HDLC SERPL: 3.8 {RATIO}
HDLC SERPL-MCNC: 51.6 MG/DL
LDLC SERPL CALC-MCNC: 132 MG/DL
NON HDL CHOLESTEROL: 144 MG/DL (ref 0–149)
PSA SERPL-MCNC: 1.01 NG/ML
TRIGL SERPL-MCNC: 61 MG/DL (ref 0–149)
VLDL: 12 MG/DL (ref 0–40)

## 2024-12-31 PROCEDURE — G0103 PSA SCREENING: HCPCS

## 2024-12-31 PROCEDURE — 80061 LIPID PANEL: CPT

## 2025-01-05 ASSESSMENT — PROMIS GLOBAL HEALTH SCALE
RATE_QUALITY_OF_LIFE: VERY GOOD
RATE_GENERAL_HEALTH: GOOD
CARRYOUT_SOCIAL_ACTIVITIES: VERY GOOD
RATE_SOCIAL_SATISFACTION: VERY GOOD
RATE_AVERAGE_PAIN: 1
RATE_PHYSICAL_HEALTH: GOOD
CARRYOUT_PHYSICAL_ACTIVITIES: COMPLETELY
RATE_MENTAL_HEALTH: GOOD
EMOTIONAL_PROBLEMS: RARELY
RATE_AVERAGE_FATIGUE: MILD

## 2025-01-06 ENCOUNTER — OFFICE VISIT (OUTPATIENT)
Dept: PRIMARY CARE | Facility: CLINIC | Age: 60
End: 2025-01-06
Payer: COMMERCIAL

## 2025-01-06 VITALS
OXYGEN SATURATION: 96 % | SYSTOLIC BLOOD PRESSURE: 114 MMHG | DIASTOLIC BLOOD PRESSURE: 80 MMHG | WEIGHT: 227.4 LBS | BODY MASS INDEX: 32.56 KG/M2 | HEIGHT: 70 IN | HEART RATE: 71 BPM | TEMPERATURE: 97.8 F

## 2025-01-06 DIAGNOSIS — Z00.00 ANNUAL PHYSICAL EXAM: Primary | ICD-10-CM

## 2025-01-06 DIAGNOSIS — D68.2 FACTOR II DEFICIENCY (MULTI): ICD-10-CM

## 2025-01-06 PROBLEM — R05.3 CHRONIC COUGH: Status: RESOLVED | Noted: 2024-11-18 | Resolved: 2025-01-06

## 2025-01-06 PROBLEM — G44.209 TENSION TYPE HEADACHE: Status: RESOLVED | Noted: 2023-09-19 | Resolved: 2025-01-06

## 2025-01-06 PROBLEM — R21 RASH AND OTHER NONSPECIFIC SKIN ERUPTION: Status: RESOLVED | Noted: 2024-11-18 | Resolved: 2025-01-06

## 2025-01-06 PROBLEM — Z20.822 CONTACT WITH AND (SUSPECTED) EXPOSURE TO COVID-19: Status: RESOLVED | Noted: 2022-12-09 | Resolved: 2025-01-06

## 2025-01-06 PROCEDURE — 99396 PREV VISIT EST AGE 40-64: CPT | Performed by: FAMILY MEDICINE

## 2025-01-06 PROCEDURE — 3008F BODY MASS INDEX DOCD: CPT | Performed by: FAMILY MEDICINE

## 2025-01-06 ASSESSMENT — PAIN SCALES - GENERAL: PAINLEVEL_OUTOF10: 2

## 2025-01-06 ASSESSMENT — LIFESTYLE VARIABLES
HOW MANY STANDARD DRINKS CONTAINING ALCOHOL DO YOU HAVE ON A TYPICAL DAY: 1 OR 2
HOW OFTEN DO YOU HAVE SIX OR MORE DRINKS ON ONE OCCASION: NEVER
AUDIT-C TOTAL SCORE: 1
HOW OFTEN DO YOU HAVE A DRINK CONTAINING ALCOHOL: MONTHLY OR LESS
SKIP TO QUESTIONS 9-10: 1

## 2025-01-06 ASSESSMENT — PATIENT HEALTH QUESTIONNAIRE - PHQ9
SUM OF ALL RESPONSES TO PHQ9 QUESTIONS 1 AND 2: 0
2. FEELING DOWN, DEPRESSED OR HOPELESS: NOT AT ALL
1. LITTLE INTEREST OR PLEASURE IN DOING THINGS: NOT AT ALL

## 2025-01-06 NOTE — PROGRESS NOTES
History Of Present Illness  Mikey Salinas is a 59 y.o. male presenting for Annual Exam  .    Health maintenance: Vaccinations are up-to-date.  Last colonoscopy September 2023 with 5-year recall recommended.  Labs were done prior to this visit.    He has arthroscopic surgery of the shoulder with Dr. Garcia in February.  He is seeing cardiology but has not had any other palpitations or dyspnea.  Cardiac workup with a Holter monitor and TTE have been unremarkable.  This is given him much relief         Past Medical History  Patient Active Problem List    Diagnosis Date Noted    Abdominal bloating 11/18/2024    History of hypercoagulable state 11/18/2024    Pain in joint of right shoulder 06/03/2024    Arthrosis of right acromioclavicular joint 06/03/2024    Biceps strain, right, subsequent encounter 06/03/2024    Rotator cuff strain, right, subsequent encounter 06/03/2024    Sprain of right shoulder, subsequent encounter 06/03/2024    Tear of right supraspinatus tendon 06/03/2024    Tear of biceps tendon 06/03/2024    Tear of right glenoid labrum 06/03/2024    Tear of left supraspinatus tendon 06/03/2024    Impingement of left shoulder 06/03/2024    Shoulder pain 05/30/2024    Chronic bilateral low back pain 12/19/2023    Gastroesophageal reflux disease with hiatal hernia 12/04/2023    History of pulmonary embolism 10/19/2023    History of deep venous thrombosis (DVT) of distal vein of left lower extremity 10/19/2023    Anxiety 09/19/2023    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    Hyperlipidemia 09/19/2023    Lipoma 09/19/2023    Obesity 09/19/2023    Pityriasis rosea 09/19/2023    Scoliosis 09/19/2023    Age-related nuclear cataract of both eyes 07/20/2023    Unspecified rotator cuff tear or rupture of left shoulder, not specified as traumatic 12/13/2024    Strain of muscle, fascia and tendon of other parts of biceps, unspecified arm, initial encounter 12/13/2024    Unspecified rotator cuff tear or rupture of right  shoulder, not specified as traumatic 12/13/2024        Medications  Current Outpatient Medications   Medication Sig Dispense Refill    acetaminophen (TylenoL) 325 mg tablet Take by mouth.      chlordiazepoxide-clidinium (Librax) 5-2.5 mg capsule Take 4 capsules by mouth 4 times a day before meals.      fluticasone (Flonase Allergy Relief) 50 mcg/actuation nasal spray Administer 1 spray into each nostril once daily.      L.acid/L.casei/B.bif/B.kenroy/FOS (PROBIOTIC BLEND ORAL) Take by mouth.      Xarelto 20 mg tablet TAKE 1 TABLET DAILY 90 tablet 3    diclofenac sodium (Voltaren) 1 % gel gel Apply topically.      metoprolol succinate XL (Toprol-XL) 25 mg 24 hr tablet Take 1 tablet (25 mg) by mouth once daily. Do not crush or chew. 30 tablet 11     No current facility-administered medications for this visit.        Surgical History  He has a past surgical history that includes Knee arthroscopy w/ debridement (02/02/2015); MR angio head wo IV contrast (12/15/2016); Lipoma resection (N/A, 09/2023); [place] tooth extraction (1982); Colonoscopy (10/2023); and Upper gastrointestinal endoscopy (7/2023).     Social History  He reports that he has never smoked. He has never been exposed to tobacco smoke. He has never used smokeless tobacco. He reports current alcohol use of about 1.0 standard drink of alcohol per week. He reports that he does not use drugs.    Family History  Family History   Problem Relation Name Age of Onset    Macular degeneration Mother Megha     Osteoporosis Mother Megha     Transient ischemic attack Mother Megha     Brain Aneurysm Mother Megha     Hypertension Mother Megha     Other (Heart valve surgery) Father Ray     Other (RHEUMATIC HEART DISEASE) Father Ray     Nephrolithiasis Father Ray     Heart disease Father Ray     Hypertension Father Ray     Breast cancer Sister Peg 58    Irritable bowel syndrome Sister Peg     ROGER disease Sister Peg     Hypertension Sister Peg     Cancer Sister Peg     Prostate  "cancer Brother      Cancer Brother Yves Salinas     Cancer Other GRAND FATHER         Allergies  Chocolate flavor, Cocoa, and Penicillins    Immunizations  Immunization History   Administered Date(s) Administered    COVID-19, mRNA, LNP-S, PF, 30 mcg/0.3 mL dose 03/24/2021, 04/14/2021, 12/13/2021    Flu vaccine (IIV4), preservative free *Check age/dose* 09/25/2018, 10/19/2023    Flu vaccine, quadrivalent, no egg protein, age 6 month or greater (FLUCELVAX) 10/11/2021, 10/13/2022    Flu vaccine, trivalent, preservative free, age 6 months and greater (Fluarix/Fluzone/Flulaval) 11/11/2024    Hepatitis A vaccine, age 19 years and greater (HAVRIX) 06/08/2022    Hepatitis A vaccine, pediatric/adolescent (HAVRIX, VAQTA) 07/03/2009, 08/03/2009    Hepatitis B vaccine, 19 yrs and under (RECOMBIVAX, ENGERIX) 07/03/2009, 08/03/2009    Influenza, injectable, MDCK, quadrivalent 11/01/2017, 10/10/2018, 12/09/2019    Influenza, seasonal, injectable 07/03/2009, 09/21/2016    MMR vaccine, subcutaneous (MMR II) 07/03/2009    Pfizer COVID-19 vaccine, 12 years and older, (30mcg/0.3mL) (Comirnaty) 11/11/2024    Pfizer COVID-19 vaccine, bivalent, age 12 years and older (30 mcg/0.3 mL) 12/17/2022    Pfizer Gray Cap SARS-CoV-2 06/15/2022    Poliovirus vaccine, subcutaneous (IPOL) 07/03/2009    Tdap vaccine, age 7 year and older (BOOSTRIX, ADACEL) 07/03/2009, 01/30/2018, 06/08/2022    Typhoid, Parenteral 07/01/2009    Typhoid, ViCPs 06/08/2022    Zoster vaccine, recombinant, adult (SHINGRIX) 10/15/2021, 02/24/2022        ROS  Negative, except as discussed in HPI     Vitals  /80   Pulse 71   Temp 36.6 °C (97.8 °F)   Ht 1.778 m (5' 10\")   Wt 103 kg (227 lb 6.4 oz)   SpO2 96%   BMI 32.63 kg/m²      Physical Exam  Vitals and nursing note reviewed.   Constitutional:       Appearance: Normal appearance.   HENT:      Head: Normocephalic.      Right Ear: Tympanic membrane normal.      Left Ear: Tympanic membrane normal.      Nose: Nose " normal.      Mouth/Throat:      Mouth: Mucous membranes are moist.   Eyes:      Extraocular Movements: Extraocular movements intact.      Conjunctiva/sclera: Conjunctivae normal.      Pupils: Pupils are equal, round, and reactive to light.   Cardiovascular:      Rate and Rhythm: Normal rate and regular rhythm.      Heart sounds: Normal heart sounds.   Pulmonary:      Effort: Pulmonary effort is normal. No respiratory distress.      Breath sounds: Normal breath sounds.   Abdominal:      General: Abdomen is flat.      Palpations: Abdomen is soft.      Tenderness: There is no abdominal tenderness.   Musculoskeletal:      Cervical back: Neck supple.   Lymphadenopathy:      Cervical: No cervical adenopathy.   Skin:     General: Skin is warm and dry.      Findings: No rash.   Neurological:      General: No focal deficit present.      Mental Status: He is alert. Mental status is at baseline.      Coordination: Coordination normal.      Gait: Gait normal.      Deep Tendon Reflexes: Reflexes normal.   Psychiatric:         Mood and Affect: Mood normal.         Behavior: Behavior normal.         Relevant Results  Lab Results   Component Value Date    WBC 7.3 08/28/2024    WBC 6.1 08/19/2024    HGB 15.2 08/28/2024    HGB 15.0 08/19/2024    HCT 46.0 08/28/2024    HCT 45.0 08/19/2024    MCV 89 08/28/2024    MCV 88 08/19/2024     08/28/2024     08/19/2024     Lab Results   Component Value Date     08/28/2024     08/19/2024    K 4.1 08/28/2024    K 4.3 08/19/2024     (H) 08/28/2024     (H) 08/19/2024    CO2 25 08/28/2024    CO2 24 08/19/2024    BUN 18 08/28/2024    BUN 22 08/19/2024    CREATININE 1.00 08/28/2024    CREATININE 1.00 08/19/2024    CALCIUM 9.3 08/28/2024    CALCIUM 9.4 08/19/2024    PROT 6.5 08/28/2024    PROT 6.5 08/19/2024    PROT 6.3 (L) 08/19/2024    BILITOT 0.4 08/28/2024    BILITOT 0.5 08/19/2024    ALKPHOS 68 08/28/2024    ALKPHOS 73 08/19/2024    ALT 20 08/28/2024    ALT 20  08/19/2024    AST 17 08/28/2024    AST 14 08/19/2024    GLUCOSE 94 08/28/2024    GLUCOSE 102 (H) 08/19/2024     Lab Results   Component Value Date    HGBA1C 5.9 (H) 08/19/2024     Lab Results   Component Value Date    TSH 0.57 08/28/2024      Lab Results   Component Value Date    CHOL 196 12/31/2024    TRIG 61 12/31/2024    HDL 51.6 12/31/2024           Assessment/Plan   Mikey was seen today for annual exam.  Diagnoses and all orders for this visit:  Annual physical exam (Primary)  Comments:  UTD  Factor II deficiency (Multi)  Comments:  on anticoagulation         Counseling:   Medication education:   -Education:  The patient is counseled regarding potential side-effects of any and all new medications  -Understanding:  Patient expressed understanding of information discussed today  -Adherence:  No barriers to adherence identified    Final treatment plan is a result of shared decision making with patient.         Felipe Salcedo MD

## 2025-02-03 ENCOUNTER — LAB (OUTPATIENT)
Dept: LAB | Facility: HOSPITAL | Age: 60
End: 2025-02-03
Payer: COMMERCIAL

## 2025-02-03 ENCOUNTER — PRE-ADMISSION TESTING (OUTPATIENT)
Dept: PREADMISSION TESTING | Facility: HOSPITAL | Age: 60
End: 2025-02-03
Payer: COMMERCIAL

## 2025-02-03 VITALS
DIASTOLIC BLOOD PRESSURE: 93 MMHG | SYSTOLIC BLOOD PRESSURE: 137 MMHG | RESPIRATION RATE: 18 BRPM | TEMPERATURE: 98.2 F | OXYGEN SATURATION: 96 % | HEART RATE: 66 BPM | HEIGHT: 70 IN | WEIGHT: 224 LBS | BODY MASS INDEX: 32.07 KG/M2

## 2025-02-03 DIAGNOSIS — M75.101 UNSPECIFIED ROTATOR CUFF TEAR OR RUPTURE OF RIGHT SHOULDER, NOT SPECIFIED AS TRAUMATIC: ICD-10-CM

## 2025-02-03 DIAGNOSIS — Z01.818 PREOP TESTING: Primary | ICD-10-CM

## 2025-02-03 DIAGNOSIS — M75.102 UNSPECIFIED ROTATOR CUFF TEAR OR RUPTURE OF LEFT SHOULDER, NOT SPECIFIED AS TRAUMATIC: ICD-10-CM

## 2025-02-03 DIAGNOSIS — S46.219A STRAIN OF MUSCLE, FASCIA AND TENDON OF OTHER PARTS OF BICEPS, UNSPECIFIED ARM, INITIAL ENCOUNTER: ICD-10-CM

## 2025-02-03 LAB
ALBUMIN SERPL BCP-MCNC: 4.1 G/DL (ref 3.4–5)
ALP SERPL-CCNC: 58 U/L (ref 33–136)
ALT SERPL W P-5'-P-CCNC: 19 U/L (ref 10–52)
ANION GAP SERPL CALC-SCNC: 10 MMOL/L (ref 10–20)
AST SERPL W P-5'-P-CCNC: 15 U/L (ref 9–39)
BASOPHILS # BLD AUTO: 0.08 X10*3/UL (ref 0–0.1)
BASOPHILS NFR BLD AUTO: 1.3 %
BILIRUB SERPL-MCNC: 0.8 MG/DL (ref 0–1.2)
BUN SERPL-MCNC: 17 MG/DL (ref 6–23)
CALCIUM SERPL-MCNC: 9.3 MG/DL (ref 8.6–10.3)
CHLORIDE SERPL-SCNC: 108 MMOL/L (ref 98–107)
CO2 SERPL-SCNC: 28 MMOL/L (ref 21–32)
CREAT SERPL-MCNC: 1 MG/DL (ref 0.5–1.3)
EGFRCR SERPLBLD CKD-EPI 2021: 86 ML/MIN/1.73M*2
EOSINOPHIL # BLD AUTO: 0.18 X10*3/UL (ref 0–0.7)
EOSINOPHIL NFR BLD AUTO: 2.8 %
ERYTHROCYTE [DISTWIDTH] IN BLOOD BY AUTOMATED COUNT: 12.6 % (ref 11.5–14.5)
GLUCOSE SERPL-MCNC: 88 MG/DL (ref 74–99)
HCT VFR BLD AUTO: 47.9 % (ref 41–52)
HGB BLD-MCNC: 15.6 G/DL (ref 13.5–17.5)
IMM GRANULOCYTES # BLD AUTO: 0.02 X10*3/UL (ref 0–0.7)
IMM GRANULOCYTES NFR BLD AUTO: 0.3 % (ref 0–0.9)
LYMPHOCYTES # BLD AUTO: 1.76 X10*3/UL (ref 1.2–4.8)
LYMPHOCYTES NFR BLD AUTO: 27.8 %
MCH RBC QN AUTO: 28.6 PG (ref 26–34)
MCHC RBC AUTO-ENTMCNC: 32.6 G/DL (ref 32–36)
MCV RBC AUTO: 88 FL (ref 80–100)
MONOCYTES # BLD AUTO: 0.54 X10*3/UL (ref 0.1–1)
MONOCYTES NFR BLD AUTO: 8.5 %
NEUTROPHILS # BLD AUTO: 3.75 X10*3/UL (ref 1.2–7.7)
NEUTROPHILS NFR BLD AUTO: 59.3 %
NRBC BLD-RTO: NORMAL /100{WBCS}
PLATELET # BLD AUTO: 235 X10*3/UL (ref 150–450)
POTASSIUM SERPL-SCNC: 4.7 MMOL/L (ref 3.5–5.3)
PROT SERPL-MCNC: 6.4 G/DL (ref 6.4–8.2)
RBC # BLD AUTO: 5.45 X10*6/UL (ref 4.5–5.9)
SODIUM SERPL-SCNC: 141 MMOL/L (ref 136–145)
WBC # BLD AUTO: 6.3 X10*3/UL (ref 4.4–11.3)

## 2025-02-03 PROCEDURE — 99204 OFFICE O/P NEW MOD 45 MIN: CPT | Performed by: NURSE PRACTITIONER

## 2025-02-03 PROCEDURE — 80053 COMPREHEN METABOLIC PANEL: CPT

## 2025-02-03 PROCEDURE — 85025 COMPLETE CBC W/AUTO DIFF WBC: CPT

## 2025-02-03 PROCEDURE — 36415 COLL VENOUS BLD VENIPUNCTURE: CPT

## 2025-02-03 ASSESSMENT — DUKE ACTIVITY SCORE INDEX (DASI)
CAN YOU TAKE CARE OF YOURSELF (EAT, DRESS, BATHE, OR USE TOILET): YES
CAN YOU DO HEAVY WORK AROUND THE HOUSE LIKE SCRUBBING FLOORS OR LIFTING AND MOVING HEAVY FURNITURE: YES
CAN YOU DO LIGHT WORK AROUND THE HOUSE LIKE DUSTING OR WASHING DISHES: YES
CAN YOU WALK A BLOCK OR TWO ON LEVEL GROUND: YES
CAN YOU HAVE SEXUAL RELATIONS: YES
CAN YOU DO YARD WORK LIKE RAKING LEAVES, WEEDING OR PUSHING A MOWER: YES
CAN YOU DO MODERATE WORK AROUND THE HOUSE LIKE VACUUMING, SWEEPING FLOORS OR CARRYING GROCERIES: YES
CAN YOU PARTICIPATE IN STRENOUS SPORTS LIKE SWIMMING, SINGLES TENNIS, FOOTBALL, BASKETBALL, OR SKIING: NO
DASI METS SCORE: 9
CAN YOU RUN A SHORT DISTANCE: YES
CAN YOU WALK INDOORS, SUCH AS AROUND YOUR HOUSE: YES
CAN YOU CLIMB A FLIGHT OF STAIRS OR WALK UP A HILL: YES
TOTAL_SCORE: 50.7
CAN YOU PARTICIPATE IN MODERATE RECREATIONAL ACTIVITIES LIKE GOLF, BOWLING, DANCING, DOUBLES TENNIS OR THROWING A BASEBALL OR FOOTBALL: YES

## 2025-02-03 ASSESSMENT — PAIN DESCRIPTION - DESCRIPTORS: DESCRIPTORS: ACHING

## 2025-02-03 ASSESSMENT — PAIN - FUNCTIONAL ASSESSMENT: PAIN_FUNCTIONAL_ASSESSMENT: 0-10

## 2025-02-03 ASSESSMENT — PAIN SCALES - GENERAL: PAINLEVEL_OUTOF10: 3

## 2025-02-03 NOTE — H&P (VIEW-ONLY)
CPM/PAT Evaluation       Name: Mikey Salinas (Mikey Salinas)  /Age: 1965/59 y.o.     In-Person       Chief Complaint: Unspecified rotator cuff tear or rupture of left shoulder, Strain of muscle, fascia and tendon of other parts of biceps,     HPI  Patient is an alert and oriented 59 year old scheduled for a  ARTHROSCOPY, SHOULDER, WITH ROTATOR CUFF REPAIR  on 25 with Dr. Dumont for Unspecified rotator cuff tear or rupture of left shoulder,  Strain of muscle, fascia and tendon of other parts of biceps . PMHX includes bursitis, factor II, DVT, PE. Presents to [facility] PAT today for perioperative risk stratification and optimization.     Past Medical History:   Diagnosis Date    Bursitis of shoulder     Clotting disorder (Multi)     Colon polyp 10/2023    1 tiny one removed.    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    History of deep venous thrombosis (DVT) of distal vein of left lower extremity 10/19/2023    2014    History of pulmonary embolism 10/19/2023    2014    HL (hearing loss) 2023    Tenitus    Hyperlipidemia 2023    Irritable bowel syndrome 2023    Other pulmonary embolism without acute cor pulmonale 2015    Pulmonary embolism    Personal history of diseases of the blood and blood-forming organs and certain disorders involving the immune mechanism     History of hypercoagulable state    Personal history of other venous thrombosis and embolism     History of deep venous thrombosis    Scoliosis 1978    Tension type headache 2023    Traumatic tear of supraspinatus tendon of right shoulder     left shoulder as well       Past Surgical History:   Procedure Laterality Date    COLONOSCOPY  10/2023    KNEE ARTHROSCOPY W/ DEBRIDEMENT  2015    Arthroscopy Knee Left    LIPOMA RESECTION N/A 2023    multiple abdominal lipoma's excised    MR HEAD ANGIO WO IV CONTRAST   12/15/2016    MR HEAD ANGIO WO IV CONTRAST [facility]    UPPER GASTROINTESTINAL ENDOSCOPY  7/2023    WISDOM TOOTH EXTRACTION  1982       Patient  reports being sexually active and has had partner(s) who are female. He reports using the following method of birth control/protection: None.    Family History   Problem Relation Name Age of Onset    Macular degeneration Mother Megha     Osteoporosis Mother Megha     Transient ischemic attack Mother Megha     Brain Aneurysm Mother Megha     Hypertension Mother Megha     Other (Heart valve surgery) Father Ray     Other (RHEUMATIC HEART DISEASE) Father Ray     Nephrolithiasis Father Ray     Heart disease Father Ray     Hypertension Father Ray     Breast cancer Sister Peg 58    Irritable bowel syndrome Sister Peg     ROGER disease Sister Peg     Hypertension Sister Peg     Cancer Sister Peg     Prostate cancer Brother      Cancer Brother Yves Salinas     Cancer Other GRAND FATHER        Allergies   Allergen Reactions    Chocolate Flavor GI Upset    Cocoa Nausea/vomiting    Penicillins Rash       Prior to Admission medications    Medication Sig Start Date End Date Taking? Authorizing Provider   acetaminophen (TylenoL) 325 mg tablet Take by mouth.   Yes Historical Provider, MD   chlordiazepoxide-clidinium (Librax) 5-2.5 mg capsule Take 4 capsules by mouth 4 times a day before meals. 8/30/24  Yes Historical Provider, MD   diclofenac sodium (Voltaren) 1 % gel gel Apply topically. 2/14/23  Yes Historical Provider, MD   L.acid/L.casei/B.bif/B.kenroy/FOS (PROBIOTIC BLEND ORAL) Take by mouth.   Yes Historical Provider, MD   NON FORMULARY Move free   Yes Historical Provider, MD   Xarelto 20 mg tablet TAKE 1 TABLET DAILY 11/18/24  Yes Felipe Salcedo MD   fluticasone (Flonase Allergy Relief) 50 mcg/actuation nasal spray Administer 1 spray into each nostril once daily.  Patient not taking: Reported on 2/3/2025    Historical Provider, MD   metoprolol succinate XL (Toprol-XL) 25 mg 24 hr  tablet Take 1 tablet (25 mg) by mouth once daily. Do not crush or chew.  Patient not taking: Reported on 2/3/2025 9/3/24 9/3/25  Rodo Kaur MD       Review of Systems   Constitutional: Negative for chills, decreased appetite, diaphoresis, fever and malaise/fatigue.   Eyes:  Negative for blurred vision and double vision.   Cardiovascular:  Negative for chest pain, claudication, cyanosis, dyspnea on exertion, irregular heartbeat, leg swelling, near-syncope and palpitations.   Respiratory:  Negative for cough, hemoptysis, shortness of breath and wheezing.    Endocrine: Negative for cold intolerance, heat intolerance, polydipsia, polyphagia and polyuria.   Gastrointestinal:  Negative for abdominal pain, constipation, diarrhea, dysphagia, nausea and vomiting.   Genitourinary:  Negative for bladder incontinence, dysuria, hematuria, incomplete emptying, nocturia, frequency, pelvic pain and urgency.   Neurological:  Negative for headaches, light-headedness, paresthesias, sensory change and weakness.   Psychiatric/Behavioral:  Negative for altered mental status.    Musculoskeletal: positive for myalgias, arthralgias     Vitals and nursing note reviewed.     Physical exam  Constitutional:       Appearance: Normal appearance. He is Obese.   HENT:      Head: Normocephalic and atraumatic.      Mouth/Throat:      Mouth: Mucous membranes are moist.      Pharynx: Oropharynx is clear.   Eyes:      Extraocular Movements: Extraocular movements intact.      Conjunctiva/sclera: Conjunctivae normal.      Pupils: Pupils are equal, round, and reactive to light.   Cardiovascular:      PMI at left midclavicular line. Normal rate. Regular rhythm. Normal S1. Normal S2.       Murmurs: There is no murmur.      No gallop.  No click. No rub.       No audible carotid bruit     No lower extremity edema on exam  Pulmonary:      Effort: Pulmonary effort is normal.      Breath sounds: Normal breath sounds.   Abdominal:      General: Abdomen is  flat. Bowel sounds are normal.      Palpations: Abdomen is soft and non-tender  Musculoskeletal:      Cervical back: Normal range of motion and neck supple.   Skin:     General: Skin is warm and dry.      Capillary Refill: Capillary refill takes less than 2 seconds.   Neurological:      General: No focal deficit present.      Mental Status: He is alert and oriented to person, place, and time. Mental status is at baseline.   Psychiatric:         Mood and Affect: Mood normal.         Behavior: Behavior normal.         Thought Content: Thought content normal.         Judgment: Judgment normal.     Vitals and nursing note reviewed. Physical exam within normal limits.         DASI Risk Score      Flowsheet Row Pre-Admission Testing from 2/3/2025 in [facility] Questionnaire Series Submission from 1/5/2025 in [facility] with Generic Provider Yaya   Can you take care of yourself (eat, dress, bathe, or use toilet)?  2.75 filed at 02/03/2025 0951 2.75  filed at 01/05/2025 1934   Can you walk indoors, such as around your house? 1.75 filed at 02/03/2025 0951 1.75  filed at 01/05/2025 1934   Can you walk a block or two on level ground?  2.75 filed at 02/03/2025 0951 2.75  filed at 01/05/2025 1934   Can you climb a flight of stairs or walk up a hill? 5.5 filed at 02/03/2025 0951 5.5  filed at 01/05/2025 1934   Can you run a short distance? 8 filed at 02/03/2025 0951 8  filed at 01/05/2025 1934   Can you do light work around the house like dusting or washing dishes? 2.7 filed at 02/03/2025 0951 2.7  filed at 01/05/2025 1934   Can you do moderate work around the house like vacuuming, sweeping floors or carrying groceries? 3.5 filed at 02/03/2025 0951 3.5  filed at 01/05/2025 1934   Can you do heavy work around the house like scrubbing floors or lifting and moving heavy furniture?  8 filed at 02/03/2025 0951 8  filed at 01/05/2025 1934   Can you do yard work like raking leaves, weeding or pushing a mower? 4.5  filed at 02/03/2025 0951 4.5  filed at 01/05/2025 1934   Can you have sexual relations? 5.25 filed at 02/03/2025 0951 5.25  filed at 01/05/2025 1934   Can you participate in moderate recreational activities like golf, bowling, dancing, doubles tennis or throwing a baseball or football? 6 filed at 02/03/2025 0951 6  filed at 01/05/2025 1934   Can you participate in strenous sports like swimming, singles tennis, football, basketball, or skiing? 0 filed at 02/03/2025 0951 0  filed at 01/05/2025 1934   DASI SCORE 50.7 filed at 02/03/2025 0951 50.7  filed at 01/05/2025 1934   METS Score (Will be calculated only when all the questions are answered) 9 filed at 02/03/2025 0951 9  filed at 01/05/2025 1934          Capvalenciai DVT Assessment      Flowsheet Row Pre-Admission Testing from 2/3/2025 in [facility]   DVT Score (IF A SCORE IS NOT CALCULATING, MUST SELECT A BMI TO COMPLETE) 13 filed at 02/03/2025 0948   Medical Factors History of DVT/PE filed at 02/03/2025 0948   Surgical Factors Major surgery planned, including arthroscopic and laproscopic (1-2 hours) filed at 02/03/2025 0948   Labs/Test Results Positive Prothrombin Gene Mutation (45107X) filed at 02/03/2025 0948   BMI (BMI MUST BE CHOSEN) 31-40 (Obesity) filed at 02/03/2025 0948          Modified Frailty Index    No data to display       CHADS2 Stroke Risk  Current as of 19 minutes ago        N/A 3 to 100%: High Risk   2 to < 3%: Medium Risk   0 to < 2%: Low Risk     Last Change: N/A          This score determines the patient's risk of having a stroke if the patient has atrial fibrillation.        This score is not applicable to this patient. Components are not calculated.          Revised Cardiac Risk Index      Flowsheet Row Pre-Admission Testing from 2/3/2025 in [facility]   High-Risk Surgery (Intraperitoneal, Intrathoracic,Suprainguinal vascular) 0 filed at 02/03/2025 0951   History of ischemic heart disease (History of MI, History  of positive exercuse test, Current chest paint considered due to myocardial ischemia, Use of nitrate therapy, ECG with pathological Q Waves) 0 filed at 02/03/2025 0951   History of congestive heart failure (pulmonary edemia, bilateral rales or S3 gallop, Paroxysmal nocturnal dyspnea, CXR showing pulmonary vascular redistribution) 0 filed at 02/03/2025 0951   History of cerebrovascular disease (Prior TIA or stroke) 0 filed at 02/03/2025 0951   Pre-operative insulin treatment 0 filed at 02/03/2025 0951   Pre-operative creatinine>2 mg/dl 0 filed at 02/03/2025 0951   Revised Cardiac Risk Calculator 0 filed at 02/03/2025 0951          Apfel Simplified Score    No data to display       Risk Analysis Index Results This Encounter    No data found in the last 10 encounters.       Stop Bang Score      Flowsheet Row Pre-Admission Testing from 2/3/2025 in [facility] Questionnaire Series Submission from 1/5/2025 in [facility] with Generic Provider Yaya   Do you snore loudly? 0 filed at 02/03/2025 0912 0  filed at 01/05/2025 1934   Do you often feel tired or fatigued after your sleep? 0 filed at 02/03/2025 0912 0  filed at 01/05/2025 1934   Has anyone ever observed you stop breathing in your sleep? 0 filed at 02/03/2025 0912 0  filed at 01/05/2025 1934   Do you have or are you being treated for high blood pressure? 0 filed at 02/03/2025 0912 0  filed at 01/05/2025 1934   Recent BMI (Calculated) 32.1 filed at 02/03/2025 0912 32.1  filed at 01/05/2025 1934   Is BMI greater than 35 kg/m2? 0=No filed at 02/03/2025 0912 0=No  filed at 01/05/2025 1934   Age older than 50 years old? 1=Yes filed at 02/03/2025 0912 1=Yes  filed at 01/05/2025 1934   Is your neck circumference greater than 17 inches (Male) or 16 inches (Female)? 0 filed at 02/03/2025 0912 --   Gender - Male 1=Yes filed at 02/03/2025 0912 1=Yes  filed at 01/05/2025 1934   STOP-BANG Total Score 2 filed at 02/03/2025 0912 --          Prodigy: High Risk   Total Score: 16              Prodigy Age Score      Prodigy Gender Score          ARISCAT Score for Postoperative Pulmonary Complications      Flowsheet Row Pre-Admission Testing from 2/3/2025 in [facility]   Age Calculated Score 3 filed at 02/03/2025 0952   Preoperative SpO2 0 filed at 02/03/2025 0952   Respiratory infection in the last month Either upper or lower (i.e., URI, bronchitis, pneumonia), with fever and antibiotic treatment 0 filed at 02/03/2025 0952   Preoperative anemia (Hgb less than 10 g/dl) 0 filed at 02/03/2025 0952   Surgical incision  0 filed at 02/03/2025 0952   Duration of surgery  0 filed at 02/03/2025 0952   Emergency Procedure  0 filed at 02/03/2025 0952   ARISCAT Total Score  3 filed at 02/03/2025 0952          Annel Perioperative Risk for Myocardial Infarction or Cardiac Arrest (TERRANCE)      Flowsheet Row Pre-Admission Testing from 2/3/2025 in [facility]   Calculated Age Score 1.2 filed at 02/03/2025 0951   Functional Status  0 filed at 02/03/2025 0951   ASA Class  -1.92 filed at 02/03/2025 0951   Creatinine 0 filed at 02/03/2025 0951   Type of Procedure  0.80 filed at 02/03/2025 0951   TERRANCE Total Score  -5.17 filed at 02/03/2025 0951   TERRANCE % 0.57 filed at 02/03/2025 0951              Assessment & Plan:    Neuro:  No diagnosis or significant findings on chart review or clinical presentation and evaluation.     HEENT/Airway:  No diagnosis or significant findings on chart review or clinical presentation and evaluation.   STOP-BANG Score- 3 points moderaterisk for ODT    Mallampati::  II    TM distance::  >3 FB    Neck ROM::  Full  Dentures-denies  Crowns-reports  Implants-denies    Cardiovascular:  HLD, HTN (metoprolol)   METS: 9  RCRI: 0 points, 3.9%  risk for postoperative MACE   TERRANCE: 0.57% risk for postoperative MACE  EKG -normal sinus rhythm Rate- No acute changes.   TTE 12/4/24   1. The left ventricular systolic function is low normal, with a visually  estimated ejection fraction of 50-55%.   2. There is normal right ventricular global systolic function.   3. No evidence of mitral valve regurgitation.   4. Mild tricuspid regurgitation is visualized.    Pulmonary:  No diagnosis or significant findings on chart review or clinical presentation and evaluation.   ARISCAT: <26 points, 1.6% risk of in-hospital postoperative pulmonary complication  PRODIGY: Low risk for opioid induced respiratory depression  Smoking History-He has never smoked.  Discussed smoking cessation and deep breathing handout given    Renal/Urinary:  No diagnosis or significant findings on chart review or clinical presentation and evaluation, however, the patient is at increased risk of perioperative renal complications secondary to male sex. Preventative measures include BP monitoring, medication compliance, and hydration management.   CMP-Pending  Creatinine-  GFR-    Endocrine:  No diagnosis or significant findings on chart review or clinical presentation and evaluation.   [id number]    Hematologic/Immunology:  Factor II deficiency   Hx of DVT/PE - on xarelto   The patient is not a Jehovah’s witness and will accept blood and blood products if medically indicated.   History of previous blood transfusions No  CBC-Pending  HGB-Pending  Caprini Score 13, patient at High for postoperative DVT. Pt supplied education/VTE handout  Anticoagulation use: Yes     Gastrointestinal:   GERD, IBS (Librax)  Recreational drug use: none  Alcohol use 1 glasses of wine per week    Infectious disease:   No diagnosis or significant findings on chart review or clinical presentation and evaluation.     Musculoskeletal:   Shoulder pain   JHFRAT score-4  points. low risk for falls    Anesthesia:  ASA 3 - Patient with moderate systemic disease with functional limitations  Anticipated anesthesia-general  History of General anesthesia- yes  Complications- No anesthesia complications  No family history of anesthesia  complications    Abnormalities noted on PAT evaluation: No    Labs & Imaging ordered:  Cbc, cmp  Nickel/metal allergy-negative  Shellfish allergy-negative    Discussed with patient medication instructions, NPO guidelines, and any questions or concerns.   Will need instructions from Dr. Kim for Xarelto - pt has Factor II  Pt is ok to stop Xarelto 1 day prior to surgery and resume 1 day after surgery. Patient called and is aware    time spent 45 minutes

## 2025-02-03 NOTE — PREPROCEDURE INSTRUCTIONS
Medication List            Accurate as of February 3, 2025  9:23 AM. Always use your most recent med list.                chlordiazepoxide-clidinium 5-2.5 mg capsule  Commonly known as: Librax  Medication Adjustments for Surgery: Take/Use as prescribed     Flonase Allergy Relief 50 mcg/actuation nasal spray  Generic drug: fluticasone  Medication Adjustments for Surgery: Take/Use as prescribed     metoprolol succinate XL 25 mg 24 hr tablet  Commonly known as: Toprol-XL  Take 1 tablet (25 mg) by mouth once daily. Do not crush or chew.  Medication Adjustments for Surgery: Take/Use as prescribed     NON FORMULARY  Medication Adjustments for Surgery: Take/Use as prescribed     PROBIOTIC BLEND ORAL  Additional Medication Adjustments for Surgery: Take last dose 7 days before surgery  Notes to patient: Last dose preoperatively on 2/9/25     TylenoL 325 mg tablet  Generic drug: acetaminophen  Medication Adjustments for Surgery: Take/Use as prescribed     Voltaren 1 % gel  Generic drug: diclofenac sodium  Additional Medication Adjustments for Surgery: Take last dose 7 days before surgery  Notes to patient: Last dose preoperatively on 2/9/25     Xarelto 20 mg tablet  Generic drug: rivaroxaban  TAKE 1 TABLET DAILY  Additional Medication Adjustments for Surgery: Other (Comment)  Notes to patient: Will need instructions from your provider               NPO Instructions:     Do not eat any food after midnight the night before your surgery/procedure.  You may have clear liquids until TWO hours before surgery/procedure. This includes water, black tea/coffee, (no milk or cream) apple juice and electrolyte drinks (Gatorade).  You may chew gum up to TWO hours before your surgery/procedure.     Additional Instructions:      Seven/Six Days before Surgery:  Review your medication instructions, stop indicated medications  Five Days before Surgery:  Review your medication instructions, stop indicated medications  Three Days before  Surgery:  Review your medication instructions, stop indicated medications  The Day before Surgery:  No smoking or alcohol use 24 hours before surgery  Review your medication instructions, stop indicated medications  You will be contacted regarding the time of your arrival to facility and surgery time  Do not eat any food after Midnight  Day of Surgery:  Review your medication instructions, take indicated medications  If you have diabetes, please check your fasting blood sugar upon awakening.  If fasting blood sugar is <80 mg/dl, drink 100 ml of apple juice, time limit of 2 hours before  You may have clear liquids until TWO hours before surgery/procedure.  This includes water, black tea/coffee, (no milk or cream) apple juice and electrolyte drinks (Gatorade)  You may chew gum up to TWO hours before your surgery/procedure  Wear  comfortable loose fitting clothing  Do not use moisturizers, creams, lotions or perfume  All jewelry and valuables should be left at home     CONTACT SURGEON'S OFFICE IF YOU DEVELOP:  * Fever = 100.4 F   * New respiratory symptoms (e.g. cough, shortness of breath, respiratory distress, sore throat)  * Recent loss of taste or smell  *Flu like symptoms such as headache, fatigue or gastrointestinal symptoms  * You develop any open sores, shingles, burning or painful urination   AND/OR:  * You no longer wish to have the surgery.  * Any other personal circumstances change that may lead to the need to cancel or defer this surgery.  *You were admitted to any hospital within one week of your planned procedure.     SMOKING:  *Quitting smoking can make a huge difference to your health and recovery from surgery.    *If you need help with quitting, call 0-800-QUIT-NOW.     THE DAY BEFORE SURGERY:  *Do not eat any food after midnight the night before your surgery.   *You may have up to TEN OUNCES of clear liquids until TWO hours before your instructed ARRIVAL TIME to hospital. This includes water, black  tea/coffee, (no milk or cream) apple juice, clear broth and electrolyte drinks (Gatorade). Please avoid clear liquids that are red in color.   *You may chew gum/mints up to TWO hours before your surgery/procedure.     SURGICAL TIME:  *You will be contacted between 2 p.m. and 3 p.m. the business day before your surgery with your arrival time.  *If you haven't received a call by 3pm, call [phone]  *Scheduled surgery times may change and you will be notified if this occurs-check your personal voicemail for any updates.     ON THE MORNING OF SURGERY:  *Wear comfortable, loose fitting clothing.   *Do not use moisturizers, creams, lotions or perfume.  *All jewelry and valuables should be left at home.  *Prosthetic devices such as contact lenses, hearing aids, dentures, eyelash extensions, hairpins and body piercing must be removed before surgery.     BRING WITH YOU:  *Photo ID and insurance card  *Current list of medications and allergies  *Pacemaker/Defibrillator/Heart stent cards  *CPAP machine and mask  *Slings/splints/crutches  *Copy of your complete Advanced Directive/DHPOA-if applicable  *Neurostimulator implant remote     PARKING AND ARRIVAL:  *Check in at the Main Entrance desk and let them know you are here for surgery.     IF YOU ARE HAVING OUTPATIENT/SAME DAY SURGERY:  *A responsible adult MUST accompany you at the time of discharge and stay with you for 24 hours after your surgery.  *You may NOT drive yourself home after surgery.  *You may use a taxi or ride sharing service ([email or web address], Uber) to return home ONLY if you are accompanied by a friend or family member.  *Instructions for resuming your medications will be provided by your surgeon.     Thank you for coming to Pre Admission testing.      If I have prescribed medication please don't forget to  at your pharmacy.      Any questions about today's visit call [phone] and leave a message in the general mailbox.     Patient instructed to ambulate  as soon as possible postoperatively to decrease thromboembolic risk.     Mariajose Parrish APRN-CNP     Thank you for visiting [facility].  If you have any changes to your health condition, please call the surgeons office to alert them and give them details of your symptoms.        Preoperative Fasting Guidelines     Why must I stop eating and drinking near surgery time?  With sedation, food or liquid in your stomach can enter your lungs causing serious complications  Increases nausea and vomiting     When do I need to stop eating and drinking before my surgery?  Do not eat any food after midnight the night before your surgery/procedure.  You may have up to TEN ounces of clear liquid until TWO hours before your instructed arrival time to the hospital.  This includes water, black tea/coffee, (no milk or cream) apple juice, and electrolyte drinks (Gatorade)  You may chew gum until TWO hours before your surgery/procedure        Additional Instructions:      The Day before Surgery:  -Review your medication instructions, stop indicated medications  -You will be contacted in the evening regarding the time of your arrival to facility and surgery time     Day of Surgery:  -Review your medication instructions, take indicated medications  -Wear comfortable loose fitting clothing  -Do not use moisturizers, creams, lotions or perfume  -All jewelry and valuables should be left at home                   Preoperative Brain Exercises     What are brain exercises?  A brain exercise is any activity that engages your thinking (cognitive) skills.     What types of activities are considered brain exercises?  Jigsaw puzzles, crossword puzzles, word jumble, memory games, word search, and many more.  Many can be found free online or on your phone via a mobile ainsley.     Why should I do brain exercises before my surgery?  More recent research has shown brain exercise before surgery can lower the risk of postoperative  delirium (confusion) which can be especially important for older adults.  Patients who did brain exercises for 5 to 10 minutes/day in the days before surgery, cut their risk of postoperative delirium in half up to 1 week after surgery.                         [facility]     Preoperative Deep Breathing Exercises     Why it is important to do deep breathing exercises before my surgery?  Deep breathing exercises strengthen your breathing muscles.  This helps you to recover after your surgery and decreases the chance of breathing complications.        How are the deep breathing exercises done?  Sit straight with your back supported.  Breathe in deeply and slowly through your nose. Your lower rib cage should expand and your abdomen may move forward.  Hold that breath for 3 to 5 seconds.  Breathe out through pursed lips, slowly and completely.  Rest and repeat 10 times every hour while awake.  Rest longer if you become dizzy or lightheaded.                      [facility]     Preoperative Deep Breathing Exercises     Why it is important to do deep breathing exercises before my surgery?  Deep breathing exercises strengthen your breathing muscles.  This helps you to recover after your surgery and decreases the chance of breathing complications.        How are the deep breathing exercises done?  Sit straight with your back supported.  Breathe in deeply and slowly through your nose. Your lower rib cage should expand and your abdomen may move forward.  Hold that breath for 3 to 5 seconds.  Breathe out through pursed lips, slowly and completely.  Rest and repeat 10 times every hour while awake.  Rest longer if you become dizzy or lightheaded.        Patient Information: Incentive Spirometer  What is an incentive spirometer?  An incentive spirometer is a device used before and after surgery to “exercise” your lungs.  It helps you to take deeper breaths to expand your lungs.   Below is an example of a basic incentive spirometer.  The device you receive may differ slightly but they all function the same.    Why do I need to use an incentive spirometer?  Using your incentive spirometer prepares your lungs for surgery and helps prevent lung problems after surgery.  How do I use my incentive spirometer?  When you're using your incentive spirometer, make sure to breathe through your mouth. If you breathe through your nose, the incentive spirometer won't work properly. You can hold your nose if you have trouble.  If you feel dizzy at any time, stop and rest. Try again at a later time.  Follow the steps below:  Set up your incentive spirometer, expand the flexible tubing and connect to the outlet.  Sit upright in a chair or bed. Hold the incentive spirometer at eye level.   Put the mouthpiece in your mouth and close your lips tightly around it. Slowly breathe out (exhale) completely.  Breathe in (inhale) slowly through your mouth as deeply as you can. As you take a breath, you will see the piston rise inside the large column. While the piston rises, the indicator should move upwards. It should stay in between the 2 arrows (see Figure).  Try to get the piston as high as you can, while keeping the indicator between the arrows.   If the indicator doesn't stay between the arrows, you're breathing either too fast or too slow.  When you get it as high as you can, hold your breath for 10 seconds, or as long as possible. While you're holding your breath, the piston will slowly fall to the base of the spirometer.  Once the piston reaches the bottom of the spirometer, breathe out slowly through your mouth. Rest for a few seconds.  Repeat 10 times. Try to get the piston to the same level with each breath.  Repeat every hour while awake  You can carefully clean the outside of the mouthpiece with an alcohol wipe or soap and water.       Patient and Family Education             Ways You Can Help Prevent Blood  Clots                    This handout explains some simple things you can do to help prevent blood clots.      Blood clots are blockages that can form in the body's veins. When a blood clot forms in your deep veins, it may be called a deep vein thrombosis, or DVT for short. Blood clots can happen in any part of the body where blood flows, but they are most common in the arms and legs. If a piece of a blood clot breaks free and travels to the lungs, it is called a pulmonary embolus (PE). A PE can be a very serious problem.         Being in the hospital or having surgery can raise your chances of getting a blood clot because you may not be well enough to move around as much as you normally do.         Ways you can help prevent blood clots in the hospital           Wearing SCDs. SCDs stands for Sequential Compression Devices.   SCDs are special sleeves that wrap around your legs  They attach to a pump that fills them with air to gently squeeze your legs every few minutes.   This helps return the blood in your legs to your heart.   SCDs should only be taken off when walking or bathing.   SCDs may not be comfortable, but they can help save your life.                                            Wearing compression stockings - if your doctor orders them. These special snug fitting stockings gently squeeze your legs to help blood flow.       Walking. Walking helps move the blood in your legs.   If your doctor says it is ok, try walking the halls at least   5 times a day. Ask us to help you get up, so you don't fall.      Taking any blood thinning medicines your doctor orders.        Page 1 of 2            [facility]; 3/23   Ways you can help prevent blood clots at home         Wearing compression stockings - if your doctor orders them. ? Walking - to help move the blood in your legs.       Taking any blood thinning medicines your doctor orders.      Signs of a blood clot or PE        Tell your doctor or nurse know  right away if you have of the problems listed below.    If you are at home, seek medical care right away. Call 911 for chest pain or problems breathing.                Signs of a blood clot (DVT) - such as pain,  swelling, redness or warmth in your arm or leg      Signs of a pulmonary embolism (PE) - such as chest pain or feeling short of breath

## 2025-02-03 NOTE — CPM/PAT H&P
CPM/PAT Evaluation       Name: Mikey Salinas (Mikey Salinas)  /Age: 1965/59 y.o.     In-Person       Chief Complaint: Unspecified rotator cuff tear or rupture of left shoulder, Strain of muscle, fascia and tendon of other parts of biceps,     HPI  Patient is an alert and oriented 59 year old scheduled for a  ARTHROSCOPY, SHOULDER, WITH ROTATOR CUFF REPAIR  on 25 with Dr. Dumont for Unspecified rotator cuff tear or rupture of left shoulder,  Strain of muscle, fascia and tendon of other parts of biceps . PMHX includes bursitis, factor II, DVT, PE. Presents to [facility] PAT today for perioperative risk stratification and optimization.     Past Medical History:   Diagnosis Date    Bursitis of shoulder     Clotting disorder (Multi)     Colon polyp 10/2023    1 tiny one removed.    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    History of deep venous thrombosis (DVT) of distal vein of left lower extremity 10/19/2023    2014    History of pulmonary embolism 10/19/2023    2014    HL (hearing loss) 2023    Tenitus    Hyperlipidemia 2023    Irritable bowel syndrome 2023    Other pulmonary embolism without acute cor pulmonale 2015    Pulmonary embolism    Personal history of diseases of the blood and blood-forming organs and certain disorders involving the immune mechanism     History of hypercoagulable state    Personal history of other venous thrombosis and embolism     History of deep venous thrombosis    Scoliosis 1978    Tension type headache 2023    Traumatic tear of supraspinatus tendon of right shoulder     left shoulder as well       Past Surgical History:   Procedure Laterality Date    COLONOSCOPY  10/2023    KNEE ARTHROSCOPY W/ DEBRIDEMENT  2015    Arthroscopy Knee Left    LIPOMA RESECTION N/A 2023    multiple abdominal lipoma's excised    MR HEAD ANGIO WO IV CONTRAST   12/15/2016    MR HEAD ANGIO WO IV CONTRAST [facility]    UPPER GASTROINTESTINAL ENDOSCOPY  7/2023    WISDOM TOOTH EXTRACTION  1982       Patient  reports being sexually active and has had partner(s) who are female. He reports using the following method of birth control/protection: None.    Family History   Problem Relation Name Age of Onset    Macular degeneration Mother Megha     Osteoporosis Mother Megha     Transient ischemic attack Mother Megha     Brain Aneurysm Mother Megha     Hypertension Mother Megha     Other (Heart valve surgery) Father Ray     Other (RHEUMATIC HEART DISEASE) Father Ray     Nephrolithiasis Father Ray     Heart disease Father Ray     Hypertension Father Ray     Breast cancer Sister Peg 58    Irritable bowel syndrome Sister Peg     ROGER disease Sister Peg     Hypertension Sister Peg     Cancer Sister Peg     Prostate cancer Brother      Cancer Brother Yves Salinas     Cancer Other GRAND FATHER        Allergies   Allergen Reactions    Chocolate Flavor GI Upset    Cocoa Nausea/vomiting    Penicillins Rash       Prior to Admission medications    Medication Sig Start Date End Date Taking? Authorizing Provider   acetaminophen (TylenoL) 325 mg tablet Take by mouth.   Yes Historical Provider, MD   chlordiazepoxide-clidinium (Librax) 5-2.5 mg capsule Take 4 capsules by mouth 4 times a day before meals. 8/30/24  Yes Historical Provider, MD   diclofenac sodium (Voltaren) 1 % gel gel Apply topically. 2/14/23  Yes Historical Provider, MD   L.acid/L.casei/B.bif/B.kenroy/FOS (PROBIOTIC BLEND ORAL) Take by mouth.   Yes Historical Provider, MD   NON FORMULARY Move free   Yes Historical Provider, MD   Xarelto 20 mg tablet TAKE 1 TABLET DAILY 11/18/24  Yes Felipe Salcedo MD   fluticasone (Flonase Allergy Relief) 50 mcg/actuation nasal spray Administer 1 spray into each nostril once daily.  Patient not taking: Reported on 2/3/2025    Historical Provider, MD   metoprolol succinate XL (Toprol-XL) 25 mg 24 hr  tablet Take 1 tablet (25 mg) by mouth once daily. Do not crush or chew.  Patient not taking: Reported on 2/3/2025 9/3/24 9/3/25  Rodo Kaur MD       Review of Systems   Constitutional: Negative for chills, decreased appetite, diaphoresis, fever and malaise/fatigue.   Eyes:  Negative for blurred vision and double vision.   Cardiovascular:  Negative for chest pain, claudication, cyanosis, dyspnea on exertion, irregular heartbeat, leg swelling, near-syncope and palpitations.   Respiratory:  Negative for cough, hemoptysis, shortness of breath and wheezing.    Endocrine: Negative for cold intolerance, heat intolerance, polydipsia, polyphagia and polyuria.   Gastrointestinal:  Negative for abdominal pain, constipation, diarrhea, dysphagia, nausea and vomiting.   Genitourinary:  Negative for bladder incontinence, dysuria, hematuria, incomplete emptying, nocturia, frequency, pelvic pain and urgency.   Neurological:  Negative for headaches, light-headedness, paresthesias, sensory change and weakness.   Psychiatric/Behavioral:  Negative for altered mental status.    Musculoskeletal: positive for myalgias, arthralgias     Vitals and nursing note reviewed.     Physical exam  Constitutional:       Appearance: Normal appearance. He is Obese.   HENT:      Head: Normocephalic and atraumatic.      Mouth/Throat:      Mouth: Mucous membranes are moist.      Pharynx: Oropharynx is clear.   Eyes:      Extraocular Movements: Extraocular movements intact.      Conjunctiva/sclera: Conjunctivae normal.      Pupils: Pupils are equal, round, and reactive to light.   Cardiovascular:      PMI at left midclavicular line. Normal rate. Regular rhythm. Normal S1. Normal S2.       Murmurs: There is no murmur.      No gallop.  No click. No rub.       No audible carotid bruit     No lower extremity edema on exam  Pulmonary:      Effort: Pulmonary effort is normal.      Breath sounds: Normal breath sounds.   Abdominal:      General: Abdomen is  flat. Bowel sounds are normal.      Palpations: Abdomen is soft and non-tender  Musculoskeletal:      Cervical back: Normal range of motion and neck supple.   Skin:     General: Skin is warm and dry.      Capillary Refill: Capillary refill takes less than 2 seconds.   Neurological:      General: No focal deficit present.      Mental Status: He is alert and oriented to person, place, and time. Mental status is at baseline.   Psychiatric:         Mood and Affect: Mood normal.         Behavior: Behavior normal.         Thought Content: Thought content normal.         Judgment: Judgment normal.     Vitals and nursing note reviewed. Physical exam within normal limits.         DASI Risk Score      Flowsheet Row Pre-Admission Testing from 2/3/2025 in [facility] Questionnaire Series Submission from 1/5/2025 in [facility] with Generic Provider Yaya   Can you take care of yourself (eat, dress, bathe, or use toilet)?  2.75 filed at 02/03/2025 0951 2.75  filed at 01/05/2025 1934   Can you walk indoors, such as around your house? 1.75 filed at 02/03/2025 0951 1.75  filed at 01/05/2025 1934   Can you walk a block or two on level ground?  2.75 filed at 02/03/2025 0951 2.75  filed at 01/05/2025 1934   Can you climb a flight of stairs or walk up a hill? 5.5 filed at 02/03/2025 0951 5.5  filed at 01/05/2025 1934   Can you run a short distance? 8 filed at 02/03/2025 0951 8  filed at 01/05/2025 1934   Can you do light work around the house like dusting or washing dishes? 2.7 filed at 02/03/2025 0951 2.7  filed at 01/05/2025 1934   Can you do moderate work around the house like vacuuming, sweeping floors or carrying groceries? 3.5 filed at 02/03/2025 0951 3.5  filed at 01/05/2025 1934   Can you do heavy work around the house like scrubbing floors or lifting and moving heavy furniture?  8 filed at 02/03/2025 0951 8  filed at 01/05/2025 1934   Can you do yard work like raking leaves, weeding or pushing a mower? 4.5  filed at 02/03/2025 0951 4.5  filed at 01/05/2025 1934   Can you have sexual relations? 5.25 filed at 02/03/2025 0951 5.25  filed at 01/05/2025 1934   Can you participate in moderate recreational activities like golf, bowling, dancing, doubles tennis or throwing a baseball or football? 6 filed at 02/03/2025 0951 6  filed at 01/05/2025 1934   Can you participate in strenous sports like swimming, singles tennis, football, basketball, or skiing? 0 filed at 02/03/2025 0951 0  filed at 01/05/2025 1934   DASI SCORE 50.7 filed at 02/03/2025 0951 50.7  filed at 01/05/2025 1934   METS Score (Will be calculated only when all the questions are answered) 9 filed at 02/03/2025 0951 9  filed at 01/05/2025 1934          Capvalenciai DVT Assessment      Flowsheet Row Pre-Admission Testing from 2/3/2025 in [facility]   DVT Score (IF A SCORE IS NOT CALCULATING, MUST SELECT A BMI TO COMPLETE) 13 filed at 02/03/2025 0948   Medical Factors History of DVT/PE filed at 02/03/2025 0948   Surgical Factors Major surgery planned, including arthroscopic and laproscopic (1-2 hours) filed at 02/03/2025 0948   Labs/Test Results Positive Prothrombin Gene Mutation (27777O) filed at 02/03/2025 0948   BMI (BMI MUST BE CHOSEN) 31-40 (Obesity) filed at 02/03/2025 0948          Modified Frailty Index    No data to display       CHADS2 Stroke Risk  Current as of 19 minutes ago        N/A 3 to 100%: High Risk   2 to < 3%: Medium Risk   0 to < 2%: Low Risk     Last Change: N/A          This score determines the patient's risk of having a stroke if the patient has atrial fibrillation.        This score is not applicable to this patient. Components are not calculated.          Revised Cardiac Risk Index      Flowsheet Row Pre-Admission Testing from 2/3/2025 in [facility]   High-Risk Surgery (Intraperitoneal, Intrathoracic,Suprainguinal vascular) 0 filed at 02/03/2025 0951   History of ischemic heart disease (History of MI, History  of positive exercuse test, Current chest paint considered due to myocardial ischemia, Use of nitrate therapy, ECG with pathological Q Waves) 0 filed at 02/03/2025 0951   History of congestive heart failure (pulmonary edemia, bilateral rales or S3 gallop, Paroxysmal nocturnal dyspnea, CXR showing pulmonary vascular redistribution) 0 filed at 02/03/2025 0951   History of cerebrovascular disease (Prior TIA or stroke) 0 filed at 02/03/2025 0951   Pre-operative insulin treatment 0 filed at 02/03/2025 0951   Pre-operative creatinine>2 mg/dl 0 filed at 02/03/2025 0951   Revised Cardiac Risk Calculator 0 filed at 02/03/2025 0951          Apfel Simplified Score    No data to display       Risk Analysis Index Results This Encounter    No data found in the last 10 encounters.       Stop Bang Score      Flowsheet Row Pre-Admission Testing from 2/3/2025 in [facility] Questionnaire Series Submission from 1/5/2025 in [facility] with Generic Provider Yaya   Do you snore loudly? 0 filed at 02/03/2025 0912 0  filed at 01/05/2025 1934   Do you often feel tired or fatigued after your sleep? 0 filed at 02/03/2025 0912 0  filed at 01/05/2025 1934   Has anyone ever observed you stop breathing in your sleep? 0 filed at 02/03/2025 0912 0  filed at 01/05/2025 1934   Do you have or are you being treated for high blood pressure? 0 filed at 02/03/2025 0912 0  filed at 01/05/2025 1934   Recent BMI (Calculated) 32.1 filed at 02/03/2025 0912 32.1  filed at 01/05/2025 1934   Is BMI greater than 35 kg/m2? 0=No filed at 02/03/2025 0912 0=No  filed at 01/05/2025 1934   Age older than 50 years old? 1=Yes filed at 02/03/2025 0912 1=Yes  filed at 01/05/2025 1934   Is your neck circumference greater than 17 inches (Male) or 16 inches (Female)? 0 filed at 02/03/2025 0912 --   Gender - Male 1=Yes filed at 02/03/2025 0912 1=Yes  filed at 01/05/2025 1934   STOP-BANG Total Score 2 filed at 02/03/2025 0912 --          Prodigy: High Risk   Total Score: 16              Prodigy Age Score      Prodigy Gender Score          ARISCAT Score for Postoperative Pulmonary Complications      Flowsheet Row Pre-Admission Testing from 2/3/2025 in [facility]   Age Calculated Score 3 filed at 02/03/2025 0952   Preoperative SpO2 0 filed at 02/03/2025 0952   Respiratory infection in the last month Either upper or lower (i.e., URI, bronchitis, pneumonia), with fever and antibiotic treatment 0 filed at 02/03/2025 0952   Preoperative anemia (Hgb less than 10 g/dl) 0 filed at 02/03/2025 0952   Surgical incision  0 filed at 02/03/2025 0952   Duration of surgery  0 filed at 02/03/2025 0952   Emergency Procedure  0 filed at 02/03/2025 0952   ARISCAT Total Score  3 filed at 02/03/2025 0952          Annel Perioperative Risk for Myocardial Infarction or Cardiac Arrest (TERRANCE)      Flowsheet Row Pre-Admission Testing from 2/3/2025 in [facility]   Calculated Age Score 1.2 filed at 02/03/2025 0951   Functional Status  0 filed at 02/03/2025 0951   ASA Class  -1.92 filed at 02/03/2025 0951   Creatinine 0 filed at 02/03/2025 0951   Type of Procedure  0.80 filed at 02/03/2025 0951   TERRANCE Total Score  -5.17 filed at 02/03/2025 0951   TERRANCE % 0.57 filed at 02/03/2025 0951              Assessment & Plan:    Neuro:  No diagnosis or significant findings on chart review or clinical presentation and evaluation.     HEENT/Airway:  No diagnosis or significant findings on chart review or clinical presentation and evaluation.   STOP-BANG Score- 3 points moderaterisk for DOT    Mallampati::  II    TM distance::  >3 FB    Neck ROM::  Full  Dentures-denies  Crowns-reports  Implants-denies    Cardiovascular:  HLD, HTN (metoprolol)   METS: 9  RCRI: 0 points, 3.9%  risk for postoperative MACE   TERRANCE: 0.57% risk for postoperative MACE  EKG -normal sinus rhythm Rate- No acute changes.   TTE 12/4/24   1. The left ventricular systolic function is low normal, with a visually  estimated ejection fraction of 50-55%.   2. There is normal right ventricular global systolic function.   3. No evidence of mitral valve regurgitation.   4. Mild tricuspid regurgitation is visualized.    Pulmonary:  No diagnosis or significant findings on chart review or clinical presentation and evaluation.   ARISCAT: <26 points, 1.6% risk of in-hospital postoperative pulmonary complication  PRODIGY: Low risk for opioid induced respiratory depression  Smoking History-He has never smoked.  Discussed smoking cessation and deep breathing handout given    Renal/Urinary:  No diagnosis or significant findings on chart review or clinical presentation and evaluation, however, the patient is at increased risk of perioperative renal complications secondary to male sex. Preventative measures include BP monitoring, medication compliance, and hydration management.   CMP-Pending  Creatinine-  GFR-    Endocrine:  No diagnosis or significant findings on chart review or clinical presentation and evaluation.   [id number]    Hematologic/Immunology:  Factor II deficiency   Hx of DVT/PE - on xarelto   The patient is not a Jehovah’s witness and will accept blood and blood products if medically indicated.   History of previous blood transfusions No  CBC-Pending  HGB-Pending  Caprini Score 13, patient at High for postoperative DVT. Pt supplied education/VTE handout  Anticoagulation use: Yes     Gastrointestinal:   GERD, IBS (Librax)  Recreational drug use: none  Alcohol use 1 glasses of wine per week    Infectious disease:   No diagnosis or significant findings on chart review or clinical presentation and evaluation.     Musculoskeletal:   Shoulder pain   JHFRAT score-4  points. low risk for falls    Anesthesia:  ASA 3 - Patient with moderate systemic disease with functional limitations  Anticipated anesthesia-general  History of General anesthesia- yes  Complications- No anesthesia complications  No family history of anesthesia  complications    Abnormalities noted on PAT evaluation: No    Labs & Imaging ordered:  Cbc, cmp  Nickel/metal allergy-negative  Shellfish allergy-negative    Discussed with patient medication instructions, NPO guidelines, and any questions or concerns.   Will need instructions from Dr. Kim for Xarelto - pt has Factor II   time spent 45 minutes

## 2025-02-10 ENCOUNTER — TELEPHONE (OUTPATIENT)
Dept: PRIMARY CARE | Facility: CLINIC | Age: 60
End: 2025-02-10
Payer: COMMERCIAL

## 2025-02-10 NOTE — TELEPHONE ENCOUNTER
ELISEO NOWAK [phone] FROM [facility] TESTING STATES SHE NEEDS DAVE INSTRUCTIONS PT IS CLEARED FOR SURGERY BUT NEEDS INSTRUCTIOINS FOR PRE SURGERY 2/17 RT SHOULDER

## 2025-02-17 ENCOUNTER — ANESTHESIA EVENT (OUTPATIENT)
Dept: OPERATING ROOM | Facility: HOSPITAL | Age: 60
End: 2025-02-17
Payer: COMMERCIAL

## 2025-02-17 ENCOUNTER — ANESTHESIA (OUTPATIENT)
Dept: OPERATING ROOM | Facility: HOSPITAL | Age: 60
End: 2025-02-17
Payer: COMMERCIAL

## 2025-02-17 ENCOUNTER — HOSPITAL ENCOUNTER (OUTPATIENT)
Facility: HOSPITAL | Age: 60
Setting detail: OUTPATIENT SURGERY
Discharge: HOME | End: 2025-02-17
Attending: STUDENT IN AN ORGANIZED HEALTH CARE EDUCATION/TRAINING PROGRAM | Admitting: STUDENT IN AN ORGANIZED HEALTH CARE EDUCATION/TRAINING PROGRAM
Payer: COMMERCIAL

## 2025-02-17 VITALS
WEIGHT: 224.65 LBS | BODY MASS INDEX: 32.16 KG/M2 | SYSTOLIC BLOOD PRESSURE: 123 MMHG | RESPIRATION RATE: 15 BRPM | TEMPERATURE: 97.3 F | HEIGHT: 70 IN | DIASTOLIC BLOOD PRESSURE: 84 MMHG | HEART RATE: 77 BPM | OXYGEN SATURATION: 94 %

## 2025-02-17 DIAGNOSIS — Z48.89 AFTERCARE FOLLOWING SURGERY: Primary | ICD-10-CM

## 2025-02-17 PROCEDURE — C1781 MESH (IMPLANTABLE): HCPCS | Performed by: STUDENT IN AN ORGANIZED HEALTH CARE EDUCATION/TRAINING PROGRAM

## 2025-02-17 PROCEDURE — 76942 ECHO GUIDE FOR BIOPSY: CPT | Performed by: ANESTHESIOLOGY

## 2025-02-17 PROCEDURE — 2500000004 HC RX 250 GENERAL PHARMACY W/ HCPCS (ALT 636 FOR OP/ED): Performed by: ANESTHESIOLOGY

## 2025-02-17 PROCEDURE — 2500000005 HC RX 250 GENERAL PHARMACY W/O HCPCS: Performed by: STUDENT IN AN ORGANIZED HEALTH CARE EDUCATION/TRAINING PROGRAM

## 2025-02-17 PROCEDURE — 29823 SHO ARTHRS SRG XTNSV DBRDMT: CPT | Performed by: STUDENT IN AN ORGANIZED HEALTH CARE EDUCATION/TRAINING PROGRAM

## 2025-02-17 PROCEDURE — A4649 SURGICAL SUPPLIES: HCPCS | Performed by: STUDENT IN AN ORGANIZED HEALTH CARE EDUCATION/TRAINING PROGRAM

## 2025-02-17 PROCEDURE — 3600000009 HC OR TIME - EACH INCREMENTAL 1 MINUTE - PROCEDURE LEVEL FOUR: Performed by: STUDENT IN AN ORGANIZED HEALTH CARE EDUCATION/TRAINING PROGRAM

## 2025-02-17 PROCEDURE — 2780000003 HC OR 278 NO HCPCS: Performed by: STUDENT IN AN ORGANIZED HEALTH CARE EDUCATION/TRAINING PROGRAM

## 2025-02-17 PROCEDURE — 29827 SHO ARTHRS SRG RT8TR CUF RPR: CPT | Performed by: STUDENT IN AN ORGANIZED HEALTH CARE EDUCATION/TRAINING PROGRAM

## 2025-02-17 PROCEDURE — 2500000004 HC RX 250 GENERAL PHARMACY W/ HCPCS (ALT 636 FOR OP/ED)

## 2025-02-17 PROCEDURE — A29827 PR SHLDR ARTHROSCOP,SURG,W/ROTAT CUFF REPR: Performed by: STUDENT IN AN ORGANIZED HEALTH CARE EDUCATION/TRAINING PROGRAM

## 2025-02-17 PROCEDURE — 29823 SHO ARTHRS SRG XTNSV DBRDMT: CPT

## 2025-02-17 PROCEDURE — 2720000007 HC OR 272 NO HCPCS: Performed by: STUDENT IN AN ORGANIZED HEALTH CARE EDUCATION/TRAINING PROGRAM

## 2025-02-17 PROCEDURE — 2500000004 HC RX 250 GENERAL PHARMACY W/ HCPCS (ALT 636 FOR OP/ED): Performed by: STUDENT IN AN ORGANIZED HEALTH CARE EDUCATION/TRAINING PROGRAM

## 2025-02-17 PROCEDURE — 29827 SHO ARTHRS SRG RT8TR CUF RPR: CPT

## 2025-02-17 PROCEDURE — 3600000004 HC OR TIME - INITIAL BASE CHARGE - PROCEDURE LEVEL FOUR: Performed by: STUDENT IN AN ORGANIZED HEALTH CARE EDUCATION/TRAINING PROGRAM

## 2025-02-17 PROCEDURE — 64420 NJX AA&/STRD NTRCOST NRV 1: CPT | Performed by: ANESTHESIOLOGY

## 2025-02-17 PROCEDURE — C1713 ANCHOR/SCREW BN/BN,TIS/BN: HCPCS | Performed by: STUDENT IN AN ORGANIZED HEALTH CARE EDUCATION/TRAINING PROGRAM

## 2025-02-17 PROCEDURE — 7100000009 HC PHASE TWO TIME - INITIAL BASE CHARGE: Performed by: STUDENT IN AN ORGANIZED HEALTH CARE EDUCATION/TRAINING PROGRAM

## 2025-02-17 PROCEDURE — 7100000010 HC PHASE TWO TIME - EACH INCREMENTAL 1 MINUTE: Performed by: STUDENT IN AN ORGANIZED HEALTH CARE EDUCATION/TRAINING PROGRAM

## 2025-02-17 PROCEDURE — 3700000002 HC GENERAL ANESTHESIA TIME - EACH INCREMENTAL 1 MINUTE: Performed by: STUDENT IN AN ORGANIZED HEALTH CARE EDUCATION/TRAINING PROGRAM

## 2025-02-17 PROCEDURE — 3700000001 HC GENERAL ANESTHESIA TIME - INITIAL BASE CHARGE: Performed by: STUDENT IN AN ORGANIZED HEALTH CARE EDUCATION/TRAINING PROGRAM

## 2025-02-17 PROCEDURE — 7100000002 HC RECOVERY ROOM TIME - EACH INCREMENTAL 1 MINUTE: Performed by: STUDENT IN AN ORGANIZED HEALTH CARE EDUCATION/TRAINING PROGRAM

## 2025-02-17 PROCEDURE — 64415 NJX AA&/STRD BRCH PLXS IMG: CPT | Performed by: ANESTHESIOLOGY

## 2025-02-17 PROCEDURE — 2500000004 HC RX 250 GENERAL PHARMACY W/ HCPCS (ALT 636 FOR OP/ED): Performed by: NURSE ANESTHETIST, CERTIFIED REGISTERED

## 2025-02-17 PROCEDURE — A29827 PR SHLDR ARTHROSCOP,SURG,W/ROTAT CUFF REPR: Performed by: NURSE ANESTHETIST, CERTIFIED REGISTERED

## 2025-02-17 PROCEDURE — 7100000001 HC RECOVERY ROOM TIME - INITIAL BASE CHARGE: Performed by: STUDENT IN AN ORGANIZED HEALTH CARE EDUCATION/TRAINING PROGRAM

## 2025-02-17 DEVICE — BONE ANCHORS 3 WITH ARTHROSCOPIC DELIVERY SYSTEM ADVANCED
Type: IMPLANTABLE DEVICE | Site: SHOULDER | Status: FUNCTIONAL
Brand: BONE ANCHORS WITH ARTHROSCOPIC DELIVERY SYSTEM - ADVANCED

## 2025-02-17 DEVICE — IMPLANTABLE DEVICE
Type: IMPLANTABLE DEVICE | Site: SHOULDER | Status: FUNCTIONAL
Brand: ROTATION MEDICAL TENDON STAPLES

## 2025-02-17 DEVICE — SPEEDBRG IMPL SYS W/ PEEK SWVLK
Type: IMPLANTABLE DEVICE | Site: SHOULDER | Status: FUNCTIONAL
Brand: ARTHREX®

## 2025-02-17 DEVICE — IMPLANTABLE DEVICE
Type: IMPLANTABLE DEVICE | Site: SHOULDER | Status: FUNCTIONAL
Brand: BIOINDUCTIVE IMPLANT WITH ARTHROSCOPIC DELIVERY SYSTEM - MEDIUM

## 2025-02-17 RX ORDER — MEPERIDINE HYDROCHLORIDE 25 MG/ML
12.5 INJECTION INTRAMUSCULAR; INTRAVENOUS; SUBCUTANEOUS EVERY 10 MIN PRN
Status: DISCONTINUED | OUTPATIENT
Start: 2025-02-17 | End: 2025-02-17 | Stop reason: HOSPADM

## 2025-02-17 RX ORDER — FENTANYL CITRATE 50 UG/ML
50 INJECTION, SOLUTION INTRAMUSCULAR; INTRAVENOUS EVERY 5 MIN PRN
Status: DISCONTINUED | OUTPATIENT
Start: 2025-02-17 | End: 2025-02-17 | Stop reason: HOSPADM

## 2025-02-17 RX ORDER — CEFAZOLIN SODIUM 2 G/100ML
2 INJECTION, SOLUTION INTRAVENOUS ONCE
Status: COMPLETED | OUTPATIENT
Start: 2025-02-17 | End: 2025-02-17

## 2025-02-17 RX ORDER — OXYCODONE AND ACETAMINOPHEN 5; 325 MG/1; MG/1
1 TABLET ORAL EVERY 6 HOURS PRN
Qty: 12 TABLET | Refills: 0 | Status: SHIPPED | OUTPATIENT
Start: 2025-02-17 | End: 2025-02-20

## 2025-02-17 RX ORDER — ALBUTEROL SULFATE 0.83 MG/ML
2.5 SOLUTION RESPIRATORY (INHALATION) EVERY 30 MIN PRN
Status: DISCONTINUED | OUTPATIENT
Start: 2025-02-17 | End: 2025-02-17 | Stop reason: HOSPADM

## 2025-02-17 RX ORDER — KETOROLAC TROMETHAMINE 30 MG/ML
INJECTION, SOLUTION INTRAMUSCULAR; INTRAVENOUS AS NEEDED
Status: DISCONTINUED | OUTPATIENT
Start: 2025-02-17 | End: 2025-02-17

## 2025-02-17 RX ORDER — FAMOTIDINE 10 MG/ML
20 INJECTION, SOLUTION INTRAVENOUS ONCE
Status: COMPLETED | OUTPATIENT
Start: 2025-02-17 | End: 2025-02-17

## 2025-02-17 RX ORDER — HYDROMORPHONE HYDROCHLORIDE 0.2 MG/ML
0.2 INJECTION INTRAMUSCULAR; INTRAVENOUS; SUBCUTANEOUS EVERY 5 MIN PRN
Status: DISCONTINUED | OUTPATIENT
Start: 2025-02-17 | End: 2025-02-17 | Stop reason: HOSPADM

## 2025-02-17 RX ORDER — PROPOFOL 10 MG/ML
INJECTION, EMULSION INTRAVENOUS AS NEEDED
Status: DISCONTINUED | OUTPATIENT
Start: 2025-02-17 | End: 2025-02-17

## 2025-02-17 RX ORDER — ROCURONIUM BROMIDE 10 MG/ML
INJECTION, SOLUTION INTRAVENOUS AS NEEDED
Status: DISCONTINUED | OUTPATIENT
Start: 2025-02-17 | End: 2025-02-17

## 2025-02-17 RX ORDER — IPRATROPIUM BROMIDE 0.5 MG/2.5ML
500 SOLUTION RESPIRATORY (INHALATION) EVERY 30 MIN PRN
Status: DISCONTINUED | OUTPATIENT
Start: 2025-02-17 | End: 2025-02-17 | Stop reason: HOSPADM

## 2025-02-17 RX ORDER — LABETALOL HYDROCHLORIDE 5 MG/ML
5 INJECTION, SOLUTION INTRAVENOUS EVERY 5 MIN PRN
Status: DISCONTINUED | OUTPATIENT
Start: 2025-02-17 | End: 2025-02-17 | Stop reason: HOSPADM

## 2025-02-17 RX ORDER — ONDANSETRON HYDROCHLORIDE 2 MG/ML
INJECTION, SOLUTION INTRAVENOUS AS NEEDED
Status: DISCONTINUED | OUTPATIENT
Start: 2025-02-17 | End: 2025-02-17

## 2025-02-17 RX ORDER — DOCUSATE SODIUM 100 MG/1
100 CAPSULE, LIQUID FILLED ORAL 2 TIMES DAILY
Qty: 10 CAPSULE | Refills: 0 | Status: SHIPPED | OUTPATIENT
Start: 2025-02-17 | End: 2025-02-22

## 2025-02-17 RX ORDER — LIDOCAINE HYDROCHLORIDE 10 MG/ML
INJECTION, SOLUTION INFILTRATION; PERINEURAL AS NEEDED
Status: DISCONTINUED | OUTPATIENT
Start: 2025-02-17 | End: 2025-02-17

## 2025-02-17 RX ORDER — ESMOLOL HYDROCHLORIDE 10 MG/ML
INJECTION INTRAVENOUS AS NEEDED
Status: DISCONTINUED | OUTPATIENT
Start: 2025-02-17 | End: 2025-02-17

## 2025-02-17 RX ORDER — SODIUM CHLORIDE, SODIUM LACTATE, POTASSIUM CHLORIDE, CALCIUM CHLORIDE 600; 310; 30; 20 MG/100ML; MG/100ML; MG/100ML; MG/100ML
40 INJECTION, SOLUTION INTRAVENOUS CONTINUOUS
Status: DISCONTINUED | OUTPATIENT
Start: 2025-02-17 | End: 2025-02-17 | Stop reason: HOSPADM

## 2025-02-17 RX ORDER — FENTANYL CITRATE 50 UG/ML
100 INJECTION, SOLUTION INTRAMUSCULAR; INTRAVENOUS ONCE
Status: COMPLETED | OUTPATIENT
Start: 2025-02-17 | End: 2025-02-17

## 2025-02-17 RX ORDER — ONDANSETRON HYDROCHLORIDE 2 MG/ML
4 INJECTION, SOLUTION INTRAVENOUS ONCE AS NEEDED
Status: DISCONTINUED | OUTPATIENT
Start: 2025-02-17 | End: 2025-02-17 | Stop reason: HOSPADM

## 2025-02-17 RX ORDER — FENTANYL CITRATE 50 UG/ML
INJECTION, SOLUTION INTRAMUSCULAR; INTRAVENOUS AS NEEDED
Status: DISCONTINUED | OUTPATIENT
Start: 2025-02-17 | End: 2025-02-17

## 2025-02-17 RX ORDER — DEXAMETHASONE SODIUM PHOSPHATE 10 MG/ML
INJECTION INTRAMUSCULAR; INTRAVENOUS AS NEEDED
Status: DISCONTINUED | OUTPATIENT
Start: 2025-02-17 | End: 2025-02-17

## 2025-02-17 RX ORDER — MIDAZOLAM HYDROCHLORIDE 1 MG/ML
2 INJECTION, SOLUTION INTRAMUSCULAR; INTRAVENOUS ONCE
Status: COMPLETED | OUTPATIENT
Start: 2025-02-17 | End: 2025-02-17

## 2025-02-17 RX ORDER — BUPIVACAINE HYDROCHLORIDE 5 MG/ML
INJECTION, SOLUTION PERINEURAL AS NEEDED
Status: DISCONTINUED | OUTPATIENT
Start: 2025-02-17 | End: 2025-02-17

## 2025-02-17 RX ADMIN — BUPIVACAINE HYDROCHLORIDE 20 ML: 5 INJECTION, SOLUTION PERINEURAL at 08:58

## 2025-02-17 RX ADMIN — MIDAZOLAM 2 MG: 1 INJECTION INTRAMUSCULAR; INTRAVENOUS at 08:56

## 2025-02-17 RX ADMIN — LIDOCAINE HYDROCHLORIDE 50 MG: 10 INJECTION, SOLUTION EPIDURAL; INFILTRATION; INTRACAUDAL; PERINEURAL at 09:12

## 2025-02-17 RX ADMIN — BUPIVACAINE HYDROCHLORIDE 10 ML: 5 INJECTION, SOLUTION PERINEURAL at 09:00

## 2025-02-17 RX ADMIN — ROCURONIUM BROMIDE 10 MG: 10 INJECTION, SOLUTION INTRAVENOUS at 10:26

## 2025-02-17 RX ADMIN — FENTANYL CITRATE 50 MCG: 50 INJECTION INTRAMUSCULAR; INTRAVENOUS at 09:15

## 2025-02-17 RX ADMIN — PROPOFOL 30 MG: 10 INJECTION, EMULSION INTRAVENOUS at 10:47

## 2025-02-17 RX ADMIN — SODIUM CHLORIDE, POTASSIUM CHLORIDE, SODIUM LACTATE AND CALCIUM CHLORIDE: 600; 310; 30; 20 INJECTION, SOLUTION INTRAVENOUS at 09:10

## 2025-02-17 RX ADMIN — ROCURONIUM BROMIDE 50 MG: 10 INJECTION, SOLUTION INTRAVENOUS at 09:14

## 2025-02-17 RX ADMIN — SUGAMMADEX 200 MG: 100 INJECTION, SOLUTION INTRAVENOUS at 10:51

## 2025-02-17 RX ADMIN — DEXAMETHASONE SODIUM PHOSPHATE 10 MG: 10 INJECTION, SOLUTION INTRAMUSCULAR; INTRAVENOUS at 08:58

## 2025-02-17 RX ADMIN — ESMOLOL HYDROCHLORIDE 10 MG: 100 INJECTION, SOLUTION INTRAVENOUS at 10:34

## 2025-02-17 RX ADMIN — CEFAZOLIN SODIUM 2 G: 2 INJECTION, SOLUTION INTRAVENOUS at 09:19

## 2025-02-17 RX ADMIN — ONDANSETRON 4 MG: 2 INJECTION, SOLUTION INTRAMUSCULAR; INTRAVENOUS at 10:27

## 2025-02-17 RX ADMIN — ESMOLOL HYDROCHLORIDE 10 MG: 100 INJECTION, SOLUTION INTRAVENOUS at 10:16

## 2025-02-17 RX ADMIN — PROPOFOL 50 MG: 10 INJECTION, EMULSION INTRAVENOUS at 10:09

## 2025-02-17 RX ADMIN — FENTANYL CITRATE 100 MCG: 50 INJECTION INTRAMUSCULAR; INTRAVENOUS at 08:56

## 2025-02-17 RX ADMIN — ESMOLOL HYDROCHLORIDE 20 MG: 100 INJECTION, SOLUTION INTRAVENOUS at 10:21

## 2025-02-17 RX ADMIN — PROPOFOL 20 MG: 10 INJECTION, EMULSION INTRAVENOUS at 10:40

## 2025-02-17 RX ADMIN — DEXAMETHASONE SODIUM PHOSPHATE 8 MG: 4 INJECTION, SOLUTION INTRAMUSCULAR; INTRAVENOUS at 09:32

## 2025-02-17 RX ADMIN — ROCURONIUM BROMIDE 10 MG: 10 INJECTION, SOLUTION INTRAVENOUS at 09:56

## 2025-02-17 RX ADMIN — PROPOFOL 200 MG: 10 INJECTION, EMULSION INTRAVENOUS at 09:13

## 2025-02-17 RX ADMIN — FENTANYL CITRATE 50 MCG: 50 INJECTION INTRAMUSCULAR; INTRAVENOUS at 09:12

## 2025-02-17 RX ADMIN — KETOROLAC TROMETHAMINE 15 MG: 30 INJECTION, SOLUTION INTRAMUSCULAR at 10:34

## 2025-02-17 RX ADMIN — PROPOFOL 50 MG: 10 INJECTION, EMULSION INTRAVENOUS at 09:24

## 2025-02-17 RX ADMIN — PROPOFOL 50 MG: 10 INJECTION, EMULSION INTRAVENOUS at 09:28

## 2025-02-17 RX ADMIN — ESMOLOL HYDROCHLORIDE 10 MG: 100 INJECTION, SOLUTION INTRAVENOUS at 10:27

## 2025-02-17 RX ADMIN — FAMOTIDINE 20 MG: 10 INJECTION, SOLUTION INTRAVENOUS at 08:41

## 2025-02-17 SDOH — HEALTH STABILITY: MENTAL HEALTH: CURRENT SMOKER: 0

## 2025-02-17 ASSESSMENT — PAIN SCALES - GENERAL
PAINLEVEL_OUTOF10: 0 - NO PAIN
PAINLEVEL_OUTOF10: 2
PAINLEVEL_OUTOF10: 0 - NO PAIN
PAINLEVEL_OUTOF10: 2

## 2025-02-17 ASSESSMENT — PAIN DESCRIPTION - DESCRIPTORS
DESCRIPTORS: NUMBNESS
DESCRIPTORS: NUMBNESS

## 2025-02-17 ASSESSMENT — COLUMBIA-SUICIDE SEVERITY RATING SCALE - C-SSRS
1. IN THE PAST MONTH, HAVE YOU WISHED YOU WERE DEAD OR WISHED YOU COULD GO TO SLEEP AND NOT WAKE UP?: NO
6. HAVE YOU EVER DONE ANYTHING, STARTED TO DO ANYTHING, OR PREPARED TO DO ANYTHING TO END YOUR LIFE?: NO
2. HAVE YOU ACTUALLY HAD ANY THOUGHTS OF KILLING YOURSELF?: NO

## 2025-02-17 ASSESSMENT — PAIN - FUNCTIONAL ASSESSMENT
PAIN_FUNCTIONAL_ASSESSMENT: WONG-BAKER FACES
PAIN_FUNCTIONAL_ASSESSMENT: 0-10
PAIN_FUNCTIONAL_ASSESSMENT: WONG-BAKER FACES
PAIN_FUNCTIONAL_ASSESSMENT: 0-10

## 2025-02-17 NOTE — H&P
H&P reviewed. The patient was examined and there are no changes to the H&P. Presents for scheduled rotator cuff repair.         Mikey Salinas is a 59 y.o. year-old  male  he returns regarding his right shoulder.  He has gotten his recent MRI and comes to review.        Past Medical, Family, and Social History reviewed      Review of Systems  A complete review of systems was conducted, pertinent only to the HPI noted above.     Physical Exam  GEN: Alert and Oriented x 3  Constitutional: Well appearing, in no apparent distress.  Eyes: sclera anicteric  ENT: hearing appropriate for normal conversation, neck appears symmetric with no gross thyromegaly  Pulm: No labored breathing, no wheezing  CVS: Regular rate and rhythm  PSY: normal mood and affect  Skin: No rashes, erythema, or induration around shoulder     Focused Musculoskeletal Exam:      Side: Right shoulder:  PROM:   FE (170)   ER 60 ABER/ABIR: 90/90  AROM:   FE (170)   ER 45 IR T8  Strength:  Supra [4/5] Infra [5/5] Subscap [5/5]  Abd [5/5]  Positive Stephen's on right  Special Tests  Shoulder  positive Neer and Granado  Pain and weakness with empty can     ACJ:  AC TTP: [neg]  Cross Arm [neg]   AC prominence [no]        [Sensation intact Ax/median/ulnar/radial distributions  Motor intact Ax/median/radial/ulnar/AIN/PIN     X-rays  and MRI of the shoulder independently viewed and interpreted: High-grade partial-thickness rotator cuff tear on the right side would likely biceps rupture, left with what appears to be a small full-thickness rotator cuff tear without retraction.     Repeat MRI of the right shoulder demonstrates progression of his rotator cuff tear with now full-thickness roughly 2 cm tear with minimal retraction, chronic biceps rupture, degenerative labrum tear mild DJD     Patient was prescribed a [abduction sling for [rotator cuff tear. The patient has weakness, instability and/or deformity of their [Right shoulder which requires stabilization from  this orthosis to improve their function.  Verbal and written instructions for the use, wear schedule, cleaning and application of this item were given.  Patient was instructed that should the brace result in increased pain, decreased sensation, increased swelling, or an overall worsening of their medical condition, to please contact our office immediately. Orthotic management and training was provided for skin care, modifications due to healing tissues, edema changes, interruption in skin integrity, and safety precautions with the orthosis.      The patient history, physical examination and imaging studies are consistent with the aforementioned assessment. The treatment options including medical management and surgery including rotator cuff repair, possible bioconductive bovine collagen implant  were discussed at length. I discussed at length the importance of post-operative rehab and the risk for adhesive capsulitis if this therapy is not appropriately attended. The risks and benefits of the surgery including but not limited to bleeding, infection, nerve injury, and potential future surgery were also presented and reviewed. I discussed at length the fact that this surgery is designed to address pain and that recovery of strength is not predictable.  He is on a blood thinner for hypercoagulability, will need to see PCP regarding recommendations of coming off. all questions were answered. The patient acknowledged the explanation, agreed to proceed with the plan.

## 2025-02-17 NOTE — ANESTHESIA PROCEDURE NOTES
Peripheral Block    Patient location during procedure: pre-op  Start time: 2/17/2025 9:00 AM  End time: 2/17/2025 9:01 AM  Reason for block: at surgeon's request and post-op pain management  Staffing  Performed: attending   Authorized by: Panfilo Perkins MD    Performed by: Panfilo Perkins MD  Preanesthetic Checklist  Completed: patient identified, IV checked, site marked, risks and benefits discussed, surgical consent, monitors and equipment checked, pre-op evaluation and timeout performed   Timeout performed at: 2/17/2025 8:55 AM  Peripheral Block  Patient position: laying flat  Prep: alcohol swabs  Patient monitoring: heart rate, cardiac monitor and continuous pulse ox  Block type: other (intercostobrachial)  Injection technique: single-shot  Needle  Needle type: short-bevel   Needle gauge: 22 G  Needle length: 5 cm  Needle localization: ultrasound guidance  Assessment  Injection assessment: negative aspiration for heme, no paresthesia on injection and incremental injection  Paresthesia pain: none  Heart rate change: no  Slow fractionated injection: yes  Additional Notes  This was an intercostobrachial plexus block

## 2025-02-17 NOTE — ANESTHESIA PROCEDURE NOTES
Peripheral Block    Patient location during procedure: pre-op  Start time: 2/17/2025 8:58 AM  End time: 2/17/2025 8:59 AM  Reason for block: at surgeon's request and post-op pain management  Staffing  Performed: attending   Authorized by: Panfilo Perkins MD    Performed by: Panfilo Perkins MD  Preanesthetic Checklist  Completed: patient identified, IV checked, site marked, risks and benefits discussed, surgical consent, monitors and equipment checked, pre-op evaluation and timeout performed   Timeout performed at: 2/17/2025 8:55 AM  Peripheral Block  Patient position: laying flat  Prep: alcohol swabs  Patient monitoring: heart rate, cardiac monitor and continuous pulse ox  Block type: interscalene  Laterality: right  Injection technique: single-shot  Guidance: nerve stimulator and ultrasound guided  Needle  Needle type: short-bevel   Needle gauge: 22 G  Needle length: 5 cm  Needle localization: anatomical landmarks, ultrasound guidance and nerve stimulator  Needle insertion depth: 4 cm  Assessment  Injection assessment: negative aspiration for heme, no paresthesia on injection, incremental injection and local visualized surrounding nerve on ultrasound  Paresthesia pain: none  Heart rate change: no  Slow fractionated injection: yes  Additional Notes  Dexamethasone 10 mg added to local

## 2025-02-17 NOTE — ANESTHESIA PROCEDURE NOTES
Airway  Date/Time: 2/17/2025 9:17 AM  Urgency: elective    Airway not difficult    Staffing  Performed: LAUREN   Authorized by: Niraj Bailon DO    Performed by: WAYNE Ellintgon  Patient location during procedure: OR    Indications and Patient Condition  Indications for airway management: anesthesia and airway protection  Spontaneous Ventilation: absent  Sedation level: deep  Preoxygenated: yes  Patient position: sniffing  Mask difficulty assessment: 1 - vent by mask  Planned trial extubation    Final Airway Details  Final airway type: endotracheal airway      Successful airway: ETT  Cuffed: yes   Successful intubation technique: direct laryngoscopy  Facilitating devices/methods: intubating stylet  Endotracheal tube insertion site: oral  Blade: Anna  Blade size: #4  ETT size (mm): 7.5  Cormack-Lehane Classification: grade I - full view of glottis  Placement verified by: chest auscultation and capnometry   Measured from: lips  ETT to lips (cm): 23  Number of attempts at approach: 1

## 2025-02-17 NOTE — ANESTHESIA PREPROCEDURE EVALUATION
Patient: Mikey Salinas    Procedure Information       Date/Time: 02/17/25 0925    Procedures:       ARTHROSCOPY, SHOULDER, WITH ROTATOR CUFF REPAIR (Right: Shoulder) - General plus block anesthesia      Debridement Shoulder(Beachchair vascular table, Tennet table, Arthrex implant, Regeneten) (Right: Shoulder) - General plus block anesthesia    [facility]: [facility] / [facility]    Surgeons: Ricardo Dumont MD            Relevant Problems   Anesthesia (within normal limits)      Cardiac   (+) Hyperlipidemia      Pulmonary  Hx dvt/PE      Neuro   (+) Anxiety      GI   (+) Gastroesophageal reflux disease with hiatal hernia      Endocrine   (+) Obesity      Hematology   (+) Factor II deficiency (Multi)      Musculoskeletal   (+) Chronic bilateral low back pain   (+) Scoliosis       Clinical information reviewed:                   NPO Detail:  No data recorded     Physical Exam    Airway  Mallampati: II  TM distance: >3 FB  Neck ROM: full     Cardiovascular   Comments: deferred   Dental   Comments: No loose teeth   Pulmonary   Comments: deferred   Abdominal     Comments: deferred           Anesthesia Plan    History of general anesthesia?: yes  History of complications of general anesthesia?: no    ASA 3     general and regional     The patient is not a current smoker.  Patient was not previously instructed to abstain from smoking on day of procedure.  Patient did not smoke on day of procedure.  Education provided regarding risk of obstructive sleep apnea.  intravenous induction   Postoperative administration of opioids is intended.  Anesthetic plan and risks discussed with patient.  Use of blood products discussed with patient who consented to blood products.    Plan discussed with CRNA and CAA.

## 2025-02-17 NOTE — OP NOTE
ARTHROSCOPY, SHOULDER, WITH ROTATOR CUFF REPAIR (R), Debridement Shoulder(Beachchair vascular table, Tennet table, Arthrex implant, Regeneten) (R) Operative Note     Date: 2025  OR Location: [facility]    Name: Mikey Salinas : 1965, Age: 59 y.o., MRN: [id number], Sex: male    Diagnosis  Pre-op Diagnosis      * Unspecified rotator cuff tear or rupture of left shoulder, not specified as traumatic [M75.102]     * Strain of muscle, fascia and tendon of other parts of biceps, unspecified arm, initial encounter [S46.219A]     * Unspecified rotator cuff tear or rupture of right shoulder, not specified as traumatic [M75.101] Post-op Diagnosis     * Unspecified rotator cuff tear or rupture of left shoulder, not specified as traumatic [M75.102]     * Strain of muscle, fascia and tendon of other parts of biceps, unspecified arm, initial encounter [S46.219A]     * Unspecified rotator cuff tear or rupture of right shoulder, not specified as traumatic [M75.101]     Procedures  ARTHROSCOPY, SHOULDER, WITH ROTATOR CUFF REPAIR  23264 - [place] SURGICAL ARTHROSCOPY SHOULDER W/ROTATOR CUFF RPR    Debridement Shoulder(Beachchair vascular table, Tennet table, Arthrex implant, Regeneten)  60847 - [place] SURGICAL ARTHROSCOPY SHOULDER XTNSV DBRDMT 3+      Surgeons      * Ricardo Dumont - Primary    Resident/Fellow/Other Assistant:  Surgeons and Role:     * Rosanna Allen PA-C - [facility] Assist    Staff:   Circulator: Maryjo Prietoub Person: Radha Prietoub Person: Brenda    Anesthesia Staff: Anesthesiologist: Niraj Bailon DO  CRNA: AMOS Ellington-MIKO  SRNA: Terese Latham    Procedure Summary  Anesthesia: Regional, General  ASA: III  Estimated Blood Loss: 5mL  Intra-op Medications:   Administrations occurring from 0925 to 1150 on 25:   Medication Name Total Dose   EPINEPHrine (Adrenalin) 1 mg/mL 1 mg in sodium chloride 0.9 % 3,000 mL irrigation 4 mL   lidocaine-epinephrine PF (Xylocaine W/EPI) 1 %-1:200,000 injection  10 mL   dexAMETHasone (Decadron) 4 mg/mL 8 mg   esmolol (Brevibloc) injection 50 mg   ketorolac (Toradol) 15 mg 15 mg   ondansetron 2 mg/mL 4 mg   propofol (Diprivan) injection 10 mg/mL 150 mg   rocuronium (ZeMuron) 50 mg/5 mL injection 20 mg   sugammadex (Bridion) 200 mg/2 mL injection 200 mg              Anesthesia Record               Intraprocedure I/O Totals       None             Implants:  Implants       Type Name Action Serial No.       4.75 X 19.1 MM SPEEDBRIDGE IMPLANT SYSTEM W/ PEEK  Implanted      Implant BONE ANCHORS 3, WITH ARTHRO DELIVERY SYS ADVANCED - [id number] Implanted      Implant ANCHORS, TENDON 8 - [id number] Implanted      Implant DELIVERY SYSTEM, ARTHROSCOPIC, BIO INDUCTIVE, MEDIUM - [id number] Implanted               Findings: Large 3 cm rotator cuff tear with severe tendinopathy, biceps rupture, degenerative labrum tearing, partial upper border subscapularis tendon tear    Indications: Mikey Salinas is an 59 y.o. male who is having surgery for Unspecified rotator cuff tear or rupture of left shoulder, not specified as traumatic [M75.102]  Strain of muscle, fascia and tendon of other parts of biceps, unspecified arm, initial encounter [S46.219A]  Unspecified rotator cuff tear or rupture of right shoulder, not specified as traumatic [M75.101].  Patient with ongoing shoulder pain.  MRI with full-thickness rotator cuff tendon tear.  Reviewed the risks of surgery include but not limited to bleeding infection injury to any tissues nerves vessels DVT/PE tendon nonhealing and the potential need for future surgery.  He understood fully and wished to proceed.    The patient was seen in the preoperative area. The risks, benefits, complications, treatment options, non-operative alternatives, expected recovery and outcomes were discussed with the patient. The possibilities of reaction to medication, pulmonary aspiration, injury to surrounding structures, bleeding, recurrent infection, the need for  additional procedures, failure to diagnose a condition, and creating a complication requiring transfusion or operation were discussed with the patient. The patient concurred with the proposed plan, giving informed consent.  The site of surgery was properly noted/marked if necessary per policy. The patient has been actively warmed in preoperative area. Preoperative antibiotics have been ordered and given within 1 hours of incision. Venous thrombosis prophylaxis have been ordered including bilateral sequential compression devices    Procedure Details: PROCEDURE IN DETAIL:   The patient was met in the preoperative holding area.  right shoulder was identified as the correct operative limb by myself, the patient as well as attending anesthesiologist, marked with an indelible marker.  Informed consent was in the chart.  The patient received regional nerve block by Anesthesia, was taken back to the OR, placed supine on the operative bed in stable condition.  At this point, a formal interdisciplinary Huddle was carried out correctly identifying the patient, procedure, correct operative limb, and all necessary equipment. All in agreement.  The patient was surrendered under general anesthetic. Airway was secured with an endotracheal tube. Once patient was asleep, she was positioned in the beach chair position with a foam face mask assuring neutral coronal and sagittal head neck alignment. Nonoperative arm was placed in a well-padded arm mason.  SCDs were placed.  Operative arm was prepped and draped.  Patient received Ancef for antibiotics.  A time-out was called.  The spider arm was used to control the arm throughout the case.  I infiltrated the subacromial space with 5 mL 1% lidocaine with epinephrine.  We then made a posterior portal.  The arthroscope was placed into the joint.  An interval portal and cannula were established and diagnostic arthroscopy ensued.  There was a degenerative type II SLAP tear.  There was also a  posterior inferior nondisplaced chondral labral junction tear of the posterior labrum.  The upper border of the subscapularis demonstrated some mild fraying but the footprint was intact.  Full-thickness rotator cuff noted.  There is some tearing of the anterior superior labrum as well.  The biceps had previously ruptured.  The upper border of the subscapularis partial tear was then debrided with a shaver.  This did not require repair.  The biceps stump, labrum were gently debrided with a shaver back to a stable base.  The remainder of the shoulder did not demonstrate any significant arthrosis.  There was no loose bodies.  We then put the scope in subacromial space.  The bursectomy was completed of extensive bursal tissue.  This completed our extensive debridement of the subscapularis, superior labrum, posterior labrum, biceps stump, subscapularis and bursa.  I inspected the cuff tear.  It was a U-shaped tear, large about 3 cm.  There is severe tendinopathy of the of the anterior supraspinatus was then extremely thin tendon..  A looped traction stitch was placed in the mid substance of the tear.  Tuberoplasty was done down to bleeding bone for healing. Two 4.75mm SwiveLock anchors were then placed percutaneously anterior and posterior margins of the tear at the articular margin.  The sliding suture which was a #2 FiberWire from each anchor was then passed medially in a horizontal mattress fashion.  Tapes were then passed in a swedged fashion through the rotator cuff medialized to our mattress suture and in appropriate to reduce it back on its footprint.  The horizontal mattress sutures were then tied with sliding locking knots.  A SpeedBridge construct was then created by taking 1 tape from each anchor to a 4.75 SwiveLock anchor laterally for double row repair.  The central luggage tag stitch was incorporated as well this nicely compressed the rotator cuff back on the footprint. This completed our rotator cuff  repair.  Given the massive tear poor tendon quality a Regeneten bovine collagen implant was utilized to increase the chances of healing.  The Regeneten patch was then positioned over our repair site.  It was secured medially with PLL a staples and then laterally with 2 peek bone staples.  This had excellent fixation and covered our repair site.  This completed the rotator cuff tendon repair.  Wounds were then copiously irrigated. Portals were closed with nylon.  At the end of the case, all sponge, needle, and instrument counts were correct.  He was then placed in abduction sling with a Polar Care unit.  He was extubated and taken to PACU without complication. Please note Rosanna Allen PA-C served as the first surgical assist as there is no available qualified resident.  Her assistance in the case was critical for key portions including patient positioning and prep, assistance with anchor placement, wound closure and brace application.   Complications:  None; patient tolerated the procedure well.    Disposition: PACU - hemodynamically stable.  Condition: stable         Attending Attestation: I performed the procedure.    Ricardo Dumont  Phone Number: [phone]

## 2025-02-17 NOTE — ANESTHESIA POSTPROCEDURE EVALUATION
Patient: Mikey Salinas    Procedure Summary       Date: 02/17/25 Room / Location: [facility]    Anesthesia Start: 0910 Anesthesia Stop: 1106    Procedures:       ARTHROSCOPY, SHOULDER, WITH ROTATOR CUFF REPAIR (Right: Shoulder)      Debridement Shoulder(Beachchair vascular table, Tennet table, Arthrex implant, Regeneten) (Right: Shoulder) Diagnosis:       Unspecified rotator cuff tear or rupture of left shoulder, not specified as traumatic      Strain of muscle, fascia and tendon of other parts of biceps, unspecified arm, initial encounter      Unspecified rotator cuff tear or rupture of right shoulder, not specified as traumatic      (Unspecified rotator cuff tear or rupture of left shoulder, not specified as traumatic [M75.102])      (Strain of muscle, fascia and tendon of other parts of biceps, unspecified arm, initial encounter [S46.219A])      (Unspecified rotator cuff tear or rupture of right shoulder, not specified as traumatic [M75.101])    Surgeons: Ricardo Dumont MD Responsible Provider: Niraj Bailon DO    Anesthesia Type: general, regional ASA Status: 3            Anesthesia Type: general, regional    Vitals Value Taken Time   /90 02/17/25 1130   Temp 36.2 °C (97.2 °F) 02/17/25 1130   Pulse 74 02/17/25 1130   Resp 14 02/17/25 1130   SpO2 97 % 02/17/25 1130       Anesthesia Post Evaluation    Patient location during evaluation: bedside  Patient participation: complete - patient participated  Level of consciousness: awake and alert  Pain management: adequate  Multimodal analgesia pain management approach  Airway patency: patent  Two or more strategies used to mitigate risk of obstructive sleep apnea  Cardiovascular status: acceptable  Respiratory status: acceptable  Hydration status: acceptable  Postoperative Nausea and Vomiting: none        There were no known notable events for this encounter.

## 2025-02-28 ENCOUNTER — APPOINTMENT (OUTPATIENT)
Dept: ORTHOPEDIC SURGERY | Facility: CLINIC | Age: 60
End: 2025-02-28
Payer: COMMERCIAL

## 2025-02-28 DIAGNOSIS — Z48.89 AFTERCARE FOLLOWING SURGERY: Primary | ICD-10-CM

## 2025-02-28 PROCEDURE — 99024 POSTOP FOLLOW-UP VISIT: CPT

## 2025-02-28 NOTE — PROGRESS NOTES
Doing well, no issues. Resumed his xarelto.     Review of Systems  A complete review of systems was conducted, pertinent only to the HPI noted above.  Constitutional: None  Eyes: No additions to above history  Ears, Nose, Throat: No additions to above history  Cardiovascular: No additions to above history  Respiratory: No additions to above history  GI: No additions to above history  : No additions to above history  Skin/Neuro: No additions to above history  Endocrine/Heme/Lymph: No additions to above history  Immunologic: No additions to above history  Psychiatric: No additions to above history  Musculoskeletal: see above    Physical Exam  GEN: Alert and Oriented x 3  Constitutional: Well appearing, in no apparent distress.        Focused Musculoskeletal Exam:     RIGHT  shoulder:  portal incisions closed, no signs of infection, sutures removed steris placed, biceps incision closed, no signs of infection, biceps fires, deltoid fires.     Sensation intact Ax/median/ulnar/radial distributions  Motor intact Ax/median/radial/ulnar/AIN/PIN    Arthroscopic findings  reviewed. Doing well.  Delay start of PT by 6 weeks post op, follow up 4 weeks. All questions answered, patient in agreement with the plan.

## 2025-03-05 DIAGNOSIS — Z48.89 AFTERCARE FOLLOWING SURGERY: Primary | ICD-10-CM

## 2025-03-28 ENCOUNTER — APPOINTMENT (OUTPATIENT)
Dept: ORTHOPEDIC SURGERY | Facility: CLINIC | Age: 60
End: 2025-03-28
Payer: COMMERCIAL

## 2025-03-28 DIAGNOSIS — Z48.89 AFTERCARE FOLLOWING SURGERY: Primary | ICD-10-CM

## 2025-03-28 PROCEDURE — 99024 POSTOP FOLLOW-UP VISIT: CPT

## 2025-03-28 NOTE — PROGRESS NOTES
Doing well, starts PT next week. No issues.     Review of Systems  A complete review of systems was conducted, pertinent only to the HPI noted above.  Constitutional: None  Eyes: No additions to above history  Ears, Nose, Throat: No additions to above history  Cardiovascular: No additions to above history  Respiratory: No additions to above history  GI: No additions to above history  : No additions to above history  Skin/Neuro: No additions to above history  Endocrine/Heme/Lymph: No additions to above history  Immunologic: No additions to above history  Psychiatric: No additions to above history  Musculoskeletal: see above    Physical Exam  GEN: Alert and Oriented x 3  Constitutional: Well appearing, in no apparent distress.        Focused Musculoskeletal Exam:     RIGHT  shoulder:  portal incisions closed, no signs of infection,  biceps incision closed, no signs of infection, biceps fires, deltoid fires. PROM FE 60 ER 20    Sensation intact Ax/median/ulnar/radial distributions  Motor intact Ax/median/radial/ulnar/AIN/PIN    Doing well.  Begin PT and HEP. He can discontinue use of his sling at 6 weeks post op. follow up 6 weeks. All questions answered, patient in agreement with the plan.

## 2025-04-01 ENCOUNTER — EVALUATION (OUTPATIENT)
Dept: PHYSICAL THERAPY | Facility: CLINIC | Age: 60
End: 2025-04-01
Payer: COMMERCIAL

## 2025-04-01 DIAGNOSIS — Z48.89 AFTERCARE FOLLOWING SURGERY: ICD-10-CM

## 2025-04-01 DIAGNOSIS — S46.011D STRAIN OF RIGHT ROTATOR CUFF CAPSULE, SUBSEQUENT ENCOUNTER: Primary | ICD-10-CM

## 2025-04-01 DIAGNOSIS — M25.519 SHOULDER PAIN, UNSPECIFIED CHRONICITY, UNSPECIFIED LATERALITY: ICD-10-CM

## 2025-04-01 PROBLEM — S46.011A STRAIN OF RIGHT ROTATOR CUFF CAPSULE: Status: ACTIVE | Noted: 2025-04-01

## 2025-04-01 PROCEDURE — 97161 PT EVAL LOW COMPLEX 20 MIN: CPT | Mod: GP

## 2025-04-01 PROCEDURE — 97110 THERAPEUTIC EXERCISES: CPT | Mod: GP

## 2025-04-01 ASSESSMENT — PAIN SCALES - GENERAL: PAINLEVEL_OUTOF10: 0 - NO PAIN

## 2025-04-01 ASSESSMENT — PAIN - FUNCTIONAL ASSESSMENT: PAIN_FUNCTIONAL_ASSESSMENT: 0-10

## 2025-04-01 NOTE — PROGRESS NOTES
Physical Therapy  Physical Therapy Orthopedic Evaluation    Patient Name: Mikey Salinas  MRN: [id number]  Today's Date: 4/1/2025  Time Calculation  Start Time: 1000  Stop Time: 1040  Time Calculation (min): 40 min  PT Evaluation Time Entry  PT Evaluation (Low) Time Entry: 20   PT Therapeutic Procedures Time Entry  Therapeutic Exercise Time Entry: 20          Insurance:  Visit number: 1 of 20  Authorization info: no auth  Insurance Type: Payor: MEDICAL [facility] / Plan: [employer] [facility] HMO / Product Type: *No Product type* /      Current Problem  1. Strain of right rotator cuff capsule, subsequent encounter  Follow Up In Physical Therapy      2. Shoulder pain, unspecified chronicity, unspecified laterality  Follow Up In Physical Therapy      3. Aftercare following surgery  Referral to Physical Therapy    Follow Up In Physical Therapy          General:  General  Reason for Referral: R large RTC repair  Referred By: Rosanna Allen PA-C    Precautions:  Precautions  Precautions Comment: Scilosis, large RTC repair  Medical History Form: Reviewed (scanned into chart)    Subjective:   Subjective   Chief Complaint: Patient presents s/p large RTC repair performed by Dr. Dumont on 2/17. PT was delayed 6 weeks due to size of tear. He was given ok to wean out of sling at last ortho follow up at 6 week sofi. He has begun to wean out as tolerated. Shoulder feels very stiff and achy. Has been diligent w/ elbow and wrist ROM on his own daily since surgery.   Onset: 2/17/2025  YOON: surgical    Current Condition:   Better    Pain:  Pain Assessment: 0-10  0-10 (Numeric) Pain Score: 0 - No pain (0 at rest, up to 4 w/ movement)  Location: R shoulder  Description: ache, tightness  Aggravating Factors:  Gripping, Reaching Overhead, Reaching Behind Back, and Carrying  Relieving Factors:  Rest, Compression, and Stretching    Relevant Information (PMH & Previous Tests/Imaging):    ARTHROSCOPY, SHOULDER, WITH ROTATOR CUFF  REPAIR  [id number] - [place] SURGICAL ARTHROSCOPY SHOULDER W/ROTATOR CUFF RPR     Debridement Shoulder(Beachchair vascular table, Tennet table, Arthrex implant, Regeneten)  14371 - [place] SURGICAL ARTHROSCOPY SHOULDER XTNSV DBRDMT 3+   Findings: Large 3 cm rotator cuff tear with severe tendinopathy, biceps rupture, degenerative labrum tearing, partial upper border subscapularis tendon tear       Previous Interventions/Treatments: None    Prior Level of Function (PLOF)  Patient previously independent with all ADLs  Exercise/Physical Activity: Elbow and wrist exercises at home  Work/School: retired    Patients Living Environment: Reviewed and no concern  Primary Language: English  Patient's Goal(s) for Therapy: Recover full ROM     Red Flags: Do you have any of the following? No  Fever/chills, unexplained weight changes, dizziness/fainting, unexplained change in bowel or bladder functions, unexplained malaise or muscle weakness, night pain/sweats, numbness or tingling    Objective:   ROM    Cervical AROM (Degrees)    Flexion: 60  Extension: 50  (L) Side Bend: 40  (R) Side Bend: 40  (L) Rotation: 80  (R) Rotation: 80      Shoulder AROM (Degrees)  Deferred due to post-op status    (R)  (L)  Flexion:      Abduction:        Functional ER:        Functional IR:            Shoulder PROM (Degrees)      (R)  (L)  Flexion: 60*  wnl      Abduction: 70*  wnl     ER at 0:  15*  wnl     IR at 0:  belly  wnl         ER at 90: NT  wnl      IR at 90: NT  wnl          Elbow AROM (Degrees)      (R)  (L)  Flexion: 135  135      Extension: -1  0         Elbow PROM (Degrees)      (R)  (L)  Flexion: 135  135      Extension: 0  0             Strength Testing  Deferred due to post-op status  Shoulder    (R)  (L)  Flexion:         Abduction:        ER:         IR:             Elbow    (R)  (L)  Flexion:          Extension:            Periscapular Musculature    (R)  (L)  Upper Traps:        Middle Traps:        Lower  "Traps:        Rhomboids:            Posture: moderate rounded shoulders      Palpation: SILT, mild tenderness around portals. Normal for post-op status.       Joint Mobility: NT      Observation: Portals are well approximated and healing w/o signs or symptoms of infection.           Special Tests  Deferred due to post-op status  Cervical    Cervical flexion rotation test:   ULTT  Median:   Radial:   Ulnar:   DNF endurance:   Alar ligament:   Sharp Wally:       RTC/Impingement   Empty Can:    Drop Arm:    Painful Arc:    Lift Off Test:   AC Joint   Cross Arm Test:   Biceps   Speeds Test:   SLAP   O'Brians Test:    Bicep load II:  Instability   Anterior Apprehension:    Posterior Apprehension:    Sulcus Test:   Thoracic Outlet   Adson's Test:    Odilia Test:        Radicular Symptoms:       Outcome Measures:  Other Measures  Disability of Arm Shoulder Hand (DASH): 63.6/100, 63.6% (quickdash)     Treatments:    There-ex: 20'  PROM Scaption, abduction, ER to tolerance  Seated scap retractions w/ 3-4\" holds 2x10*  Froward and backward shoulder rolls x20*  Side to side pendulums x20*  Fwd and bwd pendulums x20*    * = added to HEP.  Sheet with exercise descriptions and images issued.  Skilled intervention utilized in the appropriate selection & application of above exercises.  Verbal and tactile cues provided for proper form and technique.  Pt. demonstrated appropriate form & verbalized understanding of optimal technique for above exercises.      [email or web address]  Access Code: [email or web address]    EDUCATION: Home exercise program, plan of care, activity modifications, pain management, and injury pathology       Assessment: Patient presents with signs and symptoms consistent with decreased ROM and right shoulder pain s/p large RTC repair, resulting in limited participation in pain-free ADLs and inability to perform at their prior level of function. Pt would benefit from physical therapy to address the " impairments found & listed previously in the objective section in order to return to safe and pain-free ADLs and prior level of function.     Stable and/or uncomplicated characteristics  None    Response to treatment: Good, no adverse reaction to PROM and exercises.     Plan:  PT Plan: Skilled PT  PT Frequency: 2 times per week  Duration: 16 weeks  Onset Date: 04/01/25  Rehab Potential: Good (due to current clinical presentation and response to session)  Plan of Care Agreement: Patient  Planned Interventions include: therapeutic exercise, self-care home management, manual therapy, therapeutic activities, gait training, neuromuscular coordination, vasopneumatic, dry needling, aquatic therapy  Goals: Set and discussed today  Active       R RTC repair       STGs       Start:  04/01/25    Expected End:  05/06/25       1. Demonstrate independence with home exercise program  2. Tolerate increased exercise without adverse reaction  3. Demonstrate improved posture & proper body mechanics throughout session  4. Increase shoulder scaption, FF and abduction ROM to at least 125 each to progress towards LTGs ad improve ADLs.  5. Increase gross shoulder strength to at least 3+ to progress towards LTGs.  6. Report decrease in pain by > 2 points to meet the MCID         LTGs       Start:  04/01/25    Expected End:  06/10/25       1. Increase FF, scaption and abduction ROM to pain free + at least 160 each  2. Increase gross strength to pain free + at least 4+ to improve ADLs and allow exercise progression  3. Decrease score of Quick DASH by > 8 points to meet the MCID  4. Perform ADLs without an increase symptoms  5. Increase gross periscapular strength to pain free + at least 3+ to decrease RTC strain and improve ADLs.  6. Increase shoulder ER to pain free + at least 60 degs to improve ADLs.                  Screening  Frequency  Date Last Completed   Spiritual and Cultural Beliefs   Screening each visit or episode of care  2/17/2025   Falls Risk Screening every ambulatory visit    Pain Screening annually at primary care visit  1/6/2025   Domestic Violence screening annually at primary care visit 1/6/2025   Depression Screening annually in the primary care setting 1/6/2025   Suicide Risk Screening annually in the primary care setting 2/17/2025   Nutrition and Food Insecurity   Screening at least annually at primary care visit     Key Learner annually in the primary care setting 2/17/2025   Drug Screen  2/17/2025  8:32 AM   Alcohol Screen  2/17/2025  8:32 AM   Advance Directive  1/6/2025       Plan of care was developed with input and agreement by the patient      Didier Lo, PT, ATC

## 2025-04-03 ENCOUNTER — TREATMENT (OUTPATIENT)
Dept: PHYSICAL THERAPY | Facility: CLINIC | Age: 60
End: 2025-04-03
Payer: COMMERCIAL

## 2025-04-03 DIAGNOSIS — Z48.89 AFTERCARE FOLLOWING SURGERY: ICD-10-CM

## 2025-04-03 DIAGNOSIS — M25.519 SHOULDER PAIN, UNSPECIFIED CHRONICITY, UNSPECIFIED LATERALITY: ICD-10-CM

## 2025-04-03 DIAGNOSIS — S46.011D STRAIN OF RIGHT ROTATOR CUFF CAPSULE, SUBSEQUENT ENCOUNTER: Primary | ICD-10-CM

## 2025-04-03 PROCEDURE — 97140 MANUAL THERAPY 1/> REGIONS: CPT | Mod: GP

## 2025-04-03 PROCEDURE — 97110 THERAPEUTIC EXERCISES: CPT | Mod: GP

## 2025-04-03 ASSESSMENT — PAIN - FUNCTIONAL ASSESSMENT: PAIN_FUNCTIONAL_ASSESSMENT: 0-10

## 2025-04-03 ASSESSMENT — PAIN SCALES - GENERAL: PAINLEVEL_OUTOF10: 0 - NO PAIN

## 2025-04-03 NOTE — PROGRESS NOTES
Physical Therapy  Physical Therapy Treatment    Patient Name: Mikey Salinas  MRN: [id number]  Today's Date: 4/3/2025  Time Calculation  Start Time: 0915  Stop Time: 0954  Time Calculation (min): 39 min      PT Therapeutic Procedures Time Entry  Manual Therapy Time Entry: 15  Therapeutic Exercise Time Entry: 24          Insurance:  Visit number: 2 of 20  Authorization info: no auth  Insurance Type: Payor: MEDICAL [facility] / Plan: [facility] HMO / Product Type: *No Product type* /      Current Problem  1. Strain of right rotator cuff capsule, subsequent encounter        2. Shoulder pain, unspecified chronicity, unspecified laterality        3. Aftercare following surgery            General:  General  Reason for Referral: R large RTC repair  Referred By: Rosanna Allen PA-C    Precautions:  Precautions  Precautions Comment: Scilosis, large RTC repair  Medical History Form: Reviewed (scanned into chart)    Subjective:   Patient reports he is feeling ok today. Little sore from performing exercises. Has been focusing on making pendulums as passive as possible. Has been getting discomfort w/ forward shoulder rolls. Eliminated from HEP, will continue w/ backward shoulder rolls only.     Pain:  Pain Assessment: 0-10  0-10 (Numeric) Pain Score: 0 - No pain (0 at rest, 3-4 w/ movement)    ARTHROSCOPY, SHOULDER, WITH ROTATOR CUFF REPAIR  61859 - [place] SURGICAL ARTHROSCOPY SHOULDER W/ROTATOR CUFF RPR     Debridement Shoulder(Beachchair vascular table, Tennet table, Arthrex implant, Regeneten)  55247 - [place] SURGICAL ARTHROSCOPY SHOULDER XTNSV DBRDMT 3+   Findings: Large 3 cm rotator cuff tear with severe tendinopathy, biceps rupture, degenerative labrum tearing, partial upper border subscapularis tendon tear         Objective:   ROM    Cervical AROM (Degrees)    Flexion: 60  Extension: 50  (L) Side Bend: 40  (R) Side Bend: 40  (L) Rotation: 80  (R) Rotation: 80      Shoulder AROM (Degrees)  Deferred due to  post-op status    (R)  (L)  Flexion:      Abduction:        Functional ER:        Functional IR:            Shoulder PROM (Degrees)       (R)  (L)  Flexion: 60*  wnl      Abduction: 70*  wnl     ER at 0:  15*  wnl     IR at 0:  belly  wnl         ER at 90: NT  wnl      IR at 90: NT  wnl          Elbow AROM (Degrees)      (R)  (L)  Flexion: 135  135      Extension: -1  0         Elbow PROM (Degrees)      (R)  (L)  Flexion: 135  135      Extension: 0  0             Strength Testing  Deferred due to post-op status  Shoulder    (R)  (L)  Flexion:         Abduction:        ER:         IR:             Elbow    (R)  (L)  Flexion:          Extension:            Periscapular Musculature    (R)  (L)  Upper Traps:        Middle Traps:        Lower Traps:        Rhomboids:            Posture: moderate rounded shoulders      Palpation: SILT, mild tenderness around portals. Normal for post-op status.       Joint Mobility: NT      Observation: Portals are well approximated and healing w/o signs or symptoms of infection.           Special Tests  Deferred due to post-op status  Cervical    Cervical flexion rotation test:   ULTT  Median:   Radial:   Ulnar:   DNF endurance:   Alar ligament:   Sharp Wally:       RTC/Impingement   Empty Can:    Drop Arm:    Painful Arc:    Lift Off Test:   AC Joint   Cross Arm Test:   Biceps   Speeds Test:   SLAP   O'Brians Test:    Bicep load II:  Instability   Anterior Apprehension:    Posterior Apprehension:    Sulcus Test:   Thoracic Outlet   Adson's Test:    Odilia Test:        Radicular Symptoms:       Outcome Measures:        Treatments:    Manual Therapy: 15'  IASTM biceps, triceps soft tissue, posterior cuff w/ theragun     There-ex: 24'  PROM Scaption, abduction, ER to tolerance following precautions  Seated abduction physio-ball rollouts to tolerance 2x15    [email or web address]  Access Code: [email or web address]    EDUCATION: Home exercise program, plan of care, activity  modifications, pain management, and injury pathology       Assessment:   Patient tolerated today's session w/ expected muscle soreness. Did well w/ passive ranging and manual therapy in today's session. Patient will benefit from ongoing PT services to continue to progress shoulder ROM, and to initiate RTC and scapular strengthening when appropriate per post-op protocol to reach established goals to return to PLOF.       Plan:  Continue PROM progression as tolerated and manual therapy prn for symptom control.     Goals: Set and discussed today  Active       R RTC repair       STGs       Start:  04/01/25    Expected End:  05/06/25       1. Demonstrate independence with home exercise program  2. Tolerate increased exercise without adverse reaction  3. Demonstrate improved posture & proper body mechanics throughout session  4. Increase shoulder scaption, FF and abduction ROM to at least 125 each to progress towards LTGs ad improve ADLs.  5. Increase gross shoulder strength to at least 3+ to progress towards LTGs.  6. Report decrease in pain by > 2 points to meet the MCID         LTGs       Start:  04/01/25    Expected End:  06/10/25       1. Increase FF, scaption and abduction ROM to pain free + at least 160 each  2. Increase gross strength to pain free + at least 4+ to improve ADLs and allow exercise progression  3. Decrease score of Quick DASH by > 8 points to meet the MCID  4. Perform ADLs without an increase symptoms  5. Increase gross periscapular strength to pain free + at least 3+ to decrease RTC strain and improve ADLs.  6. Increase shoulder ER to pain free + at least 60 degs to improve ADLs.                  Screening  Frequency  Date Last Completed   Spiritual and Cultural Beliefs   Screening each visit or episode of care 2/17/2025   Falls Risk Screening every ambulatory visit    Pain Screening annually at primary care visit  1/6/2025   Domestic Violence screening annually at primary care visit 1/6/2025    Depression Screening annually in the primary care setting 1/6/2025   Suicide Risk Screening annually in the primary care setting 2/17/2025   Nutrition and Food Insecurity   Screening at least annually at primary care visit     Key Learner annually in the primary care setting 2/17/2025   Drug Screen  2/17/2025  8:32 AM   Alcohol Screen  2/17/2025  8:32 AM   Advance Directive  1/6/2025       Plan of care was developed with input and agreement by the patient      Didier Lo, PT, ATC

## 2025-04-07 ENCOUNTER — TREATMENT (OUTPATIENT)
Dept: PHYSICAL THERAPY | Facility: CLINIC | Age: 60
End: 2025-04-07
Payer: COMMERCIAL

## 2025-04-07 DIAGNOSIS — M25.519 SHOULDER PAIN, UNSPECIFIED CHRONICITY, UNSPECIFIED LATERALITY: ICD-10-CM

## 2025-04-07 DIAGNOSIS — Z48.89 AFTERCARE FOLLOWING SURGERY: ICD-10-CM

## 2025-04-07 DIAGNOSIS — S46.011D STRAIN OF RIGHT ROTATOR CUFF CAPSULE, SUBSEQUENT ENCOUNTER: Primary | ICD-10-CM

## 2025-04-07 PROCEDURE — 97110 THERAPEUTIC EXERCISES: CPT | Mod: GP

## 2025-04-07 ASSESSMENT — PAIN SCALES - GENERAL: PAINLEVEL_OUTOF10: 0 - NO PAIN

## 2025-04-07 ASSESSMENT — PAIN - FUNCTIONAL ASSESSMENT: PAIN_FUNCTIONAL_ASSESSMENT: 0-10

## 2025-04-07 NOTE — PROGRESS NOTES
Physical Therapy  Physical Therapy Treatment    Patient Name: Mikey Salinas  MRN: [id number]  Today's Date: 4/7/2025  Time Calculation  Start Time: 0930  Stop Time: 1010  Time Calculation (min): 40 min      PT Therapeutic Procedures Time Entry  Therapeutic Exercise Time Entry: 40          Insurance:  Visit number: 3 of 20  Authorization info: no auth  Insurance Type: Payor: MEDICAL [facility] / Plan: [facility] HMO / Product Type: *No Product type* /      Current Problem  1. Strain of right rotator cuff capsule, subsequent encounter  Follow Up In Physical Therapy      2. Shoulder pain, unspecified chronicity, unspecified laterality  Follow Up In Physical Therapy      3. Aftercare following surgery  Follow Up In Physical Therapy          General:  General  Reason for Referral: R large RTC repair  Referred By: Rosanna Allen PA-C    Precautions:  Precautions  Precautions Comment: Scilosis, large RTC repair  Medical History Form: Reviewed (scanned into chart)    Subjective:   Patient reports he is feeling a little better today. Using sling less and less. HEP getting easier and more tolerable.     Pain:  Pain Assessment: 0-10  0-10 (Numeric) Pain Score: 0 - No pain (3-4 w/ movement)    ARTHROSCOPY, SHOULDER, WITH ROTATOR CUFF REPAIR  [id number] - [place] SURGICAL ARTHROSCOPY SHOULDER W/ROTATOR CUFF RPR     Debridement Shoulder(Beachchair vascular table, Tennet table, Arthrex implant, Regeneten)  [id number] - [place] SURGICAL ARTHROSCOPY SHOULDER XTNSV DBRDMT 3+   Findings: Large 3 cm rotator cuff tear with severe tendinopathy, biceps rupture, degenerative labrum tearing, partial upper border subscapularis tendon tear         Objective:   ROM    Cervical AROM (Degrees)    Flexion: 60  Extension: 50  (L) Side Bend: 40  (R) Side Bend: 40  (L) Rotation: 80  (R) Rotation: 80      Shoulder AROM (Degrees)  Deferred due to post-op status    (R)  (L)  Flexion:      Abduction:        Functional ER:        Functional  IR:            Shoulder PROM (Degrees) updated 4/7      (R)  (L)  Flexion: 60*  wnl      Abduction: 80*  wnl     ER at 0:  20*  wnl     IR at 0:  belly  wnl         ER at 90: NT  wnl      IR at 90: NT  wnl          Elbow AROM (Degrees)      (R)  (L)  Flexion: 135  135      Extension: -1  0         Elbow PROM (Degrees)      (R)  (L)  Flexion: 135  135      Extension: 0  0             Strength Testing  Deferred due to post-op status  Shoulder    (R)  (L)  Flexion:         Abduction:        ER:         IR:             Elbow    (R)  (L)  Flexion:          Extension:            Periscapular Musculature    (R)  (L)  Upper Traps:        Middle Traps:        Lower Traps:        Rhomboids:            Posture: moderate rounded shoulders      Palpation: SILT, mild tenderness around portals. Normal for post-op status.       Joint Mobility: NT      Observation: Portals are well approximated and healing w/o signs or symptoms of infection.           Special Tests  Deferred due to post-op status  Cervical    Cervical flexion rotation test:   ULTT  Median:   Radial:   Ulnar:   DNF endurance:   Alar ligament:   Sharp Wally:       RTC/Impingement   Empty Can:    Drop Arm:    Painful Arc:    Lift Off Test:   AC Joint   Cross Arm Test:   Biceps   Speeds Test:   SLAP   O'Kadeemians Test:    Bicep load II:  Instability   Anterior Apprehension:    Posterior Apprehension:    Sulcus Test:   Thoracic Outlet   Adson's Test:    Odilia Test:        Radicular Symptoms:       Outcome Measures:        Treatments:    Manual Therapy: 0'      There-ex: 40'  PROM Scaption, abduction, ER to tolerance following precautions  Seated scaption physio-ball rollouts to tolerance 2x20*  Standing scaption pulleys x5'*  Wand ER AAROM in seated following precautions 2x15*    * = added to HEP.  Sheet with exercise descriptions and images issued.  Skilled intervention utilized in the appropriate selection & application of above exercises.  Verbal and tactile cues  provided for proper form and technique.  Pt. demonstrated appropriate form & verbalized understanding of optimal technique for above exercises.'    [email or web address]  Access Code: [email or web address]    EDUCATION: Home exercise program, plan of care, activity modifications, pain management, and injury pathology       Assessment:   Patient tolerated today's session w/ expected muscle soreness. Demonstrates improvements in activity tolerance and shoulder ROM displayed through improved tolerance to passive ranging and initiation of pulleys w/o adverse reaction. Patient is progressing slowly s/p large RTC repair and will benefit from ongoing PT services to continue to progress shoulder ROM and in to reach established goals to return to PLOF.       Plan:  Continue PROM progression as tolerated and manual therapy prn for symptom control.     Goals: Set and discussed today  Active       R RTC repair       STGs       Start:  04/01/25    Expected End:  05/06/25       1. Demonstrate independence with home exercise program  2. Tolerate increased exercise without adverse reaction  3. Demonstrate improved posture & proper body mechanics throughout session  4. Increase shoulder scaption, FF and abduction ROM to at least 125 each to progress towards LTGs ad improve ADLs.  5. Increase gross shoulder strength to at least 3+ to progress towards LTGs.  6. Report decrease in pain by > 2 points to meet the MCID         LTGs       Start:  04/01/25    Expected End:  06/10/25       1. Increase FF, scaption and abduction ROM to pain free + at least 160 each  2. Increase gross strength to pain free + at least 4+ to improve ADLs and allow exercise progression  3. Decrease score of Quick DASH by > 8 points to meet the MCID  4. Perform ADLs without an increase symptoms  5. Increase gross periscapular strength to pain free + at least 3+ to decrease RTC strain and improve ADLs.  6. Increase shoulder ER to pain free + at least 60  degs to improve ADLs.                  Screening  Frequency  Date Last Completed   Spiritual and Cultural Beliefs   Screening each visit or episode of care 2/17/2025   Falls Risk Screening every ambulatory visit    Pain Screening annually at primary care visit  1/6/2025   Domestic Violence screening annually at primary care visit 1/6/2025   Depression Screening annually in the primary care setting 1/6/2025   Suicide Risk Screening annually in the primary care setting 2/17/2025   Nutrition and Food Insecurity   Screening at least annually at primary care visit     Key Learner annually in the primary care setting 2/17/2025   Drug Screen  2/17/2025  8:32 AM   Alcohol Screen  2/17/2025  8:32 AM   Advance Directive  1/6/2025       Plan of care was developed with input and agreement by the patient      Didier Lo, PT, ATC

## 2025-04-10 ENCOUNTER — TREATMENT (OUTPATIENT)
Dept: PHYSICAL THERAPY | Facility: CLINIC | Age: 60
End: 2025-04-10
Payer: COMMERCIAL

## 2025-04-10 DIAGNOSIS — Z48.89 AFTERCARE FOLLOWING SURGERY: ICD-10-CM

## 2025-04-10 DIAGNOSIS — M25.519 SHOULDER PAIN, UNSPECIFIED CHRONICITY, UNSPECIFIED LATERALITY: Primary | ICD-10-CM

## 2025-04-10 DIAGNOSIS — S46.011D STRAIN OF RIGHT ROTATOR CUFF CAPSULE, SUBSEQUENT ENCOUNTER: ICD-10-CM

## 2025-04-10 PROCEDURE — 97110 THERAPEUTIC EXERCISES: CPT | Mod: GP

## 2025-04-10 ASSESSMENT — PAIN SCALES - GENERAL: PAINLEVEL_OUTOF10: 1

## 2025-04-10 ASSESSMENT — PAIN - FUNCTIONAL ASSESSMENT: PAIN_FUNCTIONAL_ASSESSMENT: 0-10

## 2025-04-10 NOTE — PROGRESS NOTES
Physical Therapy  Physical Therapy Treatment    Patient Name: Mikey Salinas  MRN: [id number]  Today's Date: 4/10/2025  Time Calculation  Start Time: 1000  Stop Time: 1043  Time Calculation (min): 43 min      PT Therapeutic Procedures Time Entry  Manual Therapy Time Entry: 5  Therapeutic Exercise Time Entry: 38          Insurance:  Visit number: 4 of 20  Authorization info: no auth  Insurance Type: Payor: MEDICAL [facility] / Plan: MEDICAL [facility] HMO / Product Type: *No Product type* /      Current Problem  1. Shoulder pain, unspecified chronicity, unspecified laterality  Follow Up In Physical Therapy      2. Aftercare following surgery  Follow Up In Physical Therapy      3. Strain of right rotator cuff capsule, subsequent encounter  Follow Up In Physical Therapy          General:  General  Reason for Referral: R large RTC repair  Referred By: Rosanna Allen PA-C    Precautions:  Precautions  Precautions Comment: Scilosis, large RTC repair  Medical History Form: Reviewed (scanned into chart)    Subjective:   Patient reports he is feeling better. Purchased pulleys and has been using at home w/o issue. Pain is improving, having trouble w/ hand cramping at night and first thing in AM.     Pain:  Pain Assessment: 0-10  0-10 (Numeric) Pain Score: 1    ARTHROSCOPY, SHOULDER, WITH ROTATOR CUFF REPAIR  [id number] - [place] SURGICAL ARTHROSCOPY SHOULDER W/ROTATOR CUFF RPR     Debridement Shoulder(Beachchair vascular table, Tennet table, Arthrex implant, Regeneten)  [id number] - [place] SURGICAL ARTHROSCOPY SHOULDER XTNSV DBRDMT 3+   Findings: Large 3 cm rotator cuff tear with severe tendinopathy, biceps rupture, degenerative labrum tearing, partial upper border subscapularis tendon tear         Objective:   ROM  *denotes end range pain/discomfort    Cervical AROM (Degrees)    Flexion: 60  Extension: 50  (L) Side Bend: 40  (R) Side Bend: 40  (L) Rotation: 80  (R) Rotation: 80      Shoulder AROM (Degrees)  Deferred due to  post-op status    (R)  (L)  Flexion:      Abduction:        Functional ER:        Functional IR:            Shoulder PROM (Degrees) updated 4/10      (R)  (L)  Flexion: 90*  wnl      Abduction: 90*  wnl     ER at 0:  20*  wnl     IR at 0:  belly  wnl         ER at 90: NT  wnl      IR at 90: NT  wnl          Elbow AROM (Degrees)      (R)  (L)  Flexion: 135  135      Extension: -1  0         Elbow PROM (Degrees)      (R)  (L)  Flexion: 135  135      Extension: 0  0             Strength Testing  Deferred due to post-op status  Shoulder    (R)  (L)  Flexion:         Abduction:        ER:         IR:             Elbow    (R)  (L)  Flexion:          Extension:            Periscapular Musculature    (R)  (L)  Upper Traps:        Middle Traps:        Lower Traps:        Rhomboids:            Posture: moderate rounded shoulders      Palpation: SILT, mild tenderness around portals. Normal for post-op status.       Joint Mobility: NT      Observation: Portals are well approximated and healing w/o signs or symptoms of infection.           Special Tests  Deferred due to post-op status  Cervical    Cervical flexion rotation test:   ULTT  Median:   Radial:   Ulnar:   DNF endurance:   Alar ligament:   Sharp Wally:       RTC/Impingement   Empty Can:    Drop Arm:    Painful Arc:    Lift Off Test:   AC Joint   Cross Arm Test:   Biceps   Speeds Test:   SLAP   O'Nalini Test:    Bicep load II:  Instability   Anterior Apprehension:    Posterior Apprehension:    Sulcus Test:   Thoracic Outlet   Adson's Test:    Odilia Test:        Radicular Symptoms:       Outcome Measures:        Treatments:    Manual Therapy: 5'  STM posterior cuff     There-ex: 38'  PROM Scaption, abduction, ER to tolerance following precautions  Supine wand AAROM and prayer  AAROM, poor tolerance, discontinued after 2-3 reps each  Elbow supported wrist curls w/ 1# x20*  Elbow supported pronation/supination w/ 1# x20*  Elbow supported wrist extension w/ 1#  "x20*  Seated wrist flexion stretch 2x30\"*  Seated wrist extension stretch 2x30\"*  Seated physioball scaption to tolerance 2x20    * = added to HEP.  Sheet with exercise descriptions and images issued.  Skilled intervention utilized in the appropriate selection & application of above exercises.  Verbal and tactile cues provided for proper form and technique.  Pt. demonstrated appropriate form & verbalized understanding of optimal technique for above exercises.'    [email or web address]  Access Code: [email or web address]    EDUCATION: Home exercise program, plan of care, activity modifications, pain management, and injury pathology       Assessment:   Patient tolerated today's session w/ expected muscle fatigue and soreness. Demonstrates improvements in shoulder PROM and activity tolerance. Continues to have difficulty w/ pain control and is unable to tolerate much AAROM. Patient will benefit from ongoing PT services to continue to progress shoulder ROM and initiate strengthening when appropriate per post-op large RTC repair protocol to reach established goals to maximize functional mobility.       Plan:  Continue PROM progression as tolerated and manual therapy prn for symptom control.     Goals: Set and discussed today  Active       R RTC repair       STGs       Start:  04/01/25    Expected End:  05/06/25       1. Demonstrate independence with home exercise program  2. Tolerate increased exercise without adverse reaction  3. Demonstrate improved posture & proper body mechanics throughout session  4. Increase shoulder scaption, FF and abduction ROM to at least 125 each to progress towards LTGs ad improve ADLs.  5. Increase gross shoulder strength to at least 3+ to progress towards LTGs.  6. Report decrease in pain by > 2 points to meet the MCID         LTGs       Start:  04/01/25    Expected End:  06/10/25       1. Increase FF, scaption and abduction ROM to pain free + at least 160 each  2. Increase gross " strength to pain free + at least 4+ to improve ADLs and allow exercise progression  3. Decrease score of Quick DASH by > 8 points to meet the MCID  4. Perform ADLs without an increase symptoms  5. Increase gross periscapular strength to pain free + at least 3+ to decrease RTC strain and improve ADLs.  6. Increase shoulder ER to pain free + at least 60 degs to improve ADLs.                  Screening  Frequency  Date Last Completed   Spiritual and Cultural Beliefs   Screening each visit or episode of care 2/17/2025   Falls Risk Screening every ambulatory visit    Pain Screening annually at primary care visit  1/6/2025   Domestic Violence screening annually at primary care visit 1/6/2025   Depression Screening annually in the primary care setting 1/6/2025   Suicide Risk Screening annually in the primary care setting 2/17/2025   Nutrition and Food Insecurity   Screening at least annually at primary care visit     Key Learner annually in the primary care setting 2/17/2025   Drug Screen  2/17/2025  8:32 AM   Alcohol Screen  2/17/2025  8:32 AM   Advance Directive  1/6/2025       Plan of care was developed with input and agreement by the patient      Didier Lo, PT, ATC

## 2025-04-13 NOTE — PROGRESS NOTES
Physical Therapy  Physical Therapy Treatment    Patient Name: Mikey Salinas  MRN: [id number]  Today's Date: 4/14/2025  Time Calculation  Start Time: 1045  Stop Time: 1130  Time Calculation (min): 45 min      PT Therapeutic Procedures Time Entry  Manual Therapy Time Entry: 15  Therapeutic Exercise Time Entry: 28          Insurance:  Visit number: 5 of 20  Authorization info: no auth  Insurance Type: Payor: MEDICAL [facility] / Plan: [employer] [facility] HMO / Product Type: *No Product type* /      Current Problem  1. Shoulder pain, unspecified chronicity, unspecified laterality  Follow Up In Physical Therapy      2. Aftercare following surgery  Follow Up In Physical Therapy      3. Strain of right rotator cuff capsule, subsequent encounter  Follow Up In Physical Therapy          General:       Precautions:     Medical History Form: Reviewed (scanned into chart)    Subjective:   Patient reports that he only has 1-2/10 at rest.  Hand is still stiff when waking up.      Pain:       ARTHROSCOPY, SHOULDER, WITH ROTATOR CUFF REPAIR  35462 - [place] SURGICAL ARTHROSCOPY SHOULDER W/ROTATOR CUFF RPR     Debridement Shoulder(Beachchair vascular table, Tennet table, Arthrex implant, Regeneten)  25367 - [place] SURGICAL ARTHROSCOPY SHOULDER XTNSV DBRDMT 3+   Findings: Large 3 cm rotator cuff tear with severe tendinopathy, biceps rupture, degenerative labrum tearing, partial upper border subscapularis tendon tear         Objective:   ROM  *denotes end range pain/discomfort    Cervical AROM (Degrees)    Flexion: 60  Extension: 50  (L) Side Bend: 40  (R) Side Bend: 40  (L) Rotation: 80  (R) Rotation: 80      Shoulder AROM (Degrees)  Deferred due to post-op status    (R)  (L)  Flexion:      Abduction:        Functional ER:        Functional IR:            Shoulder PROM (Degrees) updated 4/10      (R)  (L)  Flexion: 90*  wnl      Abduction: 90*  wnl     ER at 0:  20*  wnl     IR at 0:  belly  wnl         ER at 90: NT  wnl      IR  "at 90: NT  wnl          Elbow AROM (Degrees)      (R)  (L)  Flexion: 135  135      Extension: -1  0         Elbow PROM (Degrees)      (R)  (L)  Flexion: 135  135      Extension: 0  0             Strength Testing  Deferred due to post-op status  Shoulder    (R)  (L)  Flexion:         Abduction:        ER:         IR:             Elbow    (R)  (L)  Flexion:          Extension:            Periscapular Musculature    (R)  (L)  Upper Traps:        Middle Traps:        Lower Traps:        Rhomboids:            Posture: moderate rounded shoulders      Palpation: SILT, mild tenderness around portals. Normal for post-op status.       Joint Mobility: NT      Observation: Portals are well approximated and healing w/o signs or symptoms of infection.           Special Tests  Deferred due to post-op status  Cervical    Cervical flexion rotation test:   ULTT  Median:   Radial:   Ulnar:   DNF endurance:   Alar ligament:   Sharp Wally:       RTC/Impingement   Empty Can:    Drop Arm:    Painful Arc:    Lift Off Test:   AC Joint   Cross Arm Test:   Biceps   Speeds Test:   SLAP   O'Kadeemians Test:    Bicep load II:  Instability   Anterior Apprehension:    Posterior Apprehension:    Sulcus Test:   Thoracic Outlet   Adson's Test:    Odilia Test:        Radicular Symptoms:       Outcome Measures:        Treatments:    Manual Therapy: 5'  STM posterior cuff     There-ex: 38'  PROM Scaption, abduction, ER to tolerance following precautions  Supine self assisted flexion 2 x 5   Standing ball rolls on table   Elbow supported wrist curls w/ 1# x20*  Elbow supported pronation/supination w/ 1# x20*  Elbow supported wrist extension w/ 1# x20*  Seated wrist flexion stretch 2x30\"*  Seated wrist extension stretch 2x30\"*  Seated physioball scaption to tolerance 2x20  Seated  with towel roll x 20   Pulleys standing scaption (thumb up)    * = added to HEP.  Sheet with exercise descriptions and images issued.  Skilled intervention utilized in the " appropriate selection & application of above exercises.  Verbal and tactile cues provided for proper form and technique.  Pt. demonstrated appropriate form & verbalized understanding of optimal technique for above exercises.'    [email or web address]  Access Code: [email or web address]    EDUCATION: Home exercise program, plan of care, activity modifications, pain management, and injury pathology       Assessment:   Patient tolerated today's session w/ expected muscle fatigue and soreness.  Patient was able to perform self assisted flexion in supine without increase pain today.  We got to 90* flexion without increased pain today.  Not able to tolerated seated pulleys yet.  Patient will benefit from ongoing PT services to continue to progress shoulder ROM and initiate strengthening when appropriate per post-op large RTC repair protocol to reach established goals to maximize functional mobility.       Plan:  Continue PROM progression as tolerated and manual therapy prn for symptom control.     Goals: Set and discussed today  Active       R RTC repair       STGs       Start:  04/01/25    Expected End:  05/06/25       1. Demonstrate independence with home exercise program  2. Tolerate increased exercise without adverse reaction  3. Demonstrate improved posture & proper body mechanics throughout session  4. Increase shoulder scaption, FF and abduction ROM to at least 125 each to progress towards LTGs ad improve ADLs.  5. Increase gross shoulder strength to at least 3+ to progress towards LTGs.  6. Report decrease in pain by > 2 points to meet the MCID         LTGs       Start:  04/01/25    Expected End:  06/10/25       1. Increase FF, scaption and abduction ROM to pain free + at least 160 each  2. Increase gross strength to pain free + at least 4+ to improve ADLs and allow exercise progression  3. Decrease score of Quick DASH by > 8 points to meet the MCID  4. Perform ADLs without an increase symptoms  5.  Increase gross periscapular strength to pain free + at least 3+ to decrease RTC strain and improve ADLs.  6. Increase shoulder ER to pain free + at least 60 degs to improve ADLs.                  Screening  Frequency  Date Last Completed   Spiritual and Cultural Beliefs   Screening each visit or episode of care 2/17/2025   Falls Risk Screening every ambulatory visit    Pain Screening annually at primary care visit  1/6/2025   Domestic Violence screening annually at primary care visit 1/6/2025   Depression Screening annually in the primary care setting 1/6/2025   Suicide Risk Screening annually in the primary care setting 2/17/2025   Nutrition and Food Insecurity   Screening at least annually at primary care visit     Key Learner annually in the primary care setting 2/17/2025   Drug Screen  2/17/2025  8:32 AM   Alcohol Screen  2/17/2025  8:32 AM   Advance Directive  1/6/2025       Plan of care was developed with input and agreement by the patient      Liudmila Cortes PT

## 2025-04-14 ENCOUNTER — TREATMENT (OUTPATIENT)
Dept: PHYSICAL THERAPY | Facility: CLINIC | Age: 60
End: 2025-04-14
Payer: COMMERCIAL

## 2025-04-14 DIAGNOSIS — M25.519 SHOULDER PAIN, UNSPECIFIED CHRONICITY, UNSPECIFIED LATERALITY: Primary | ICD-10-CM

## 2025-04-14 DIAGNOSIS — Z48.89 AFTERCARE FOLLOWING SURGERY: ICD-10-CM

## 2025-04-14 DIAGNOSIS — S46.011D STRAIN OF RIGHT ROTATOR CUFF CAPSULE, SUBSEQUENT ENCOUNTER: ICD-10-CM

## 2025-04-14 PROCEDURE — 97110 THERAPEUTIC EXERCISES: CPT | Mod: GP,CQ

## 2025-04-14 PROCEDURE — 97140 MANUAL THERAPY 1/> REGIONS: CPT | Mod: GP,CQ

## 2025-04-15 ENCOUNTER — OFFICE VISIT (OUTPATIENT)
Dept: ORTHOPEDIC SURGERY | Facility: CLINIC | Age: 60
End: 2025-04-15
Payer: COMMERCIAL

## 2025-04-15 DIAGNOSIS — M25.641 STIFFNESS OF HAND JOINT, RIGHT: Primary | ICD-10-CM

## 2025-04-15 PROCEDURE — 99211 OFF/OP EST MAY X REQ PHY/QHP: CPT | Performed by: STUDENT IN AN ORGANIZED HEALTH CARE EDUCATION/TRAINING PROGRAM

## 2025-04-15 NOTE — PROGRESS NOTES
Returns now about 6 weeks out from his rotator cuff repair.  He is a bit concerned because he is having some stiffness and swelling in his hand but no injury.  This all began as he started increasing with they were doing with physical therapy.    Review of Systems  A complete review of systems was conducted, pertinent only to the HPI noted above.  Constitutional: None  Eyes: No additions to above history  Ears, Nose, Throat: No additions to above history  Cardiovascular: No additions to above history  Respiratory: No additions to above history  GI: No additions to above history  : No additions to above history  Skin/Neuro: No additions to above history  Endocrine/Heme/Lymph: No additions to above history  Immunologic: No additions to above history  Psychiatric: No additions to above history  Musculoskeletal: see above    Physical Exam  GEN: Alert and Oriented x 3  Constitutional: Well appearing, in no apparent distress.        Focused Musculoskeletal Exam:     RIGHT  shoulder:  Shoulder exam deferred right hand was examined there is some swelling along the base of the thumb as well as some stiffness of his IP joint and swelling of his thumb.  Swelling does not progress proximally there is no pitting edema    Sensation intact Ax/median/ulnar/radial distributions  Motor intact Ax/median/radial/ulnar/AIN/PIN    I did review with the patient based on his clinical exam he likely has some degenerative changes in his thumb joint.  Did offer x-rays but I do not think this will change a whole lot.  Encouraged passive and active range of motion of his hand, squeezing a ball.  He will follow-up next week at his regularly scheduled appointment for the shoulder.  No concern for blood clots, patient is also on Xarelto.  All questions answered, patient in agreement with the plan.

## 2025-04-17 ENCOUNTER — TREATMENT (OUTPATIENT)
Dept: PHYSICAL THERAPY | Facility: CLINIC | Age: 60
End: 2025-04-17
Payer: COMMERCIAL

## 2025-04-17 DIAGNOSIS — Z48.89 AFTERCARE FOLLOWING SURGERY: ICD-10-CM

## 2025-04-17 DIAGNOSIS — S46.011D STRAIN OF RIGHT ROTATOR CUFF CAPSULE, SUBSEQUENT ENCOUNTER: Primary | ICD-10-CM

## 2025-04-17 DIAGNOSIS — M25.519 SHOULDER PAIN, UNSPECIFIED CHRONICITY, UNSPECIFIED LATERALITY: ICD-10-CM

## 2025-04-17 PROCEDURE — 97110 THERAPEUTIC EXERCISES: CPT | Mod: GP

## 2025-04-17 ASSESSMENT — PAIN - FUNCTIONAL ASSESSMENT: PAIN_FUNCTIONAL_ASSESSMENT: 0-10

## 2025-04-17 ASSESSMENT — PAIN SCALES - GENERAL: PAINLEVEL_OUTOF10: 0 - NO PAIN

## 2025-04-17 NOTE — PROGRESS NOTES
Physical Therapy  Physical Therapy Treatment    Patient Name: Mikey Salinas  MRN: [id number]  Today's Date: 4/17/2025  Time Calculation  Start Time: 0915  Stop Time: 0957  Time Calculation (min): 42 min      PT Therapeutic Procedures Time Entry  Therapeutic Exercise Time Entry: 42          Insurance:  Visit number: 6 of 20  Authorization info: no auth  Insurance Type: Payor: MEDICAL [employer] [facility] / Plan: [facility] HMO / Product Type: *No Product type* /      Current Problem  1. Strain of right rotator cuff capsule, subsequent encounter  Follow Up In Physical Therapy      2. Shoulder pain, unspecified chronicity, unspecified laterality  Follow Up In Physical Therapy      3. Aftercare following surgery  Follow Up In Physical Therapy          General:  General  Reason for Referral: R large RTC repair  Referred By: Rosanna Allen PA-C    Precautions:  Precautions  Precautions Comment: Scilosis, large RTC repair  Medical History Form: Reviewed (scanned into chart)    Subjective:   Patient reports he is feeling better. Hand is less stiff and achy, checked in w/ ortho for hand. Ok to continue PT as tolerated, following HEP and precautions.     Pain:  Pain Assessment: 0-10  0-10 (Numeric) Pain Score: 0 - No pain (0 at rest, mild w/ activities)    ARTHROSCOPY, SHOULDER, WITH ROTATOR CUFF REPAIR  19904 - [place] SURGICAL ARTHROSCOPY SHOULDER W/ROTATOR CUFF RPR     Debridement Shoulder(Beachchair vascular table, Tennet table, Arthrex implant, Regeneten)  46509 - [place] SURGICAL ARTHROSCOPY SHOULDER XTNSV DBRDMT 3+   Findings: Large 3 cm rotator cuff tear with severe tendinopathy, biceps rupture, degenerative labrum tearing, partial upper border subscapularis tendon tear         Objective:   ROM  *denotes end range pain/discomfort    Cervical AROM (Degrees)    Flexion: 60  Extension: 50  (L) Side Bend: 40  (R) Side Bend: 40  (L) Rotation: 80  (R) Rotation: 80      Shoulder AROM (Degrees)  Deferred due to post-op  "status    (R)  (L)  Flexion:      Abduction:        Functional ER:        Functional IR:            Shoulder PROM (Degrees) updated 4/10       (R)  (L)  Flexion: 90*  wnl      Abduction: 90*  wnl     ER at 0:  20*  wnl     IR at 0:  belly  wnl         ER at 90: NT  wnl      IR at 90: NT  wnl          Elbow AROM (Degrees)      (R)  (L)  Flexion: 135  135      Extension: -1  0         Elbow PROM (Degrees)      (R)  (L)  Flexion: 135  135      Extension: 0  0             Strength Testing  Deferred due to post-op status  Shoulder    (R)  (L)  Flexion:         Abduction:        ER:         IR:             Elbow    (R)  (L)  Flexion:          Extension:            Periscapular Musculature    (R)  (L)  Upper Traps:        Middle Traps:        Lower Traps:        Rhomboids:            Posture: moderate rounded shoulders      Palpation: SILT, mild tenderness around portals. Normal for post-op status.       Joint Mobility: NT      Observation: Portals are well approximated and healing w/o signs or symptoms of infection.           Special Tests  Deferred due to post-op status  Cervical    Cervical flexion rotation test:   ULTT  Median:   Radial:   Ulnar:   DNF endurance:   Alar ligament:   Sharp Wally:       RTC/Impingement   Empty Can:    Drop Arm:    Painful Arc:    Lift Off Test:   AC Joint   Cross Arm Test:   Biceps   Speeds Test:   SLAP   O'Kadeemians Test:    Bicep load II:  Instability   Anterior Apprehension:    Posterior Apprehension:    Sulcus Test:   Thoracic Outlet   Adson's Test:    Odilia Test:        Radicular Symptoms:       Outcome Measures:        Treatments:    Manual Therapy: 0'  STM posterior cuff     There-ex: 42'  Pulleys thumb up in standing x5'  PROM Scaption, abduction, ER to tolerance following precautions  Prayer  AAROM scaption 2x6  Wand ER at 45 in supine 2x10  Wand abduction in standing 2x10  Gentle ER stretch at wall w/ 3-4\" holds x10  Wrist flexion stretch w/ 5\" holds x10    * = added to HEP.  " Sheet with exercise descriptions and images issued.  Skilled intervention utilized in the appropriate selection & application of above exercises.  Verbal and tactile cues provided for proper form and technique.  Pt. demonstrated appropriate form & verbalized understanding of optimal technique for above exercises.'    [email or web address]  Access Code: [email or web address]    EDUCATION: Home exercise program, plan of care, activity modifications, pain management, and injury pathology       Assessment:   Patient tolerated today's session w/ expected muscle soreness and fatigue. Demonstrates improvements in shoulder AAROM quality as well as activity tolerance. Continues to have difficulty w/ hand stiffness, which is improving. Patient is progressing slowly s/p RTC repair and will benefit from ongoing PT services to continue to progress shoulder ROM and initiate strengthening when appropriate per protocol to reach established goals to return to PLOF.         Plan:  Continue PROM progression as tolerated and manual therapy prn for symptom control.     Goals: Set and discussed today  Active       R RTC repair       STGs       Start:  04/01/25    Expected End:  05/06/25       1. Demonstrate independence with home exercise program  2. Tolerate increased exercise without adverse reaction  3. Demonstrate improved posture & proper body mechanics throughout session  4. Increase shoulder scaption, FF and abduction ROM to at least 125 each to progress towards LTGs ad improve ADLs.  5. Increase gross shoulder strength to at least 3+ to progress towards LTGs.  6. Report decrease in pain by > 2 points to meet the MCID         LTGs       Start:  04/01/25    Expected End:  06/10/25       1. Increase FF, scaption and abduction ROM to pain free + at least 160 each  2. Increase gross strength to pain free + at least 4+ to improve ADLs and allow exercise progression  3. Decrease score of Quick DASH by > 8 points to meet the  MCID  4. Perform ADLs without an increase symptoms  5. Increase gross periscapular strength to pain free + at least 3+ to decrease RTC strain and improve ADLs.  6. Increase shoulder ER to pain free + at least 60 degs to improve ADLs.                  Screening  Frequency  Date Last Completed   Spiritual and Cultural Beliefs   Screening each visit or episode of care 2/17/2025   Falls Risk Screening every ambulatory visit    Pain Screening annually at primary care visit  1/6/2025   Domestic Violence screening annually at primary care visit 1/6/2025   Depression Screening annually in the primary care setting 1/6/2025   Suicide Risk Screening annually in the primary care setting 2/17/2025   Nutrition and Food Insecurity   Screening at least annually at primary care visit     Key Learner annually in the primary care setting 2/17/2025   Drug Screen  2/17/2025  8:32 AM   Alcohol Screen  2/17/2025  8:32 AM   Advance Directive  1/6/2025       Plan of care was developed with input and agreement by the patient      Didier Lo, PT, ATC

## 2025-04-21 ENCOUNTER — TREATMENT (OUTPATIENT)
Dept: PHYSICAL THERAPY | Facility: CLINIC | Age: 60
End: 2025-04-21
Payer: COMMERCIAL

## 2025-04-21 DIAGNOSIS — M25.519 SHOULDER PAIN, UNSPECIFIED CHRONICITY, UNSPECIFIED LATERALITY: ICD-10-CM

## 2025-04-21 DIAGNOSIS — Z48.89 AFTERCARE FOLLOWING SURGERY: ICD-10-CM

## 2025-04-21 DIAGNOSIS — S46.011D STRAIN OF RIGHT ROTATOR CUFF CAPSULE, SUBSEQUENT ENCOUNTER: Primary | ICD-10-CM

## 2025-04-21 PROCEDURE — 97110 THERAPEUTIC EXERCISES: CPT | Mod: GP

## 2025-04-21 ASSESSMENT — PAIN - FUNCTIONAL ASSESSMENT: PAIN_FUNCTIONAL_ASSESSMENT: 0-10

## 2025-04-21 ASSESSMENT — PAIN SCALES - GENERAL: PAINLEVEL_OUTOF10: 0 - NO PAIN

## 2025-04-21 NOTE — PROGRESS NOTES
Physical Therapy  Physical Therapy Treatment    Patient Name: Mikey Salinas  MRN: [id number]  Today's Date: 4/21/2025  Time Calculation  Start Time: 0931  Stop Time: 1013  Time Calculation (min): 42 min      PT Therapeutic Procedures Time Entry  Therapeutic Exercise Time Entry: 42          Insurance:  Visit number: 7 of 20  Authorization info: no auth  Insurance Type: Payor: MEDICAL [facility] / Plan: MEDICAL [facility] HMO / Product Type: *No Product type* /      Current Problem  1. Strain of right rotator cuff capsule, subsequent encounter  Follow Up In Physical Therapy      2. Shoulder pain, unspecified chronicity, unspecified laterality  Follow Up In Physical Therapy      3. Aftercare following surgery  Follow Up In Physical Therapy          General:  General  Reason for Referral: R large RTC repair  Referred By: Rosanna Allen PA-C    Precautions:  Precautions  Precautions Comment: Scilosis, large RTC repair  Medical History Form: Reviewed (scanned into chart)    Subjective:   Patient reports he is feeling better, feels his ROM is improving and he is making progress. Hand pain is improving, still achy. Tolerating HEP progression of AAROM.     Pain:  Pain Assessment: 0-10  0-10 (Numeric) Pain Score: 0 - No pain (mild pain w/ activities, improving)    ARTHROSCOPY, SHOULDER, WITH ROTATOR CUFF REPAIR  17085 - [place] SURGICAL ARTHROSCOPY SHOULDER W/ROTATOR CUFF RPR     Debridement Shoulder(Beachchair vascular table, Tennet table, Arthrex implant, Regeneten)  25191 - [place] SURGICAL ARTHROSCOPY SHOULDER XTNSV DBRDMT 3+   Findings: Large 3 cm rotator cuff tear with severe tendinopathy, biceps rupture, degenerative labrum tearing, partial upper border subscapularis tendon tear         Objective:   ROM  *denotes end range pain/discomfort    Cervical AROM (Degrees)    Flexion: 60  Extension: 50  (L) Side Bend: 40  (R) Side Bend: 40  (L) Rotation: 80  (R) Rotation: 80      Shoulder AROM (Degrees)  Deferred due to  "post-op status    (R)  (L)  Flexion:      Abduction:        Functional ER:        Functional IR:            Shoulder PROM (Degrees) updated 4/21      (R)  (L)  Flexion: 100*  wnl      Abduction: 95*  wnl     ER at 0:  20*  wnl     IR at 0:  belly  wnl         ER at 45: 20  wnl      IR at 90: NT  wnl          Elbow AROM (Degrees)      (R)  (L)  Flexion: 135  135      Extension: -1  0         Elbow PROM (Degrees)      (R)  (L)  Flexion: 135  135      Extension: 0  0             Strength Testing  Deferred due to post-op status  Shoulder    (R)  (L)  Flexion:         Abduction:        ER:         IR:             Elbow    (R)  (L)  Flexion:          Extension:            Periscapular Musculature    (R)  (L)  Upper Traps:        Middle Traps:        Lower Traps:        Rhomboids:            Posture: moderate rounded shoulders      Palpation: SILT, mild tenderness around portals. Normal for post-op status.       Joint Mobility: NT      Observation: Portals are well approximated and healing w/o signs or symptoms of infection.           Special Tests  Deferred due to post-op status  Cervical    Cervical flexion rotation test:   ULTT  Median:   Radial:   Ulnar:   DNF endurance:   Alar ligament:   Sharp Wally:       RTC/Impingement   Empty Can:    Drop Arm:    Painful Arc:    Lift Off Test:   AC Joint   Cross Arm Test:   Biceps   Speeds Test:   SLAP   O'Kadeemians Test:    Bicep load II:  Instability   Anterior Apprehension:    Posterior Apprehension:    Sulcus Test:   Thoracic Outlet   Adson's Test:    Odilia Test:        Radicular Symptoms:       Outcome Measures:        Treatments:    Manual Therapy: 0'      There-ex: 42'  Pulleys standing x5'  PROM Scaption, abduction, ER to tolerance following precautions  Wall scaption ball rolls at 45 deg angle 2x15-20  Abduction ball rolls in standing 2x15-20  Doorway gentle ER stretch 6x10\"*    * = added to HEP.  Sheet with exercise descriptions and images issued.  Skilled intervention " utilized in the appropriate selection & application of above exercises.  Verbal and tactile cues provided for proper form and technique.  Pt. demonstrated appropriate form & verbalized understanding of optimal technique for above exercises.'    [email or web address]  Access Code: [email or web address]    EDUCATION: Home exercise program, plan of care, activity modifications, pain management, and injury pathology       Assessment:   Patient tolerated today's session w/ expected muscle soreness and fatigue. Demonstrates improvements in activity tolerance and shoulder AAROM. Continues to have deficits in shoulder ROM as well as UE strength. Patient will benefit from ongoing PT services to continue to progress ROM and initiate UE strengthening when appropriate per post-op protocol to reach established goals.       Plan:  Continue P/AAROM progression as tolerated and manual therapy prn for symptom control.     Goals: Set and discussed today  Active       R RTC repair       STGs       Start:  04/01/25    Expected End:  05/06/25       1. Demonstrate independence with home exercise program  2. Tolerate increased exercise without adverse reaction  3. Demonstrate improved posture & proper body mechanics throughout session  4. Increase shoulder scaption, FF and abduction ROM to at least 125 each to progress towards LTGs ad improve ADLs.  5. Increase gross shoulder strength to at least 3+ to progress towards LTGs.  6. Report decrease in pain by > 2 points to meet the MCID         LTGs       Start:  04/01/25    Expected End:  06/10/25       1. Increase FF, scaption and abduction ROM to pain free + at least 160 each  2. Increase gross strength to pain free + at least 4+ to improve ADLs and allow exercise progression  3. Decrease score of Quick DASH by > 8 points to meet the MCID  4. Perform ADLs without an increase symptoms  5. Increase gross periscapular strength to pain free + at least 3+ to decrease RTC strain and  improve ADLs.  6. Increase shoulder ER to pain free + at least 60 degs to improve ADLs.                  Screening  Frequency  Date Last Completed   Spiritual and Cultural Beliefs   Screening each visit or episode of care 2/17/2025   Falls Risk Screening every ambulatory visit    Pain Screening annually at primary care visit  1/6/2025   Domestic Violence screening annually at primary care visit 1/6/2025   Depression Screening annually in the primary care setting 1/6/2025   Suicide Risk Screening annually in the primary care setting 2/17/2025   Nutrition and Food Insecurity   Screening at least annually at primary care visit     Key Learner annually in the primary care setting 2/17/2025   Drug Screen  2/17/2025  8:32 AM   Alcohol Screen  2/17/2025  8:32 AM   Advance Directive  1/6/2025       Plan of care was developed with input and agreement by the patient      Didier Lo, PT, ATC

## 2025-04-23 NOTE — PROGRESS NOTES
Physical Therapy  Physical Therapy Treatment    Patient Name: Mikey Salinas  MRN: [id number]  Today's Date: 4/24/2025  Time Calculation  Start Time: 0915  Stop Time: 1000  Time Calculation (min): 45 min      PT Therapeutic Procedures Time Entry  Therapeutic Exercise Time Entry: 40          Insurance:  Visit number: 8 of 20  Authorization info: no auth  Insurance Type: Payor: MEDICAL [employer] [facility] / Plan: MEDICAL [facility] HMO / Product Type: *No Product type* /      Current Problem  1. Shoulder pain, unspecified chronicity, unspecified laterality  Follow Up In Physical Therapy      2. Unspecified rotator cuff tear or rupture of right shoulder, not specified as traumatic        3. Aftercare following surgery  Follow Up In Physical Therapy      4. Strain of right rotator cuff capsule, subsequent encounter  Follow Up In Physical Therapy          General:       Precautions:     Medical History Form: Reviewed (scanned into chart)    Subjective:   Patient reports he is feeling that he is moving better still not actively using arm.    Pain:       ARTHROSCOPY, SHOULDER, WITH ROTATOR CUFF REPAIR  95798 - [place] ARTHROS[place] SHOULDER W/ROTATOR CUFF RPR     Debridement Shoulder(Beachchair vascular table, Tennet table, Arthrex implant, Regeneten)  [id number] - [place] ARTHROSCOPY SHOULDER XTNSV DBRDMT 3+   Findings: Large 3 cm rotator cuff tear with severe tendinopathy, biceps rupture, degenerative labrum tearing, partial upper border subscapularis tendon tear         Objective:   ROM  *denotes end range pain/discomfort    Cervical AROM (Degrees)    Flexion: 60  Extension: 50  (L) Side Bend: 40  (R) Side Bend: 40  (L) Rotation: 80  (R) Rotation: 80      Shoulder AROM (Degrees)  Deferred due to post-op status    (R)  (L)  Flexion:      Abduction:        Functional ER:        Functional IR:            Shoulder PROM (Degrees) updated 4/21      (R)  (L)  Flexion: 100*  wnl      Abduction: 95*  wnl     ER at  0:  20*  wnl     IR at 0:  belly  wnl         ER at 45: 20  wnl      IR at 90: NT  wnl          Elbow AROM (Degrees)      (R)  (L)  Flexion: 135  135      Extension: -1  0         Elbow PROM (Degrees)      (R)  (L)  Flexion: 135  135      Extension: 0  0             Strength Testing  Deferred due to post-op status  Shoulder    (R)  (L)  Flexion:         Abduction:        ER:         IR:             Elbow    (R)  (L)  Flexion:          Extension:            Periscapular Musculature    (R)  (L)  Upper Traps:        Middle Traps:        Lower Traps:        Rhomboids:            Posture: moderate rounded shoulders      Palpation: SILT, mild tenderness around portals. Normal for post-op status.       Joint Mobility: NT      Observation: Portals are well approximated and healing w/o signs or symptoms of infection.           Special Tests  Deferred due to post-op status  Cervical    Cervical flexion rotation test:   ULTT  Median:   Radial:   Ulnar:   DNF endurance:   Alar ligament:   Sharp Wally:       RTC/Impingement   Empty Can:    Drop Arm:    Painful Arc:    Lift Off Test:   AC Joint   Cross Arm Test:   Biceps   Speeds Test:   SLAP   O'Brians Test:    Bicep load II:  Instability   Anterior Apprehension:    Posterior Apprehension:    Sulcus Test:   Thoracic Outlet   Adson's Test:    Odilia Test:        Radicular Symptoms:       Outcome Measures:        Treatments:    Manual Therapy: 0'      There-ex: 40'  Pulleys standing x5'  PROM Scaption, abduction, ER to tolerance following precautions  Inclined slides 45* x 10  flexion and scaption, circles 2 x 5 ea cw/ccw  Abduction ball rolls in standing 2x15-20  Standing and supine ER stretch with wand    * = added to HEP.  Sheet with exercise descriptions and images issued.  Skilled intervention utilized in the appropriate selection & application of above exercises.  Verbal and tactile cues provided for proper form and technique.  Pt. demonstrated appropriate form & verbalized  understanding of optimal technique for above exercises.'    [email or web address]  Access Code: [email or web address]    EDUCATION: Home exercise program, plan of care, activity modifications, pain management, and injury pathology       Assessment:   Patient tolerated today's session w/ expected muscle soreness and fatigue. Demonstrates improvements in activity tolerance and shoulder AAROM. Continues to have deficits in shoulder ROM as well as UE strength. Good understanding and compliance of HEP.  Patient will benefit from ongoing PT services to continue to progress ROM and initiate UE strengthening when appropriate per post-op protocol to reach established goals.       Plan:  Continue P/AAROM progression as tolerated and manual therapy prn for symptom control.     Goals: Set and discussed today  Active       R RTC repair       STGs       Start:  04/01/25    Expected End:  05/06/25       1. Demonstrate independence with home exercise program  2. Tolerate increased exercise without adverse reaction  3. Demonstrate improved posture & proper body mechanics throughout session  4. Increase shoulder scaption, FF and abduction ROM to at least 125 each to progress towards LTGs ad improve ADLs.  5. Increase gross shoulder strength to at least 3+ to progress towards LTGs.  6. Report decrease in pain by > 2 points to meet the MCID         LTGs       Start:  04/01/25    Expected End:  06/10/25       1. Increase FF, scaption and abduction ROM to pain free + at least 160 each  2. Increase gross strength to pain free + at least 4+ to improve ADLs and allow exercise progression  3. Decrease score of Quick DASH by > 8 points to meet the MCID  4. Perform ADLs without an increase symptoms  5. Increase gross periscapular strength to pain free + at least 3+ to decrease RTC strain and improve ADLs.  6. Increase shoulder ER to pain free + at least 60 degs to improve ADLs.                  Screening  Frequency  Date Last  Completed   Spiritual and Cultural Beliefs   Screening each visit or episode of care 2/17/2025   Falls Risk Screening every ambulatory visit    Pain Screening annually at primary care visit  1/6/2025   Domestic Violence screening annually at primary care visit 1/6/2025   Depression Screening annually in the primary care setting 1/6/2025   Suicide Risk Screening annually in the primary care setting 2/17/2025   Nutrition and Food Insecurity   Screening at least annually at primary care visit     Key Learner annually in the primary care setting 2/17/2025   Drug Screen  2/17/2025  8:32 AM   Alcohol Screen  2/17/2025  8:32 AM   Advance Directive  1/6/2025       Plan of care was developed with input and agreement by the patient      Liudmila Cortes PT

## 2025-04-24 ENCOUNTER — TREATMENT (OUTPATIENT)
Dept: PHYSICAL THERAPY | Facility: CLINIC | Age: 60
End: 2025-04-24
Payer: COMMERCIAL

## 2025-04-24 DIAGNOSIS — M25.519 SHOULDER PAIN, UNSPECIFIED CHRONICITY, UNSPECIFIED LATERALITY: Primary | ICD-10-CM

## 2025-04-24 DIAGNOSIS — S46.011D STRAIN OF RIGHT ROTATOR CUFF CAPSULE, SUBSEQUENT ENCOUNTER: ICD-10-CM

## 2025-04-24 DIAGNOSIS — M75.101 UNSPECIFIED ROTATOR CUFF TEAR OR RUPTURE OF RIGHT SHOULDER, NOT SPECIFIED AS TRAUMATIC: ICD-10-CM

## 2025-04-24 DIAGNOSIS — Z48.89 AFTERCARE FOLLOWING SURGERY: ICD-10-CM

## 2025-04-24 PROCEDURE — 97110 THERAPEUTIC EXERCISES: CPT | Mod: GP,CQ

## 2025-04-25 ENCOUNTER — APPOINTMENT (OUTPATIENT)
Dept: ORTHOPEDIC SURGERY | Facility: CLINIC | Age: 60
End: 2025-04-25
Payer: COMMERCIAL

## 2025-04-25 DIAGNOSIS — B37.9 CANDIDA INFECTION: Primary | ICD-10-CM

## 2025-04-25 DIAGNOSIS — Z48.89 AFTERCARE FOLLOWING SURGERY: ICD-10-CM

## 2025-04-25 PROCEDURE — 99024 POSTOP FOLLOW-UP VISIT: CPT

## 2025-04-25 RX ORDER — KETOCONAZOLE 20 MG/G
CREAM TOPICAL 2 TIMES DAILY
Qty: 15 G | Refills: 0 | Status: SHIPPED | OUTPATIENT
Start: 2025-04-25

## 2025-04-25 NOTE — PROGRESS NOTES
Returns now about 10 weeks out from his rotator cuff repair.  His symptoms of his hand stiffness and swelling has somewhat improved. Mild pain. He has been wearing a compression glove at night which helps. No real associated numbness/tingling. Some pain in the elbow. He is making progress with his shoulder. Continues PT. Notes a red rash under his armpit that is itchy at times. He has had in the past and been treated with anti-fungal with resolution.     Review of Systems  A complete review of systems was conducted, pertinent only to the HPI noted above.  Constitutional: None  Eyes: No additions to above history  Ears, Nose, Throat: No additions to above history  Cardiovascular: No additions to above history  Respiratory: No additions to above history  GI: No additions to above history  : No additions to above history  Skin/Neuro: No additions to above history  Endocrine/Heme/Lymph: No additions to above history  Immunologic: No additions to above history  Psychiatric: No additions to above history  Musculoskeletal: see above    Physical Exam  GEN: Alert and Oriented x 3  Constitutional: Well appearing, in no apparent distress.        Focused Musculoskeletal Exam:     RIGHT  shoulder:  portal incisions closed, no signs of infection,  biceps incision closed, no signs of infection, biceps fires, deltoid fires. PROM  ER 40. right hand was examined there is some swelling along the base of the thumb as well as some stiffness of his IP joint and swelling of his thumb.  Swelling does not progress proximally there is no pitting edema. Red scaly rash in axilla with no involvement of incisions.     Sensation intact Ax/median/ulnar/radial distributions  Motor intact Ax/median/radial/ulnar/AIN/PIN    Doing well from his shoulder. Continue PT and HEP. Will send him an antifungal cream. Avoid on inscisions. If no improvement after a few day, contact PCP. He has made improvement with his hand. Will call for worsening  symptoms.  No concern for blood clots, patient is also on Xarelto.  Follow up 3 months. All questions answered, patient in agreement with the plan.

## 2025-04-28 ENCOUNTER — TREATMENT (OUTPATIENT)
Dept: PHYSICAL THERAPY | Facility: CLINIC | Age: 60
End: 2025-04-28
Payer: COMMERCIAL

## 2025-04-28 DIAGNOSIS — M25.519 SHOULDER PAIN, UNSPECIFIED CHRONICITY, UNSPECIFIED LATERALITY: Primary | ICD-10-CM

## 2025-04-28 DIAGNOSIS — S46.011D STRAIN OF RIGHT ROTATOR CUFF CAPSULE, SUBSEQUENT ENCOUNTER: ICD-10-CM

## 2025-04-28 DIAGNOSIS — Z48.89 AFTERCARE FOLLOWING SURGERY: ICD-10-CM

## 2025-04-28 PROCEDURE — 97110 THERAPEUTIC EXERCISES: CPT | Mod: GP

## 2025-04-28 ASSESSMENT — PAIN - FUNCTIONAL ASSESSMENT: PAIN_FUNCTIONAL_ASSESSMENT: 0-10

## 2025-04-28 ASSESSMENT — PAIN SCALES - GENERAL: PAINLEVEL_OUTOF10: 0 - NO PAIN

## 2025-04-28 NOTE — PROGRESS NOTES
Physical Therapy  Physical Therapy Treatment    Patient Name: Mikey Salinas  MRN: [id number]  Today's Date: 4/28/2025  Time Calculation  Start Time: 0930  Stop Time: 1010  Time Calculation (min): 40 min      PT Therapeutic Procedures Time Entry  Therapeutic Exercise Time Entry: 40          Insurance:  Visit number: 9 of 20  Authorization info: no auth  Insurance Type: Payor: MEDICAL [employer] [facility] / Plan: [facility] HMO / Product Type: *No Product type* /      Current Problem  1. Shoulder pain, unspecified chronicity, unspecified laterality  Follow Up In Physical Therapy      2. Aftercare following surgery  Follow Up In Physical Therapy      3. Strain of right rotator cuff capsule, subsequent encounter  Follow Up In Physical Therapy          General:  General  Reason for Referral: R large RTC repair  Referred By: Rosanna Allen PA-C    Precautions:  Precautions  Precautions Comment: Scilosis, large RTC repair  Medical History Form: Reviewed (scanned into chart)    Subjective:   Patient reports he is feeling pretty good, some stiffness and soreness but feels he is improving.     Pain:  Pain Assessment: 0-10  0-10 (Numeric) Pain Score: 0 - No pain (soreness and stiffness)    ARTHROSCOPY, SHOULDER, WITH ROTATOR CUFF REPAIR  52629 - [place] ARTH[place] SHOULDER W/ROTATOR CUFF RPR     Debridement Shoulder(Beachchair vascular table, Tennet table, Arthrex implant, Regeneten)  [id number] - [place] SUR[place] ARTHROSCOPY SHOULDER XTNSV DBRDMT 3+   Findings: Large 3 cm rotator cuff tear with severe tendinopathy, biceps rupture, degenerative labrum tearing, partial upper border subscapularis tendon tear         Objective:   ROM  *denotes end range pain/discomfort    Cervical AROM (Degrees)    Flexion: 60  Extension: 50  (L) Side Bend: 40  (R) Side Bend: 40  (L) Rotation: 80  (R) Rotation: 80      Shoulder AROM (Degrees)  Deferred due to post-op status    (R)  (L)  Flexion:      Abduction:        Functional  "ER:        Functional IR:            Shoulder PROM (Degrees) updated 4/28       (R)  (L)  Flexion: 135*  wnl      Abduction: 100*  wnl     ER at 0:  25*  wnl     IR at 0:  belly  wnl         ER at 45: 25*  wnl      IR at 90: NT  wnl          Elbow AROM (Degrees)      (R)  (L)  Flexion: 135  135      Extension: -1  0         Elbow PROM (Degrees)      (R)  (L)  Flexion: 135  135      Extension: 0  0             Strength Testing  Deferred due to post-op status  Shoulder    (R)  (L)  Flexion:         Abduction:        ER:         IR:             Elbow    (R)  (L)  Flexion:          Extension:            Periscapular Musculature    (R)  (L)  Upper Traps:        Middle Traps:        Lower Traps:        Rhomboids:            Posture: moderate rounded shoulders      Palpation: SILT, mild tenderness around portals. Normal for post-op status.       Joint Mobility: NT      Observation: Portals are well approximated and healing w/o signs or symptoms of infection.           Special Tests  Deferred due to post-op status  Cervical    Cervical flexion rotation test:   ULTT  Median:   Radial:   Ulnar:   DNF endurance:   Alar ligament:   Sharp Wally:       RTC/Impingement   Empty Can:    Drop Arm:    Painful Arc:    Lift Off Test:   AC Joint   Cross Arm Test:   Biceps   Speeds Test:   SLAP   O'Nalini Test:    Bicep load II:  Instability   Anterior Apprehension:    Posterior Apprehension:    Sulcus Test:   Thoracic Outlet   Adson's Test:    Odilia Test:        Radicular Symptoms:       Outcome Measures:        Treatments:    Manual Therapy: 0'      There-ex: 40'  Pulleys standing x 2:30\", seated 2:30\"  PROM Scaption, abduction, ER to tolerance following precautions  Sub-max gentle isometrics: FF, ER, IR, abduction, adduction all w/ 3-4\" holds x8 each*  Inclined slides 45 2x20  Abduction ball rolls in standing x15-20    * = added to HEP.  Sheet with exercise descriptions and images issued.  Skilled intervention utilized in the " appropriate selection & application of above exercises.  Verbal and tactile cues provided for proper form and technique.  Pt. demonstrated appropriate form & verbalized understanding of optimal technique for above exercises.'    [email or web address]  Access Code: [email or web address]    EDUCATION: Home exercise program, plan of care, activity modifications, pain management, and injury pathology       Assessment:   Patient tolerated today's session w/ expected muscle fatigue and soreness. Demonstrates improvements in activity tolerance and shoulder ROM displayed through improved tolerance to passive ranging and initiation of gentle isometrics w/o adverse reaction. Patient will benefit from ongoing PT services to continue to progress shoulder ROM, scapular and cuff strengthening activities as tolerated per post-op protocol to reach established goals to return to PLOF.       Plan:  Continue P/AAROM progression as tolerated and manual therapy prn for symptom control. Assess response to introduction of isometrics.    Goals: Set and discussed today  Active       R RTC repair       STGs       Start:  04/01/25    Expected End:  05/06/25       1. Demonstrate independence with home exercise program  2. Tolerate increased exercise without adverse reaction  3. Demonstrate improved posture & proper body mechanics throughout session  4. Increase shoulder scaption, FF and abduction ROM to at least 125 each to progress towards LTGs ad improve ADLs.  5. Increase gross shoulder strength to at least 3+ to progress towards LTGs.  6. Report decrease in pain by > 2 points to meet the MCID         LTGs       Start:  04/01/25    Expected End:  06/10/25       1. Increase FF, scaption and abduction ROM to pain free + at least 160 each  2. Increase gross strength to pain free + at least 4+ to improve ADLs and allow exercise progression  3. Decrease score of Quick DASH by > 8 points to meet the MCID  4. Perform ADLs without an  increase symptoms  5. Increase gross periscapular strength to pain free + at least 3+ to decrease RTC strain and improve ADLs.  6. Increase shoulder ER to pain free + at least 60 degs to improve ADLs.                  Screening  Frequency  Date Last Completed   Spiritual and Cultural Beliefs   Screening each visit or episode of care 2/17/2025   Falls Risk Screening every ambulatory visit    Pain Screening annually at primary care visit  1/6/2025   Domestic Violence screening annually at primary care visit 1/6/2025   Depression Screening annually in the primary care setting 1/6/2025   Suicide Risk Screening annually in the primary care setting 2/17/2025   Nutrition and Food Insecurity   Screening at least annually at primary care visit     Key Learner annually in the primary care setting 2/17/2025   Drug Screen  2/17/2025  8:32 AM   Alcohol Screen  2/17/2025  8:32 AM   Advance Directive  1/6/2025       Plan of care was developed with input and agreement by the patient      Didier Lo, PT, ATC

## 2025-05-01 ENCOUNTER — TREATMENT (OUTPATIENT)
Dept: PHYSICAL THERAPY | Facility: CLINIC | Age: 60
End: 2025-05-01
Payer: COMMERCIAL

## 2025-05-01 DIAGNOSIS — S46.011D STRAIN OF RIGHT ROTATOR CUFF CAPSULE, SUBSEQUENT ENCOUNTER: ICD-10-CM

## 2025-05-01 DIAGNOSIS — M25.519 SHOULDER PAIN, UNSPECIFIED CHRONICITY, UNSPECIFIED LATERALITY: Primary | ICD-10-CM

## 2025-05-01 DIAGNOSIS — Z48.89 AFTERCARE FOLLOWING SURGERY: ICD-10-CM

## 2025-05-01 PROCEDURE — 97110 THERAPEUTIC EXERCISES: CPT | Mod: GP

## 2025-05-01 ASSESSMENT — PAIN - FUNCTIONAL ASSESSMENT: PAIN_FUNCTIONAL_ASSESSMENT: 0-10

## 2025-05-01 ASSESSMENT — PAIN SCALES - GENERAL: PAINLEVEL_OUTOF10: 0 - NO PAIN

## 2025-05-06 ENCOUNTER — TREATMENT (OUTPATIENT)
Dept: PHYSICAL THERAPY | Facility: CLINIC | Age: 60
End: 2025-05-06
Payer: COMMERCIAL

## 2025-05-06 DIAGNOSIS — M25.519 SHOULDER PAIN, UNSPECIFIED CHRONICITY, UNSPECIFIED LATERALITY: ICD-10-CM

## 2025-05-06 DIAGNOSIS — Z48.89 AFTERCARE FOLLOWING SURGERY: ICD-10-CM

## 2025-05-06 DIAGNOSIS — S46.011D STRAIN OF RIGHT ROTATOR CUFF CAPSULE, SUBSEQUENT ENCOUNTER: Primary | ICD-10-CM

## 2025-05-06 PROCEDURE — 97110 THERAPEUTIC EXERCISES: CPT | Mod: GP

## 2025-05-06 ASSESSMENT — PAIN SCALES - GENERAL: PAINLEVEL_OUTOF10: 0 - NO PAIN

## 2025-05-06 ASSESSMENT — PAIN - FUNCTIONAL ASSESSMENT: PAIN_FUNCTIONAL_ASSESSMENT: 0-10

## 2025-05-06 NOTE — PROGRESS NOTES
Physical Therapy  Physical Therapy Treatment    Patient Name: Mikey Salinas  MRN: [id number]  Today's Date: 5/6/2025  Time Calculation  Start Time: 0830  Stop Time: 0915  Time Calculation (min): 45 min      PT Therapeutic Procedures Time Entry  Therapeutic Exercise Time Entry: 45          Insurance:  Visit number: 11 of 20  Authorization info: no auth  Insurance Type: Payor: MEDICAL [facility] / Plan: [facility] HMO / Product Type: *No Product type* /      Current Problem  1. Strain of right rotator cuff capsule, subsequent encounter  Follow Up In Physical Therapy      2. Shoulder pain, unspecified chronicity, unspecified laterality  Follow Up In Physical Therapy      3. Aftercare following surgery  Follow Up In Physical Therapy            General:  General  Reason for Referral: R large RTC repair  Referred By: Rosanna Allen PA-C    Precautions:  Precautions  Precautions Comment: Scilosis, large RTC repair  Medical History Form: Reviewed (scanned into chart)    Subjective:   Patient reports he is doing well overall. Some soreness and stiffness but no joint pain. Hand pain feels worse the past few days as he has been trying to stretch it more.     Pain:  Pain Assessment: 0-10  0-10 (Numeric) Pain Score: 0 - No pain (just sore and stiff)    ARTHROSCOPY, SHOULDER, WITH ROTATOR CUFF REPAIR  39122 - [place] SURGICAL ARTHROSCOPY SHOULDER W/ROTATOR CUFF RPR     Debridement Shoulder(Beachchair vascular table, Tennet table, Arthrex implant, Regeneten)  [id number] - [place] SURGICAL ARTHROSCOPY SHOULDER XTNSV DBRDMT 3+   Findings: Large 3 cm rotator cuff tear with severe tendinopathy, biceps rupture, degenerative labrum tearing, partial upper border subscapularis tendon tear         Objective:   ROM  *denotes end range pain/discomfort    Cervical AROM (Degrees)    Flexion: 60  Extension: 50  (L) Side Bend: 40  (R) Side Bend: 40  (L) Rotation: 80  (R) Rotation: 80      Shoulder AROM (Degrees)  Deferred due to post-op  status    (R)  (L)  Flexion:      Abduction:        Functional ER:        Functional IR:            Shoulder PROM (Degrees) updated 5/6      (R)  (L)  Flexion: 150*  wnl      Abduction: 115*  wnl     ER at 0:  40*  wnl     IR at 0:  belly  wnl         ER at 45: 40*  wnl      IR at 90: NT  wnl          Elbow AROM (Degrees)      (R)  (L)  Flexion: 135  135      Extension: -1  0         Elbow PROM (Degrees)      (R)  (L)  Flexion: 135  135      Extension: 0  0             Strength Testing  Deferred due to post-op status  Shoulder    (R)  (L)  Flexion:         Abduction:        ER:         IR:             Elbow    (R)  (L)  Flexion:          Extension:            Periscapular Musculature    (R)  (L)  Upper Traps:        Middle Traps:        Lower Traps:        Rhomboids:            Posture: moderate rounded shoulders      Palpation: SILT, mild tenderness around portals. Normal for post-op status.       Joint Mobility: NT      Observation: Portals are well approximated and healing w/o signs or symptoms of infection.           Special Tests  Deferred due to post-op status  Cervical    Cervical flexion rotation test:   ULTT  Median:   Radial:   Ulnar:   DNF endurance:   Alar ligament:   Sharp Wally:       RTC/Impingement   Empty Can:    Drop Arm:    Painful Arc:    Lift Off Test:   AC Joint   Cross Arm Test:   Biceps   Speeds Test:   SLAP   O'Nalini Test:    Bicep load II:  Instability   Anterior Apprehension:    Posterior Apprehension:    Sulcus Test:   Thoracic Outlet   Adson's Test:    Odilia Test:        Radicular Symptoms:       Outcome Measures:        Treatments:    Manual Therapy: 0'      There-ex: 45'  Seated pulleys x5'  PROM Scaption, abduction, ER and 20 and 45 degs to tolerance following precautions  Flexion reactive isometrics w/ yellow TB 2x6-8*  Extension reactive isometrics w/ yellow TB 2x6-8*  ER reactive isometrics w/ yellow TB 2x6-8*  IR reactive isometrics w/ yellow TB 2x6-8*  Seated wrist flexor tendon  gliding x15-20*    * = added to HEP.  Sheet with exercise descriptions and images issued.  Skilled intervention utilized in the appropriate selection & application of above exercises.  Verbal and tactile cues provided for proper form and technique.  Pt. demonstrated appropriate form & verbalized understanding of optimal technique for above exercises.        [email or web address]  Access Code: [email or web address]    EDUCATION: Home exercise program, plan of care, activity modifications, pain management, and injury pathology       Assessment:   Patient tolerated today's session w/ expected muscle soreness and fatigue. Demonstrates improvements in activity tolerance displayed through progression of isometrics to reactionary w/o adverse reaction. Continues to have difficulty w/ hand pain, but he responded well to introduction of tendon gliding, reported decreased symptoms after performance. Patient will benefit from ongoing PT services to continue to progress shoulder ROM, cuff and scapular strengthening as tolerated per post-op RTC repair protocol to reach established goals to return to PLOF.           Plan:  Continue P/AAROM progression as tolerated and manual therapy prn for symptom control. Progress isometric and wall slide volume as tolerated. Assess response to tendon gliding and continue to incorporate. Possible OT referral for hand therapy.     Goals: Set and discussed today  Active       R RTC repair       STGs       Start:  04/01/25    Expected End:  05/06/25       1. Demonstrate independence with home exercise program  2. Tolerate increased exercise without adverse reaction  3. Demonstrate improved posture & proper body mechanics throughout session  4. Increase shoulder scaption, FF and abduction ROM to at least 125 each to progress towards LTGs ad improve ADLs.  5. Increase gross shoulder strength to at least 3+ to progress towards LTGs.  6. Report decrease in pain by > 2 points to meet the MCID          LTGs       Start:  04/01/25    Expected End:  06/10/25       1. Increase FF, scaption and abduction ROM to pain free + at least 160 each  2. Increase gross strength to pain free + at least 4+ to improve ADLs and allow exercise progression  3. Decrease score of Quick DASH by > 8 points to meet the MCID  4. Perform ADLs without an increase symptoms  5. Increase gross periscapular strength to pain free + at least 3+ to decrease RTC strain and improve ADLs.  6. Increase shoulder ER to pain free + at least 60 degs to improve ADLs.                  Screening  Frequency  Date Last Completed   Spiritual and Cultural Beliefs   Screening each visit or episode of care 2/17/2025   Falls Risk Screening every ambulatory visit    Pain Screening annually at primary care visit  1/6/2025   Domestic Violence screening annually at primary care visit 1/6/2025   Depression Screening annually in the primary care setting 1/6/2025   Suicide Risk Screening annually in the primary care setting 2/17/2025   Nutrition and Food Insecurity   Screening at least annually at primary care visit     Key Learner annually in the primary care setting 2/17/2025   Drug Screen  2/17/2025  8:32 AM   Alcohol Screen  2/17/2025  8:32 AM   Advance Directive  1/6/2025       Plan of care was developed with input and agreement by the patient      Didier Lo, PT, ATC

## 2025-05-08 ENCOUNTER — TREATMENT (OUTPATIENT)
Dept: PHYSICAL THERAPY | Facility: CLINIC | Age: 60
End: 2025-05-08
Payer: COMMERCIAL

## 2025-05-08 DIAGNOSIS — M25.519 SHOULDER PAIN, UNSPECIFIED CHRONICITY, UNSPECIFIED LATERALITY: ICD-10-CM

## 2025-05-08 DIAGNOSIS — M79.641 RIGHT HAND PAIN: Primary | ICD-10-CM

## 2025-05-08 DIAGNOSIS — S46.011D STRAIN OF RIGHT ROTATOR CUFF CAPSULE, SUBSEQUENT ENCOUNTER: Primary | ICD-10-CM

## 2025-05-08 DIAGNOSIS — Z48.89 AFTERCARE FOLLOWING SURGERY: ICD-10-CM

## 2025-05-08 PROCEDURE — 97110 THERAPEUTIC EXERCISES: CPT | Mod: GP

## 2025-05-08 ASSESSMENT — PAIN - FUNCTIONAL ASSESSMENT: PAIN_FUNCTIONAL_ASSESSMENT: 0-10

## 2025-05-08 ASSESSMENT — PAIN SCALES - GENERAL: PAINLEVEL_OUTOF10: 0 - NO PAIN

## 2025-05-08 NOTE — PROGRESS NOTES
Physical Therapy  Physical Therapy Treatment    Patient Name: Mikey Salinas  MRN: [id number]  Today's Date: 5/8/2025  Time Calculation  Start Time: 1355  Stop Time: 1440  Time Calculation (min): 45 min      PT Therapeutic Procedures Time Entry  Therapeutic Exercise Time Entry: 45          Insurance:  Visit number: 12 of 20  Authorization info: no auth  Insurance Type: Payor: MEDICAL [facility] / Plan: [facility] HMO / Product Type: *No Product type* /      Current Problem  1. Strain of right rotator cuff capsule, subsequent encounter  Follow Up In Physical Therapy      2. Shoulder pain, unspecified chronicity, unspecified laterality  Follow Up In Physical Therapy      3. Aftercare following surgery  Follow Up In Physical Therapy              General:  General  Reason for Referral: R large RTC repair  Referred By: Rosanna Allen PA-C    Precautions:  Precautions  Precautions Comment: Scilosis, large RTC repair  Medical History Form: Reviewed (scanned into chart)    Subjective:  Patient reports he is feeling better, less sore and achy overall. No aggravation of shoulder w/ isometrics. Seeing hand specialist Monday for lingering swelling.     Pain:  Pain Assessment: 0-10  0-10 (Numeric) Pain Score: 0 - No pain    ARTHROSCOPY, SHOULDER, WITH ROTATOR CUFF REPAIR  54924 - [place] SURGICAL ARTHROSCOPY SHOULDER W/ROTATOR CUFF RPR     Debridement Shoulder(Beachchair vascular table, Tennet table, Arthrex implant, Regeneten)  28222 - [place] SURGICAL ARTHROSCOPY SHOULDER XTNSV DBRDMT 3+   Findings: Large 3 cm rotator cuff tear with severe tendinopathy, biceps rupture, degenerative labrum tearing, partial upper border subscapularis tendon tear         Objective:   ROM  *denotes end range pain/discomfort    Cervical AROM (Degrees)    Flexion: 60  Extension: 50  (L) Side Bend: 40  (R) Side Bend: 40  (L) Rotation: 80  (R) Rotation: 80      Shoulder AROM (Degrees)  Deferred due to post-op  status    (R)  (L)  Flexion:      Abduction:        Functional ER:        Functional IR:            Shoulder PROM (Degrees) updated 5/6      (R)  (L)  Flexion: 150*  wnl      Abduction: 115*  wnl     ER at 0:  40*  wnl     IR at 0:  belly  wnl         ER at 45: 40*  wnl      IR at 90: NT  wnl          Elbow AROM (Degrees)      (R)  (L)  Flexion: 135  135      Extension: -1  0         Elbow PROM (Degrees)      (R)  (L)  Flexion: 135  135      Extension: 0  0             Strength Testing  Deferred due to post-op status  Shoulder    (R)  (L)  Flexion:         Abduction:        ER:         IR:             Elbow    (R)  (L)  Flexion:          Extension:            Periscapular Musculature    (R)  (L)  Upper Traps:        Middle Traps:        Lower Traps:        Rhomboids:            Posture: moderate rounded shoulders      Palpation: SILT, mild tenderness around portals. Normal for post-op status.       Joint Mobility: NT      Observation: Portals are well approximated and healing w/o signs or symptoms of infection.           Special Tests  Deferred due to post-op status  Cervical    Cervical flexion rotation test:   ULTT  Median:   Radial:   Ulnar:   DNF endurance:   Alar ligament:   Sharp Wally:       RTC/Impingement   Empty Can:    Drop Arm:    Painful Arc:    Lift Off Test:   AC Joint   Cross Arm Test:   Biceps   Speeds Test:   SLAP   O'Kadeemians Test:    Bicep load II:  Instability   Anterior Apprehension:    Posterior Apprehension:    Sulcus Test:   Thoracic Outlet   Adson's Test:    Odilia Test:        Radicular Symptoms:       Outcome Measures:        Treatments:    Manual Therapy: 0'      There-ex: 45'  Seated pulleys x5'  PROM Scaption, abduction, ER and 20 and 45 degs to tolerance following precautions  S/l FF AROM 2x10*  S/l abduction AROM 2x10*  S/l ER w/ bolster AROM 2x10*  Rope cable tricep extension w/ 5# 2x12-15  Cable straight bar bicep curl w/ 5# 2x12-15    * = added to HEP.  Sheet with exercise  descriptions and images issued.  Skilled intervention utilized in the appropriate selection & application of above exercises.  Verbal and tactile cues provided for proper form and technique.  Pt. demonstrated appropriate form & verbalized understanding of optimal technique for above exercises.        [email or web address]  Access Code: [email or web address]    EDUCATION: Home exercise program, plan of care, activity modifications, pain management, and injury pathology       Assessment:   Patient tolerated today's session w/ expected muscle fatigue and soreness. Demonstrates improvements in activity tolerance and shoulder strength displayed through progression of there-ex to include elbow RROM and gravity eliminated shoulder AROM w/o adverse reaction. Did well w/ all new exercises introduced in today's session. Patient is progressing slowly s/p RTC repair and will benefit from ongoing PT services to continue to progress shoulder ROM, cuff and scapular strength as tolerated per protocol to reach established goals to return to PLOF.       Plan:  Continue P/AAROM progression as tolerated and manual therapy prn for symptom control. Progress isometric and wall slide volume as tolerated. Assess response to tendon gliding and continue to incorporate. Possible OT referral for hand therapy.     Goals: Set and discussed today  Active       R RTC repair       STGs       Start:  04/01/25    Expected End:  05/06/25       1. Demonstrate independence with home exercise program  2. Tolerate increased exercise without adverse reaction  3. Demonstrate improved posture & proper body mechanics throughout session  4. Increase shoulder scaption, FF and abduction ROM to at least 125 each to progress towards LTGs ad improve ADLs.  5. Increase gross shoulder strength to at least 3+ to progress towards LTGs.  6. Report decrease in pain by > 2 points to meet the MCID         LTGs       Start:  04/01/25    Expected End:  06/10/25        1. Increase FF, scaption and abduction ROM to pain free + at least 160 each  2. Increase gross strength to pain free + at least 4+ to improve ADLs and allow exercise progression  3. Decrease score of Quick DASH by > 8 points to meet the MCID  4. Perform ADLs without an increase symptoms  5. Increase gross periscapular strength to pain free + at least 3+ to decrease RTC strain and improve ADLs.  6. Increase shoulder ER to pain free + at least 60 degs to improve ADLs.                  Screening  Frequency  Date Last Completed   Spiritual and Cultural Beliefs   Screening each visit or episode of care 2/17/2025   Falls Risk Screening every ambulatory visit    Pain Screening annually at primary care visit  1/6/2025   Domestic Violence screening annually at primary care visit 1/6/2025   Depression Screening annually in the primary care setting 1/6/2025   Suicide Risk Screening annually in the primary care setting 2/17/2025   Nutrition and Food Insecurity   Screening at least annually at primary care visit     Key Learner annually in the primary care setting 2/17/2025   Drug Screen  2/17/2025  8:32 AM   Alcohol Screen  2/17/2025  8:32 AM   Advance Directive  1/6/2025       Plan of care was developed with input and agreement by the patient      Didier Lo, PT, ATC

## 2025-05-09 ENCOUNTER — OFFICE VISIT (OUTPATIENT)
Dept: ORTHOPEDIC SURGERY | Facility: CLINIC | Age: 60
End: 2025-05-09
Payer: COMMERCIAL

## 2025-05-09 ENCOUNTER — HOSPITAL ENCOUNTER (OUTPATIENT)
Dept: RADIOLOGY | Facility: CLINIC | Age: 60
Discharge: HOME | End: 2025-05-09
Payer: COMMERCIAL

## 2025-05-09 VITALS — HEIGHT: 70 IN | BODY MASS INDEX: 32.07 KG/M2 | WEIGHT: 224 LBS

## 2025-05-09 DIAGNOSIS — R60.0 HAND EDEMA: Primary | ICD-10-CM

## 2025-05-09 DIAGNOSIS — M79.641 RIGHT HAND PAIN: ICD-10-CM

## 2025-05-09 PROCEDURE — 1036F TOBACCO NON-USER: CPT

## 2025-05-09 PROCEDURE — 99214 OFFICE O/P EST MOD 30 MIN: CPT

## 2025-05-09 PROCEDURE — 3008F BODY MASS INDEX DOCD: CPT

## 2025-05-09 PROCEDURE — 73130 X-RAY EXAM OF HAND: CPT | Mod: RT

## 2025-05-09 PROCEDURE — 99204 OFFICE O/P NEW MOD 45 MIN: CPT

## 2025-05-09 ASSESSMENT — PAIN - FUNCTIONAL ASSESSMENT: PAIN_FUNCTIONAL_ASSESSMENT: 0-10

## 2025-05-09 ASSESSMENT — PAIN SCALES - GENERAL: PAINLEVEL_OUTOF10: 7

## 2025-05-09 NOTE — PROGRESS NOTES
HPI  Mikey Salinas is a 59 y.o. male  in office today for   Chief Complaint   Patient presents with    Right Hand - Edema, Pain     5 weeks he has had pain in his right hand since he had RTC surgery he has limited ROM and numbness in his small finger    .  he had RTC surgery about 3 months ago.  Was in a sling for 6 weeks after surgery and was not moving hand.  Pain in intermittent, worse with using a knife, cutting something.  Pain will get worse as the day goes along.  Fingers will also swell as the day progresses.  Pain more in the middle-small finger. No pain in the thumb and index.  Was having pain in the wrist with decreased motion but that has improved.  He has been working with therapy for his shoulder, just in the last few days added any hand or wrist exercises.  Denies any trauma or injury to the hand or wrist.   States he had an Emg in the past, but can't find the results in Epic    Past Medical History: right RTC repair Feb 2025    Medication  Medications Ordered Prior to Encounter[1]    Physical Exam  Constitutional: well developed, well nourished male in no acute distress  Psychiatric: normal mood, appropriate affect  Eyes: sclera anicteric  HENT: normocephalic/atraumatic  CV: regular rate and rhythm   Respiratory: non labored breathing  Integumentary: no rash  Neurological: moves all extremities    Right Hand Exam     Tenderness   The patient is experiencing tenderness in the radial area.    Range of Motion   The patient has normal right wrist ROM.     Muscle Strength   : 4/5     Tests   Phalen’s Sign: negative  Tinel's sign (median nerve): negative  Finkelstein's test: negative    Comments:  0cm DPC all fingers.  Mild diffuse edema about hand and fingers              Imaging/Lab:  X-rays were taken today which were reviewed by myself and read by myself and show no acute fracture or dislocation.  Mild degenerative changes IP, MCP, CMC of the thumb.      Assessment  Assessment: right hand pain and  edema    Plan  Plan:  History, physical exam, and imaging were reviewed with patient. Discussed trying to keep hand in arm and elbow in a more neutral position at night with bracing.  Orders entered to start working with OT hand for pain and edema control.  Continue with RICE, topical antiinflammatories.  Follow Up: as needed after trial of OT    All questions were answered for the patient prior to end of exam and patient addressed their understanding.    Denisse Donnelly PA-C  05/09/25         [1]   Current Outpatient Medications on File Prior to Visit   Medication Sig Dispense Refill    chlordiazepoxide-clidinium (Librax) 5-2.5 mg capsule Take 4 capsules by mouth 4 times a day before meals.      diclofenac sodium (Voltaren) 1 % gel gel Apply topically.      fluticasone (Flonase Allergy Relief) 50 mcg/actuation nasal spray Administer 1 spray into each nostril once daily.      ketoconazole (NIZOral) 2 % cream Apply topically 2 times a day. 15 g 0    L.acid/L.casei/B.bif/B.kenroy/FOS (PROBIOTIC BLEND ORAL) Take by mouth.      metoprolol succinate XL (Toprol-XL) 25 mg 24 hr tablet Take 1 tablet (25 mg) by mouth once daily. Do not crush or chew. (Patient not taking: Reported on 2/3/2025) 30 tablet 11    NON FORMULARY Move free      Xarelto 20 mg tablet TAKE 1 TABLET DAILY 90 tablet 3     No current facility-administered medications on file prior to visit.

## 2025-05-12 ENCOUNTER — TREATMENT (OUTPATIENT)
Dept: PHYSICAL THERAPY | Facility: CLINIC | Age: 60
End: 2025-05-12
Payer: COMMERCIAL

## 2025-05-12 DIAGNOSIS — M25.519 SHOULDER PAIN, UNSPECIFIED CHRONICITY, UNSPECIFIED LATERALITY: ICD-10-CM

## 2025-05-12 DIAGNOSIS — Z48.89 AFTERCARE FOLLOWING SURGERY: ICD-10-CM

## 2025-05-12 DIAGNOSIS — S46.011D STRAIN OF RIGHT ROTATOR CUFF CAPSULE, SUBSEQUENT ENCOUNTER: Primary | ICD-10-CM

## 2025-05-12 PROCEDURE — 97110 THERAPEUTIC EXERCISES: CPT | Mod: GP

## 2025-05-12 PROCEDURE — 97140 MANUAL THERAPY 1/> REGIONS: CPT | Mod: GP

## 2025-05-12 ASSESSMENT — PAIN - FUNCTIONAL ASSESSMENT: PAIN_FUNCTIONAL_ASSESSMENT: 0-10

## 2025-05-12 ASSESSMENT — PAIN SCALES - GENERAL: PAINLEVEL_OUTOF10: 0 - NO PAIN

## 2025-05-12 NOTE — PROGRESS NOTES
Physical Therapy  Physical Therapy Treatment    Patient Name: Mikey Salinas  MRN: [id number]  Today's Date: 5/12/2025  Time Calculation  Start Time: 1355  Stop Time: 1435  Time Calculation (min): 40 min      PT Therapeutic Procedures Time Entry  Manual Therapy Time Entry: 20  Therapeutic Exercise Time Entry: 20          Insurance:  Visit number: 13 of 20  Authorization info: no auth  Insurance Type: Payor: MEDICAL [facility] / Plan: [employer] [facility] HMO / Product Type: *No Product type* /      Current Problem  1. Strain of right rotator cuff capsule, subsequent encounter  Follow Up In Physical Therapy      2. Shoulder pain, unspecified chronicity, unspecified laterality  Follow Up In Physical Therapy      3. Aftercare following surgery  Follow Up In Physical Therapy                General:  General  Reason for Referral: R large RTC repair  Referred By: Rosanna Allen PA-C    Precautions:  Precautions  Precautions Comment: Scilosis, large RTC repair  Medical History Form: Reviewed (scanned into chart)    Subjective:  Patient reports he is feeling pretty good overall. No pain just sore and stiff. Hand follow up went well, has a referral for OT in the system.     Pain:  Pain Assessment: 0-10  0-10 (Numeric) Pain Score: 0 - No pain just sore    ARTHROSCOPY, SHOULDER, WITH ROTATOR CUFF REPAIR  88000 - [place] SURGICAL ARTHROSCOPY SHOULDER W/ROTATOR CUFF RPR     Debridement Shoulder(Beachchair vascular table, Tennet table, Arthrex implant, Regeneten)  09308 - [place] SURGICAL ARTHROSCOPY SHOULDER XTNSV DBRDMT 3+   Findings: Large 3 cm rotator cuff tear with severe tendinopathy, biceps rupture, degenerative labrum tearing, partial upper border subscapularis tendon tear         Objective:   ROM  *denotes end range pain/discomfort    Cervical AROM (Degrees)    Flexion: 60  Extension: 50  (L) Side Bend: 40  (R) Side Bend: 40  (L) Rotation: 80  (R) Rotation: 80      Shoulder AROM (Degrees)  Deferred due to post-op  status    (R)  (L)  Flexion:      Abduction:        Functional ER:        Functional IR:            Shoulder PROM (Degrees) updated 5/6       (R)  (L)  Flexion: 150*  wnl      Abduction: 115*  wnl     ER at 0:  40*  wnl     IR at 0:  belly  wnl         ER at 45: 40*  wnl      IR at 90: NT  wnl          Elbow AROM (Degrees)      (R)  (L)  Flexion: 135  135      Extension: -1  0         Elbow PROM (Degrees)      (R)  (L)  Flexion: 135  135      Extension: 0  0             Strength Testing  Deferred due to post-op status  Shoulder    (R)  (L)  Flexion:         Abduction:        ER:         IR:             Elbow    (R)  (L)  Flexion:          Extension:            Periscapular Musculature    (R)  (L)  Upper Traps:        Middle Traps:        Lower Traps:        Rhomboids:            Posture: moderate rounded shoulders      Palpation: SILT, mild tenderness around portals. Normal for post-op status.       Joint Mobility: NT      Observation: Portals are well approximated and healing w/o signs or symptoms of infection.           Special Tests updated 5/12  Cozens: neg  Aparicio: neg  Phalen: pos   Reverse phalen: pos       Outcome Measures:        Treatments:    Manual Therapy: 20'  PROM Scaption, abduction, ER and 20 and 45 degs to tolerance following precautions  Gentle GHJ inferior glides grade 1-2  Gentle GHJ AP glides grade 1-2      There-ex: 20'  Seated pulleys x5'  Yellow stroops TB rows 2x12  Yellow stroops TB T raise, discontinued  Yellow stroops TB pulldowns 2x12  Standing scaption, discontinued  Wall slide 20 deg angle 2x10  Cable tricep push down w/ straight bar and 12.5# 3x12-15    * = added to HEP.  Sheet with exercise descriptions and images issued.  Skilled intervention utilized in the appropriate selection & application of above exercises.  Verbal and tactile cues provided for proper form and technique.  Pt. demonstrated appropriate form & verbalized understanding of optimal technique for above  exercises.        [email or web address]  Access Code: [email or web address]    EDUCATION: Home exercise program, plan of care, activity modifications, pain management, and injury pathology       Assessment:   Patient tolerated today's session w/ expected muscle fatigue and soreness. Demonstrates improvements in shoulder ROM and scapular strength displayed through progression/improved performance of there-ex. Had difficulty w/ T raises and scaptions due to shrugging, exercises were discontinued. Patient is progressing slowly s/p RTC repair and will benefit from ongoing PT services to continue to progress shoulder ROM, cuff and scapular strengthening per protocol to reach established goals to return to PLOF.        Plan:  Continue P/AAROM progression as tolerated and manual therapy prn for symptom control. Progress isometric and wall slide volume as tolerated. Trial of DN and wrist mobs.     Goals: Set and discussed today  Active       R RTC repair       STGs (Progressing)       Start:  04/01/25    Expected End:  06/09/25       1. Demonstrate independence with home exercise program  2. Tolerate increased exercise without adverse reaction  3. Demonstrate improved posture & proper body mechanics throughout session  4. Increase shoulder scaption, FF and abduction ROM to at least 125 each to progress towards LTGs ad improve ADLs.  5. Increase gross shoulder strength to at least 3+ to progress towards LTGs.  6. Report decrease in pain by > 2 points to meet the MCID         LTGs (Progressing)       Start:  04/01/25    Expected End:  07/07/25       1. Increase FF, scaption and abduction ROM to pain free + at least 160 each  2. Increase gross strength to pain free + at least 4+ to improve ADLs and allow exercise progression  3. Decrease score of Quick DASH by > 8 points to meet the MCID  4. Perform ADLs without an increase symptoms  5. Increase gross periscapular strength to pain free + at least 3+ to decrease RTC  strain and improve ADLs.  6. Increase shoulder ER to pain free + at least 60 degs to improve ADLs.                  Screening  Frequency  Date Last Completed   Spiritual and Cultural Beliefs   Screening each visit or episode of care 2/17/2025   Falls Risk Screening every ambulatory visit    Pain Screening annually at primary care visit  1/6/2025   Domestic Violence screening annually at primary care visit 1/6/2025   Depression Screening annually in the primary care setting 1/6/2025   Suicide Risk Screening annually in the primary care setting 2/17/2025   Nutrition and Food Insecurity   Screening at least annually at primary care visit     Key Learner annually in the primary care setting 2/17/2025   Drug Screen  2/17/2025  8:32 AM   Alcohol Screen  2/17/2025  8:32 AM   Advance Directive  1/6/2025       Plan of care was developed with input and agreement by the patient      Didier Lo, PT, ATC

## 2025-05-13 ENCOUNTER — APPOINTMENT (OUTPATIENT)
Dept: PHYSICAL THERAPY | Facility: CLINIC | Age: 60
End: 2025-05-13
Payer: COMMERCIAL

## 2025-05-15 ENCOUNTER — APPOINTMENT (OUTPATIENT)
Dept: ORTHOPEDIC SURGERY | Facility: CLINIC | Age: 60
End: 2025-05-15
Payer: COMMERCIAL

## 2025-05-15 ENCOUNTER — TREATMENT (OUTPATIENT)
Dept: PHYSICAL THERAPY | Facility: CLINIC | Age: 60
End: 2025-05-15
Payer: COMMERCIAL

## 2025-05-15 DIAGNOSIS — S46.011D STRAIN OF RIGHT ROTATOR CUFF CAPSULE, SUBSEQUENT ENCOUNTER: ICD-10-CM

## 2025-05-15 DIAGNOSIS — M25.519 SHOULDER PAIN, UNSPECIFIED CHRONICITY, UNSPECIFIED LATERALITY: ICD-10-CM

## 2025-05-15 DIAGNOSIS — Z48.89 AFTERCARE FOLLOWING SURGERY: ICD-10-CM

## 2025-05-15 PROCEDURE — 97140 MANUAL THERAPY 1/> REGIONS: CPT | Mod: GP

## 2025-05-15 PROCEDURE — 97032 APPL MODALITY 1+ESTIM EA 15: CPT | Mod: GP

## 2025-05-15 ASSESSMENT — PAIN SCALES - GENERAL: PAINLEVEL_OUTOF10: 0 - NO PAIN

## 2025-05-15 ASSESSMENT — PAIN - FUNCTIONAL ASSESSMENT: PAIN_FUNCTIONAL_ASSESSMENT: 0-10

## 2025-05-15 NOTE — PROGRESS NOTES
Physical Therapy  Physical Therapy Treatment    Patient Name: Mikey Salinas  MRN: [id number]  Today's Date: 5/15/2025  Time Calculation  Start Time: 0915  Stop Time: 1000  Time Calculation (min): 45 min      PT Therapeutic Procedures Time Entry  Manual Therapy Time Entry: 25  Therapeutic Exercise Time Entry: 5   PT Modalities Time Entry  E-Stim (Attended) Time Entry: 15        Insurance:  Visit number: 14 of 20  Authorization info: no auth  Insurance Type: Payor: [employer]ICAL [employer] [facility] / Plan: MEDICAL [facility] HMO / Product Type: *No Product type* /      Current Problem  1. Shoulder pain, unspecified chronicity, unspecified laterality  Follow Up In Physical Therapy      2. Aftercare following surgery  Follow Up In Physical Therapy      3. Strain of right rotator cuff capsule, subsequent encounter  Follow Up In Physical Therapy                  General:  General  Reason for Referral: R large RTC repair  Referred By: Rosanna Allen PA-C    Precautions:  Precautions  Precautions Comment: Scilosis, large RTC repair  Medical History Form: Reviewed (scanned into chart)    Subjective:  Patient reports he is feeling pretty sore today, continues to follow HEP w/o issue. Saw neurologist yesterday who feels hand edema is vascular vs neurogenic.     Pain:  Pain Assessment: 0-10  0-10 (Numeric) Pain Score: 0 - No pain just sore    ARTHROSCOPY, SHOULDER, WITH ROTATOR CUFF REPAIR  20182 - [place] SURGICAL ARTHROSCOPY SHOULDER W/ROTATOR CUFF RPR     Debridement Shoulder(Beachchair vascular table, Tennet table, Arthrex implant, Regeneten)  [id number] - [place] SURGICAL ARTHROSCOPY SHOULDER XTNSV DBRDMT 3+   Findings: Large 3 cm rotator cuff tear with severe tendinopathy, biceps rupture, degenerative labrum tearing, partial upper border subscapularis tendon tear         Objective:   ROM  *denotes end range pain/discomfort    Cervical AROM (Degrees)    Flexion: 60  Extension: 50  (L) Side Bend: 40  (R) Side Bend: 40  (L) Rotation:  80  (R) Rotation: 80      Shoulder AROM (Degrees)  Deferred due to post-op status    (R)  (L)  Flexion:      Abduction:        Functional ER:        Functional IR:            Shoulder PROM (Degrees) updated 5/15      (R)  (L)  Flexion: 160*  wnl      Abduction: 115*  wnl     ER at 0:  50*  wnl     IR at 0:  belly  wnl         ER at 45: 50*  wnl      IR at 90: NT  wnl          Elbow AROM (Degrees)      (R)  (L)  Flexion: 135  135      Extension: 0  0         Elbow PROM (Degrees)      (R)  (L)  Flexion: 135  135      Extension: 0  0             Strength Testing  Deferred due to post-op status  Shoulder    (R)  (L)  Flexion:         Abduction:        ER:         IR:             Elbow    (R)  (L)  Flexion:          Extension:            Periscapular Musculature    (R)  (L)  Upper Traps:        Middle Traps:        Lower Traps:        Rhomboids:            Posture: moderate rounded shoulders      Palpation: SILT, mild tenderness around portals. Normal for post-op status.       Joint Mobility: NT      Observation: Portals are well approximated and healing w/o signs or symptoms of infection.           Special Tests updated 5/12  Cozens: neg  Aparicio: neg  Phalen: pos   Reverse phalen: pos       Outcome Measures:        Treatments:    Attended e-stim: 15'  Verbal and written education provided to patient this date regarding TrP dry needling and associated risks and benefits. Patient verbalizes understanding of associated risks and benefits with TrP dry needling, provides verbal consent to proceed, and denies questions at this time. Dry needling utilized this date to address ongoing pain and dysfunction in elbow and wrist extensors. Soft tissue assessment completed via manual palpation with active TrPs, muscular hypertonicity, and pain noted throughout.     Treatment: 20x30 mm needle inserted into common extensor tendon, radial nerve homeostatic point x4 total   20 x30 mm needle inserted into distal extensor compartment 4 x2  total  IFC electrical DN added to patient tolerance and visibile muscle contraction at level 2.5 intensity x10'  Time included for set up and dosage    Needle site clear and without adverse reaction immediately post needle removal. Patient reports reduction in pain and tightness immediately post treatment with improvements in muscular tone, flexibility, and ROM noted as well. Patient advised to call PT if excessive soreness, bruising, or adverse event is noted post needling. Patient verbalizes understanding and denies questions at this time.        Manual Therapy: 25'  PROM Scaption, abduction, ER and 20 and 45 degs to tolerance following precautions  Distal RUJ volar glides grade 3-4  Volar carpal mobs  Gentle GHJ inferior glides grade 2-3  Gentle GHJ AP glides grade 2-3      There-ex: 5'  Seated pulleys x5'    * = added to HEP.  Sheet with exercise descriptions and images issued.  Skilled intervention utilized in the appropriate selection & application of above exercises.  Verbal and tactile cues provided for proper form and technique.  Pt. demonstrated appropriate form & verbalized understanding of optimal technique for above exercises.        [email or web address]  Access Code: [email or web address]    EDUCATION: Home exercise program, plan of care, activity modifications, pain management, and injury pathology       Assessment:   Patient tolerated today's session w/ expected muscle soreness. Demonstrates improvements in shoulder ROM. Today's session was heavy hands on to incorporate hand work. Patient appears to have responded well to attended e-stim and wrist/carpal mobs. He is progressing s/p RTC repair, hand edema and pain is main ADL limiting factor. He is going to schedule w/ OT soon. Patient will benefit from ongoing PT services to continue to progress shoulder ROM, cuff and scapular strengthening, as well as wrist/hand work prn to reach established goals to return to PLOF.            Plan:  Continue P/AAROM progression as tolerated and manual therapy prn for symptom control. Progress isometric and wall slide volume as tolerated. Assess response to DN and wrist mobs and continue prn.     Goals: Set and discussed today  Active       R RTC repair       STGs (Progressing)       Start:  04/01/25    Expected End:  06/09/25       1. Demonstrate independence with home exercise program  2. Tolerate increased exercise without adverse reaction  3. Demonstrate improved posture & proper body mechanics throughout session  4. Increase shoulder scaption, FF and abduction ROM to at least 125 each to progress towards LTGs ad improve ADLs.  5. Increase gross shoulder strength to at least 3+ to progress towards LTGs.  6. Report decrease in pain by > 2 points to meet the MCID         LTGs (Progressing)       Start:  04/01/25    Expected End:  07/07/25       1. Increase FF, scaption and abduction ROM to pain free + at least 160 each  2. Increase gross strength to pain free + at least 4+ to improve ADLs and allow exercise progression  3. Decrease score of Quick DASH by > 8 points to meet the MCID  4. Perform ADLs without an increase symptoms  5. Increase gross periscapular strength to pain free + at least 3+ to decrease RTC strain and improve ADLs.  6. Increase shoulder ER to pain free + at least 60 degs to improve ADLs.                  Screening  Frequency  Date Last Completed   Spiritual and Cultural Beliefs   Screening each visit or episode of care 2/17/2025   Falls Risk Screening every ambulatory visit    Pain Screening annually at primary care visit  1/6/2025   Domestic Violence screening annually at primary care visit 1/6/2025   Depression Screening annually in the primary care setting 1/6/2025   Suicide Risk Screening annually in the primary care setting 2/17/2025   Nutrition and Food Insecurity   Screening at least annually at primary care visit     Key Learner annually in the primary care setting  2/17/2025   Drug Screen  2/17/2025  8:32 AM   Alcohol Screen  2/17/2025  8:32 AM   Advance Directive  1/6/2025       Plan of care was developed with input and agreement by the patient      Didier Lo, PT, ATC

## 2025-05-20 ENCOUNTER — TREATMENT (OUTPATIENT)
Dept: PHYSICAL THERAPY | Facility: CLINIC | Age: 60
End: 2025-05-20
Payer: COMMERCIAL

## 2025-05-20 DIAGNOSIS — M25.519 SHOULDER PAIN, UNSPECIFIED CHRONICITY, UNSPECIFIED LATERALITY: ICD-10-CM

## 2025-05-20 DIAGNOSIS — Z48.89 AFTERCARE FOLLOWING SURGERY: ICD-10-CM

## 2025-05-20 DIAGNOSIS — S46.011D STRAIN OF RIGHT ROTATOR CUFF CAPSULE, SUBSEQUENT ENCOUNTER: Primary | ICD-10-CM

## 2025-05-20 PROCEDURE — 97140 MANUAL THERAPY 1/> REGIONS: CPT | Mod: GP

## 2025-05-20 PROCEDURE — 97110 THERAPEUTIC EXERCISES: CPT | Mod: GP

## 2025-05-20 PROCEDURE — 97032 APPL MODALITY 1+ESTIM EA 15: CPT | Mod: GP

## 2025-05-20 ASSESSMENT — PAIN - FUNCTIONAL ASSESSMENT: PAIN_FUNCTIONAL_ASSESSMENT: 0-10

## 2025-05-20 ASSESSMENT — PAIN SCALES - GENERAL: PAINLEVEL_OUTOF10: 0 - NO PAIN

## 2025-05-20 NOTE — PROGRESS NOTES
Physical Therapy  Physical Therapy Treatment    Patient Name: Mikey Salinas  MRN: [id number]  Today's Date: 5/20/2025  Time Calculation  Start Time: 0830  Stop Time: 0915  Time Calculation (min): 45 min      PT Therapeutic Procedures Time Entry  Manual Therapy Time Entry: 20  Therapeutic Exercise Time Entry: 10   PT Modalities Time Entry  E-Stim (Attended) Time Entry: 15        Insurance:  Visit number: 15 of 20  Authorization info: no auth  Insurance Type: Payor: [employer] [facility] / Plan: MEDICAL [facility] HMO / Product Type: *No Product type* /      Current Problem  1. Strain of right rotator cuff capsule, subsequent encounter  Follow Up In Physical Therapy      2. Aftercare following surgery  Follow Up In Physical Therapy      3. Shoulder pain, unspecified chronicity, unspecified laterality  Follow Up In Physical Therapy                    General:  General  Reason for Referral: R large RTC repair  Referred By: Rosanna Allen PA-C    Precautions:  Precautions  Precautions Comment: Scilosis, large RTC repair  Medical History Form: Reviewed (scanned into chart)    Subjective:  Patient reports he is feeling sore today. Slipped and fell off of his patio and landed on his affected side hip. Shoulder feels sore but not painful. Felt hand treatments last session were helpful and wants to continue. Will be seeing OT on 5/28.     Pain:  Pain Assessment: 0-10  0-10 (Numeric) Pain Score: 0 - No painJust soreness    ARTHROSCOPY, SHOULDER, WITH ROTATOR CUFF REPAIR  54938 - [place] SURGICAL ARTHROSCOPY SHOULDER W/ROTATOR CUFF RPR     Debridement Shoulder(Beachchair vascular table, Tennet table, Arthrex implant, Regeneten)  [id number] - [place] SURGICAL ARTHROSCOPY SHOULDER XTNSV DBRDMT 3+   Findings: Large 3 cm rotator cuff tear with severe tendinopathy, biceps rupture, degenerative labrum tearing, partial upper border subscapularis tendon tear         Objective:   ROM  *denotes end range pain/discomfort    Cervical AROM  (Degrees)    Flexion: 60  Extension: 50  (L) Side Bend: 40  (R) Side Bend: 40  (L) Rotation: 80  (R) Rotation: 80      Shoulder AROM (Degrees)  Deferred due to post-op status    (R)  (L)  Flexion:      Abduction:        Functional ER:        Functional IR:            Shoulder PROM (Degrees) updated 5/20      (R)  (L)  Flexion: 160*  wnl      Abduction: 160*  wnl     ER at 0:  60*  wnl     IR at 0:  belly  wnl         ER at 45: 60*  wnl      IR at 90: NT  wnl          Elbow AROM (Degrees)      (R)  (L)  Flexion: 135  135      Extension: 0  0         Elbow PROM (Degrees)      (R)  (L)  Flexion: 135  135      Extension: 0  0             Strength Testing  Deferred due to post-op status  Shoulder    (R)  (L)  Flexion:         Abduction:        ER:         IR:             Elbow    (R)  (L)  Flexion:          Extension:            Periscapular Musculature    (R)  (L)  Upper Traps:        Middle Traps:        Lower Traps:        Rhomboids:            Posture: moderate rounded shoulders      Palpation: SILT, mild tenderness around portals. Normal for post-op status.       Joint Mobility: NT      Observation: Portals are well approximated and healing w/o signs or symptoms of infection.           Special Tests updated 5/12  Cozens: neg  Aparicio: neg  Phalen: pos   Reverse phalen: pos       Outcome Measures:        Treatments:    Attended e-stim: 15'  Verbal and written education provided to patient this date regarding TrP dry needling and associated risks and benefits. Patient verbalizes understanding of associated risks and benefits with TrP dry needling, provides verbal consent to proceed, and denies questions at this time. Dry needling utilized this date to address ongoing pain and dysfunction in elbow and wrist extensors. Soft tissue assessment completed via manual palpation with active TrPs, muscular hypertonicity, and pain noted throughout.     Treatment: 20x30 mm needle inserted into common extensor tendon, radial nerve  "homeostatic point x4 total   20 x30 mm needle inserted into distal extensor compartment 4 x2 total  IFC electrical DN added to patient tolerance and visibile muscle contraction at level 2.5 intensity x10'  Time included for set up and dosage    Needle site clear and without adverse reaction immediately post needle removal. Patient reports reduction in pain and tightness immediately post treatment with improvements in muscular tone, flexibility, and ROM noted as well. Patient advised to call PT if excessive soreness, bruising, or adverse event is noted post needling. Patient verbalizes understanding and denies questions at this time.        Manual Therapy: 20'  PROM Scaption, abduction, ER and 20 and 45 degs to tolerance following precautions  Distal RUJ volar glides grade 3-4  Volar carpal mobs  Gentle GHJ inferior glides grade 2-3  Gentle GHJ AP glides grade 2-3      There-ex: 10'  UBE 3' fwd, 3' bwd  Abduction wall walks w/ 2-3\" holds 2x10    * = added to HEP.  Sheet with exercise descriptions and images issued.  Skilled intervention utilized in the appropriate selection & application of above exercises.  Verbal and tactile cues provided for proper form and technique.  Pt. demonstrated appropriate form & verbalized understanding of optimal technique for above exercises.        [email or web address]  Access Code: [email or web address]    EDUCATION: Home exercise program, plan of care, activity modifications, pain management, and injury pathology       Assessment:   Patient tolerated today's session w/ decreased soreness and stiffness post session. Demonstrates improvements in shoulder ROM, as well as hand symptoms and edema. Patient is progressing slowly s/p RTC repair and will benefit from ongoing PT services to continue to progress shoulder ROM, cuff and scapular strengthening, as well as hand/wrist treatment prn per protocol to reach established goals to return to PLOF.        Plan:  Continue P/AAROM " progression as tolerated and manual therapy prn for symptom control. Progress isometric, wall slide volume as tolerated as well as strengthening. Hold hand tx as he will be seeing OT the day after PT [place].     Goals: Set and discussed today  Active       R RTC repair       STGs (Progressing)       Start:  04/01/25    Expected End:  06/09/25       1. Demonstrate independence with home exercise program  2. Tolerate increased exercise without adverse reaction  3. Demonstrate improved posture & proper body mechanics throughout session  4. Increase shoulder scaption, FF and abduction ROM to at least 125 each to progress towards LTGs ad improve ADLs.  5. Increase gross shoulder strength to at least 3+ to progress towards LTGs.  6. Report decrease in pain by > 2 points to meet the MCID         LTGs (Progressing)       Start:  04/01/25    Expected End:  07/07/25       1. Increase FF, scaption and abduction ROM to pain free + at least 160 each  2. Increase gross strength to pain free + at least 4+ to improve ADLs and allow exercise progression  3. Decrease score of Quick DASH by > 8 points to meet the MCID  4. Perform ADLs without an increase symptoms  5. Increase gross periscapular strength to pain free + at least 3+ to decrease RTC strain and improve ADLs.  6. Increase shoulder ER to pain free + at least 60 degs to improve ADLs.                  Screening  Frequency  Date Last Completed   Spiritual and Cultural Beliefs   Screening each visit or episode of care 2/17/2025   Falls Risk Screening every ambulatory visit    Pain Screening annually at primary care visit  1/6/2025   Domestic Violence screening annually at primary care visit 1/6/2025   Depression Screening annually in the primary care setting 1/6/2025   Suicide Risk Screening annually in the primary care setting 2/17/2025   Nutrition and Food Insecurity   Screening at least annually at primary care visit     Key Learner annually in the primary care setting 2/17/2025    Drug Screen  2/17/2025  8:32 AM   Alcohol Screen  2/17/2025  8:32 AM   Advance Directive  1/6/2025       Plan of care was developed with input and agreement by the patient      Didier Lo, PT, ATC

## 2025-05-21 ENCOUNTER — APPOINTMENT (OUTPATIENT)
Facility: CLINIC | Age: 60
End: 2025-05-21
Payer: COMMERCIAL

## 2025-05-27 ENCOUNTER — TREATMENT (OUTPATIENT)
Dept: PHYSICAL THERAPY | Facility: CLINIC | Age: 60
End: 2025-05-27
Payer: COMMERCIAL

## 2025-05-27 DIAGNOSIS — M25.519 SHOULDER PAIN, UNSPECIFIED CHRONICITY, UNSPECIFIED LATERALITY: ICD-10-CM

## 2025-05-27 DIAGNOSIS — Z48.89 AFTERCARE FOLLOWING SURGERY: ICD-10-CM

## 2025-05-27 DIAGNOSIS — S46.011D STRAIN OF RIGHT ROTATOR CUFF CAPSULE, SUBSEQUENT ENCOUNTER: Primary | ICD-10-CM

## 2025-05-27 PROCEDURE — 97140 MANUAL THERAPY 1/> REGIONS: CPT | Mod: GP

## 2025-05-27 PROCEDURE — 97110 THERAPEUTIC EXERCISES: CPT | Mod: GP

## 2025-05-27 ASSESSMENT — PAIN SCALES - GENERAL: PAINLEVEL_OUTOF10: 0 - NO PAIN

## 2025-05-27 ASSESSMENT — PAIN - FUNCTIONAL ASSESSMENT: PAIN_FUNCTIONAL_ASSESSMENT: 0-10

## 2025-05-27 NOTE — PROGRESS NOTES
Physical Therapy  Physical Therapy Treatment    Patient Name: Mikey Salinas  MRN: [id number]  Today's Date: 5/27/2025  Time Calculation  Start Time: 0914  Stop Time: 0959  Time Calculation (min): 45 min      PT Therapeutic Procedures Time Entry  Manual Therapy Time Entry: 25  Therapeutic Exercise Time Entry: 20            Insurance:  Visit number: 16 of 20  Authorization info: no auth  Insurance Type: Payor: MEDICAL [facility] / Plan: [employer] [facility] HMO / Product Type: *No Product type* /      Current Problem  1. Strain of right rotator cuff capsule, subsequent encounter  Follow Up In Physical Therapy      2. Aftercare following surgery  Follow Up In Physical Therapy      3. Shoulder pain, unspecified chronicity, unspecified laterality  Follow Up In Physical Therapy                      General:  General  Reason for Referral: R large RTC repair  Referred By: Rosanna Allen PA-C    Precautions:  Precautions  Precautions Comment: Scoliosis, large RTC repair  Medical History Form: Reviewed (scanned into chart)    Subjective:  Patient reports he is feeling pretty good. Soreness frequency and intensity has been improving. Sees OT tomorrow for hand therapy.     Pain:  Pain Assessment: 0-10  0-10 (Numeric) Pain Score: 0 - No painJust soreness    ARTHROSCOPY, SHOULDER, WITH ROTATOR CUFF REPAIR  71429 - [place] SURGICAL ARTHROSCOPY SHOULDER W/ROTATOR CUFF RPR     Debridement Shoulder(Beachchair vascular table, Tennet table, Arthrex implant, Regeneten)  [id number] - [place] SURGICAL ARTHROSCOPY SHOULDER XTNSV DBRDMT 3+   Findings: Large 3 cm rotator cuff tear with severe tendinopathy, biceps rupture, degenerative labrum tearing, partial upper border subscapularis tendon tear         Objective:   ROM  *denotes end range pain/discomfort    Cervical AROM (Degrees)    Flexion: 60  Extension: 50  (L) Side Bend: 40  (R) Side Bend: 40  (L) Rotation: 80  (R) Rotation: 80      Shoulder AROM (Degrees) updated 5/27  Deferred due to  post-op status    (R)  (L)  Flexion: 135  180   Abduction: 115  180     ER at 0:  50  90     IR at 0:  belly  belly         Shoulder PROM (Degrees) updated 5/27      (R)  (L)  Flexion: 165*  wnl      Abduction: 160*  wnl     ER at 0:  60*  wnl     IR at 0:  belly  wnl         ER at 45: 60*  wnl      IR at 90: 45  wnl      ER at 90: 60    Elbow AROM (Degrees)      (R)  (L)  Flexion: 135  135      Extension: 0  0         Elbow PROM (Degrees)      (R)  (L)  Flexion: 135  135      Extension: 0  0             Strength Testing HHD testing in seated     Shoulder    (R)  (L)  Flexion: 7.1  12.1      Abduction: 3.7  14.3      ER:  10.1  20.0     IR:  16.5  21.3           Elbow    (R)  (L)  Flexion:          Extension:            Periscapular Musculature    (R)  (L)  Upper Traps:        Middle Traps:        Lower Traps:        Rhomboids:            Posture: moderate rounded shoulders      Palpation: SILT, mild tenderness around portals. Normal for post-op status.       Joint Mobility: NT      Observation: Portals are well approximated and healing w/o signs or symptoms of infection.           Special Tests updated 5/12  Cozens: neg  Aparicio: neg  Phalen: pos   Reverse phalen: pos       Outcome Measures:        Treatments:    Manual Therapy: 25'  PROM Scaption, abduction, ER and 20 and 45 degs to tolerance following precautions  Gentle GHJ inferior glides grade 2-3  Gentle GHJ AP glides grade 2-3      There-ex: 20'  UBE 3' fwd, 3' bwd  DB scaption in standing w/ 1# 3x8*  Serratus wall slides 2x8*  Cat/cows on wall x10  S/l ER w/ 1#  3x12    * = added to HEP.  Sheet with exercise descriptions and images issued.  Skilled intervention utilized in the appropriate selection & application of above exercises.  Verbal and tactile cues provided for proper form and technique.  Pt. demonstrated appropriate form & verbalized understanding of optimal technique for above exercises.        [email or web address]  Access  Code: [email or web address]    EDUCATION: Home exercise program, plan of care, activity modifications, pain management, and injury pathology       Assessment:   Patient tolerated today's session w/ expected muscle fatigue and soreness. Demonstrates improvements in shoulder AROM, as well as ROM quality displayed throughout session. Had difficulty w/ serratus wall slides due to fatiguing quickly. Patient is progressing slowly s/p large RTC repair and will benefit from ongoing PT services to continue to progress shoulder ROM, cuff and scapular strengthening as tolerated per protocol to reach established goals to return to PLOF.           Plan:  Continue P/AAROM progression as tolerated and manual therapy prn for symptom control. Assess response to hand and PT session from earlier. Deltoid isometrics, scaption ER/IR.     Goals: Set and discussed today  Active       R RTC repair       STGs (Met)       Start:  04/01/25    Expected End:  06/09/25    Resolved:  05/27/25    1. Demonstrate independence with home exercise program  2. Tolerate increased exercise without adverse reaction  3. Demonstrate improved posture & proper body mechanics throughout session  4. Increase shoulder scaption, FF and abduction ROM to at least 125 each to progress towards LTGs ad improve ADLs.  5. Increase gross shoulder strength to at least 3+ to progress towards LTGs.  6. Report decrease in pain by > 2 points to meet the MCID         LTGs (Progressing)       Start:  04/01/25    Expected End:  07/07/25       1. Increase FF, scaption and abduction ROM to pain free + at least 160 each  2. Increase gross strength to pain free + at least 4+ to improve ADLs and allow exercise progression  3. Decrease score of Quick DASH by > 8 points to meet the MCID  4. Perform ADLs without an increase symptoms  5. Increase gross periscapular strength to pain free + at least 3+ to decrease RTC strain and improve ADLs.  6. Increase shoulder ER to pain free + at least 60 degs to  improve ADLs.                  Screening  Frequency  Date Last Completed   Spiritual and Cultural Beliefs   Screening each visit or episode of care 2/17/2025   Falls Risk Screening every ambulatory visit    Pain Screening annually at primary care visit  1/6/2025   Domestic Violence screening annually at primary care visit 1/6/2025   Depression Screening annually in the primary care setting 1/6/2025   Suicide Risk Screening annually in the primary care setting 2/17/2025   Nutrition and Food Insecurity   Screening at least annually at primary care visit     Key Learner annually in the primary care setting 2/17/2025   Drug Screen  2/17/2025  8:32 AM   Alcohol Screen  2/17/2025  8:32 AM   Advance Directive  1/6/2025       Plan of care was developed with input and agreement by the patient      Didier Lo, PT, ATC

## 2025-05-28 ENCOUNTER — EVALUATION (OUTPATIENT)
Dept: OCCUPATIONAL THERAPY | Facility: CLINIC | Age: 60
End: 2025-05-28
Payer: COMMERCIAL

## 2025-05-28 DIAGNOSIS — R60.0 HAND EDEMA: Primary | ICD-10-CM

## 2025-05-28 DIAGNOSIS — R29.898 DECREASED GRIP STRENGTH OF RIGHT HAND: ICD-10-CM

## 2025-05-28 PROCEDURE — 97165 OT EVAL LOW COMPLEX 30 MIN: CPT | Mod: GO

## 2025-05-28 NOTE — PROGRESS NOTES
Occupational Therapy  Occupational Therapy Orthopedic Evaluation    Patient Name: Mikey Salinas  MRN: [id number]  Today's Date: 5/28/2025  Time Calculation  Start Time: 0825  Stop Time: 0900  Time Calculation (min): 35 min  OT Evaluation Time Entry  OT Evaluation (Low) Time Entry: 35,  ,      Insurance:  Visit number: 1 of 20  Authorization info: no auth  Insurance Type: MMO    General:  Reason for visit: R hand pain and swelling   Referred by: Denisse Donnelly     Current Problem  Problem List Items Addressed This Visit           ICD-10-CM    Hand edema R60.0    Decreased  strength of right hand - Primary R29.898       Precautions: none         Medical History Form: Reviewed (scanned into chart)    Subjective:   Chief Complaint: R hand pain   Onset: 3/31/2025  YOON: Insidious Reported hand pain 6 weeks post RCR   DOS: 2/17/2025      Hand Dominance: Left    Current Condition since injury:   better      PAIN     Location: R hand, ring finger over A1 pulley   Description: sharp, intermittent  Aggravating Factors: Gripping/pinching, Opening jars/containers, Finger/Thumb Flexion, and Finger/Thumb extension   Relieving Factors: Rest, Elevation, Compression, and Stretching     Relevant Information (PMH & Previous Tests/Imaging): Neurology   Previous Interventions/Treatments: Physical Therapy/Occupational Therapy     Prior Level of Function (PLOF)  Exercise/Physical Activity: rides motorcycle   Work/School: Retired   Current ADL/IADL Status: Independent      Patients Living Environment: Reviewed and no concern    Primary Language: English    Pt goals for therapy: regain strength and use of hand     Red Flags: Do you have any of the following? No  Fever/chills, unexplained weight changes, dizziness/fainting, unexplained change in bowel or bladder functions, unexplained malaise or muscle weakness, night pain/sweats, numbness or tingling    Objective:    Right Hand AROM (degrees)   MCP PIP DIP DPC   IF     1   MF    1   RF     1   SF    1   Thumb       Thumb RABD       Thumb PABD       Digit abd/add strength: 3/5  Does have some mild thenar atrophy  Left Hand AROM (degrees)   MCP PIP DIP DPC   IF     0   MF    0   RF    0   SF    0   Thumb       Thumb RABD       Thumb PABD          Hand Strength Measures (lbs)   R L   Dynamometer  55 100   Lateral Pinch 15 20   3jaw Pinch 10 20        Special Tests  Carpals  Whitaker's test:   Lunotriquetral Shuck/Shear:   Midcarpal Shift Test:   DRUJ Instability/Piano Key:     Wrist Tendon   Finkelstein's(DeQuervain's):   CTS  Carpal Compression: negative  Phalen: negative   Reverse Phalen:   Tinels at Carpal tunnel:   TFCC   Ulnar Fovea Sign:   TFCC Load Test:   Supination Lift Test:     Finger/Thumb  CMC Grind:   Froment's Sign:   Thumb UCL:   Angelina's Sign:   Wartenburg's Sign:   ORL Tightness:   Intrinsic Tightness: positive  Extrinsic Tightness:   Stefano's Test:   Rudy's Test:     AIN Weakness: negative   PIN Weakness: negative     Physical Observation: mild swelling base of digits   Edema: 23.5 cm DPC R hand                22.5 cm DPC L hand     Sensory: wnl   Numbness/Tingling: denies tingling         Outcome Measures:  UEFI: 51/80    EDUCATION: home exercise program, plan of care, activity modifications, pain management, and injury pathology   Access Code: [email or web address]  URL: [email or web address]  Date: 05/28/2025  Prepared by: Héctor Brooks    Exercises  - Putty Squeezes  - 1 x daily - 7 x weekly - 3 sets - 10 reps  - Key Pinch with Putty  - 1 x daily - 7 x weekly - 3 sets - 10 reps  - Finger Adduction with Putty  - 1 x daily - 7 x weekly - 3 sets - 10 reps  - Thumb Opposition with Putty  - 1 x daily - 7 x weekly - 3 sets - 10 reps  - Seated Single Digit Intrinsic Stretch  - 1 x daily - 7 x weekly - 1 sets - 2 reps - 20 hold  - Seated Wrist Flexor Hook Fist Tendon Gliding  - 3-4 x daily - 7 x weekly - 3 sets - 10 reps    Goals:  1. Pt will increase R  strength 20 lbs to  improve ability to open jars   2. Pt will be able to touch each digit to DPC to improve grasp for ADL's   3. Pt will have 0/10 R hand pain with active use  4. Pt will have 1 cm improvement R DPC girth to improve pain and function for ADL's  5. Pt will be independent with HEP to support progress    Plan of care was developed with input and agreement by the patient    Treatments:     Modalities:       min       Therapeutic Exercise:    min       Manual Therapy:      min       Therapeutic Activity:     min       Neuromuscular Re-education:   min       Orthosis:       min      Wound Care:      min      Self Care:      min      Other Treatment:     min      Assessment: Patient is a 58 yo male  s/p RCR on 2/17/2025 who developed R hand pain and swelling 6 weeks post op  resulting in limited participation in pain-free ADLs and inability to perform at their prior level of function. Pt would benefit from occupational therapy to address the impairments found & listed previously in the objective section in order to return to safe and pain-free ADLs and prior level of function.       Clinical Presentation: Evolving with changing characteristics  Personal Factors Impacting Care: None    Plan:      Planned Interventions include: therapeutic exercise, therapeutic activity, self-care home management, manual therapy, therapeutic activities, electric stimulation, fluidotherapy, ultrasound, kinesiotaping.   Frequency: Every Other Week  Duration: 4-6 weeks    Ambulatory Screening Summary:      Screening  Frequency  Date Last Completed   Spiritual and Cultural Beliefs   Screening each visit or episode of care 2/17/2025   Falls Risk Screening every ambulatory visit    Pain Screening annually at primary care visit  1/6/2025   Domestic Violence screening annually at primary care visit 1/6/2025   Depression Screening annually in the primary care setting 1/6/2025   Suicide Risk Screening annually in the primary care setting 2/17/2025    Nutrition and Food Insecurity   Screening at least annually at primary care visit     Key Learner annually in the primary care setting 2/17/2025   Drug Screen  2/17/2025  8:32 AM   Alcohol Screen  2/17/2025  8:32 AM   Advance Directive  1/6/2025       Héctor Brooks, OT

## 2025-05-29 ENCOUNTER — TREATMENT (OUTPATIENT)
Dept: PHYSICAL THERAPY | Facility: CLINIC | Age: 60
End: 2025-05-29
Payer: COMMERCIAL

## 2025-05-29 ENCOUNTER — APPOINTMENT (OUTPATIENT)
Dept: PRIMARY CARE | Facility: CLINIC | Age: 60
End: 2025-05-29
Payer: COMMERCIAL

## 2025-05-29 VITALS
BODY MASS INDEX: 32.87 KG/M2 | SYSTOLIC BLOOD PRESSURE: 122 MMHG | HEIGHT: 70 IN | TEMPERATURE: 98.1 F | HEART RATE: 88 BPM | DIASTOLIC BLOOD PRESSURE: 80 MMHG | OXYGEN SATURATION: 95 % | WEIGHT: 229.6 LBS

## 2025-05-29 DIAGNOSIS — Z48.89 AFTERCARE FOLLOWING SURGERY: ICD-10-CM

## 2025-05-29 DIAGNOSIS — R22.31 LOCALIZED SWELLING ON RIGHT HAND: Primary | ICD-10-CM

## 2025-05-29 DIAGNOSIS — S46.011D STRAIN OF RIGHT ROTATOR CUFF CAPSULE, SUBSEQUENT ENCOUNTER: Primary | ICD-10-CM

## 2025-05-29 DIAGNOSIS — M25.519 SHOULDER PAIN, UNSPECIFIED CHRONICITY, UNSPECIFIED LATERALITY: ICD-10-CM

## 2025-05-29 PROCEDURE — 97140 MANUAL THERAPY 1/> REGIONS: CPT | Mod: GP

## 2025-05-29 PROCEDURE — 97110 THERAPEUTIC EXERCISES: CPT | Mod: GP

## 2025-05-29 PROCEDURE — 99213 OFFICE O/P EST LOW 20 MIN: CPT | Performed by: FAMILY MEDICINE

## 2025-05-29 PROCEDURE — 3008F BODY MASS INDEX DOCD: CPT | Performed by: FAMILY MEDICINE

## 2025-05-29 ASSESSMENT — PAIN SCALES - GENERAL
PAINLEVEL_OUTOF10: 4
PAINLEVEL_OUTOF10: 0 - NO PAIN

## 2025-05-29 ASSESSMENT — ENCOUNTER SYMPTOMS
DEPRESSION: 0
LOSS OF SENSATION IN FEET: 0
OCCASIONAL FEELINGS OF UNSTEADINESS: 0

## 2025-05-29 ASSESSMENT — PAIN - FUNCTIONAL ASSESSMENT: PAIN_FUNCTIONAL_ASSESSMENT: 0-10

## 2025-05-29 NOTE — PROGRESS NOTES
Physical Therapy  Physical Therapy Treatment    Patient Name: Mikey Salinas  MRN: [id number]  Today's Date: 5/29/2025  Time Calculation  Start Time: 0915  Stop Time: 0955  Time Calculation (min): 40 min      PT Therapeutic Procedures Time Entry  Manual Therapy Time Entry: 20  Therapeutic Exercise Time Entry: 20            Insurance:  Visit number: 17 of 20  Authorization info: no auth  Insurance Type: Payor: MEDICAL [facility] / Plan: [facility] HMO / Product Type: *No Product type* /      Current Problem  1. Strain of right rotator cuff capsule, subsequent encounter  Follow Up In Physical Therapy      2. Aftercare following surgery  Follow Up In Physical Therapy      3. Shoulder pain, unspecified chronicity, unspecified laterality  Follow Up In Physical Therapy                General:  General  Reason for Referral: R large RTC repair  Referred By: Rosanna Allen PA-C    Precautions:  Precautions  Precautions Comment: Scoliosis, large RTC repair  Medical History Form: Reviewed (scanned into chart)    Subjective:  Patient reports he is doing pretty well. Hand therapy initial appointment went well. No new issues.     Pain:  Pain Assessment: 0-10  0-10 (Numeric) Pain Score: 0 - No painJust soreness    ARTHROSCOPY, SHOULDER, WITH ROTATOR CUFF REPAIR  02223 - [facility] SURGICAL ARTHROSCOPY SHOULDER W/ROTATOR CUFF RPR     Debridement Shoulder(Beachchair vascular table, Tennet table, Arthrex implant, Regeneten)  10476 - [place] SURGICAL ARTHROSCOPY SHOULDER XTNSV DBRDMT 3+   Findings: Large 3 cm rotator cuff tear with severe tendinopathy, biceps rupture, degenerative labrum tearing, partial upper border subscapularis tendon tear         Objective:   ROM  *denotes end range pain/discomfort    Cervical AROM (Degrees)    Flexion: 60  Extension: 50  (L) Side Bend: 40  (R) Side Bend: 40  (L) Rotation: 80  (R) Rotation: 80      Shoulder AROM (Degrees) updated 5/27  Deferred due to post-op  "status    (R)  (L)  Flexion: 135  180   Abduction: 115  180     ER at 0:  50  90     IR at 0:  belly  belly         Shoulder PROM (Degrees) updated 5/27       (R)  (L)  Flexion: 165*  wnl      Abduction: 160*  wnl     ER at 0:  60*  wnl     IR at 0:  belly  wnl         ER at 45: 60*  wnl      IR at 90: 45  wnl      ER at 90: 60    Elbow AROM (Degrees)      (R)  (L)  Flexion: 135  135      Extension: 0  0         Elbow PROM (Degrees)      (R)  (L)  Flexion: 135  135      Extension: 0  0             Strength Testing HHD testing in seated     Shoulder    (R)  (L)  Flexion: 7.1  12.1      Abduction: 3.7  14.3      ER:  10.1  20.0     IR:  16.5  21.3           Elbow    (R)  (L)  Flexion:          Extension:            Periscapular Musculature    (R)  (L)  Upper Traps:        Middle Traps:        Lower Traps:        Rhomboids:            Posture: moderate rounded shoulders      Palpation: SILT, mild tenderness around portals. Normal for post-op status.       Joint Mobility: NT      Observation: Portals are well approximated and healing w/o signs or symptoms of infection.           Special Tests updated 5/12  Cozens: neg  Aparicio: neg  Phalen: pos   Reverse phalen: pos       Outcome Measures:        Treatments:    Manual Therapy: 20'  PROM Scaption, abduction, ER and 20 and 45 degs to tolerance following precautions  Gentle GHJ inferior glides grade 2-3  Gentle GHJ AP glides grade 2-3    There-ex: 20'  UBE 3' fwd, 3' bwd  Scaption IR to plinth 3x10  Modified sleeper stretch at wall w/ 5\" holds 3x10*  Yellow perform better TB ER isometrics w/ 2-3\" holds 2x10    * = added to HEP.  Sheet with exercise descriptions and images issued.  Skilled intervention utilized in the appropriate selection & application of above exercises.  Verbal and tactile cues provided for proper form and technique.  Pt. demonstrated appropriate form & verbalized understanding of optimal technique for above " exercises.        [email or web address]  Access Code: [email or web address]    EDUCATION: Home exercise program, plan of care, activity modifications, pain management, and injury pathology       Assessment:   Patient tolerated today's session w/ expected muscle soreness and fatigue. Responded well to scaption IR, sleeper stretch and band ER isometrics. Patient is progressing slowly and will benefit from ongoing PT services to continue to progress shoulder ROM, cuff and scapular strengthening as tolerated per protocol to reach established goals to return to PLOF.         Plan:  Continue P/AAROM progression as tolerated and manual therapy prn for symptom control. Trial of gentle isotonics.      Goals: Set and discussed today  Active       R RTC repair       STGs (Met)       Start:  04/01/25    Expected End:  06/09/25    Resolved:  05/27/25    1. Demonstrate independence with home exercise program  2. Tolerate increased exercise without adverse reaction  3. Demonstrate improved posture & proper body mechanics throughout session  4. Increase shoulder scaption, FF and abduction ROM to at least 125 each to progress towards LTGs ad improve ADLs.  5. Increase gross shoulder strength to at least 3+ to progress towards LTGs.  6. Report decrease in pain by > 2 points to meet the MCID         LTGs (Progressing)       Start:  04/01/25    Expected End:  07/07/25       1. Increase FF, scaption and abduction ROM to pain free + at least 160 each  2. Increase gross strength to pain free + at least 4+ to improve ADLs and allow exercise progression  3. Decrease score of Quick DASH by > 8 points to meet the MCID  4. Perform ADLs without an increase symptoms  5. Increase gross periscapular strength to pain free + at least 3+ to decrease RTC strain and improve ADLs.  6. Increase shoulder ER to pain free + at least 60 degs to improve ADLs.                  Screening  Frequency  Date Last Completed   Spiritual and Cultural Beliefs    Screening each visit or episode of care 2/17/2025   Falls Risk Screening every ambulatory visit    Pain Screening annually at primary care visit  1/6/2025   Domestic Violence screening annually at primary care visit 1/6/2025   Depression Screening annually in the primary care setting 1/6/2025   Suicide Risk Screening annually in the primary care setting 2/17/2025   Nutrition and Food Insecurity   Screening at least annually at primary care visit     Key Learner annually in the primary care setting 2/17/2025   Drug Screen  2/17/2025  8:32 AM   Alcohol Screen  2/17/2025  8:32 AM   Advance Directive  1/6/2025       Plan of care was developed with input and agreement by the patient      Didier Lo, PT, ATC

## 2025-05-29 NOTE — PROGRESS NOTES
History Of Present Illness  Mikey Salinas is a 59 y.o. male presenting for Swelling in hand  .    HPI   He underwent arthroscopy and rotator cuff repair of the right shoulder on 2/17/2025.  Since that time he has had routine healing, undergoing physical therapy.  He reports right hand swelling.  He visited with Dr. Garcia on 4/15/2025 who recommended continuing therapy.  He saw neurology, Dr. Rankin who recommended further evaluation for livedo reticularis.      Past Medical History  Patient Active Problem List    Diagnosis Date Noted    Hand edema 05/28/2025    Decreased  strength of right hand 05/28/2025    Strain of right rotator cuff capsule 04/01/2025    Aftercare following surgery 03/28/2025    Abdominal bloating 11/18/2024    History of hypercoagulable state 11/18/2024    Pain in joint of right shoulder 06/03/2024    Arthrosis of right acromioclavicular joint 06/03/2024    Biceps strain, right, subsequent encounter 06/03/2024    Rotator cuff strain, right, subsequent encounter 06/03/2024    Sprain of right shoulder, subsequent encounter 06/03/2024    Tear of right supraspinatus tendon 06/03/2024    Tear of biceps tendon 06/03/2024    Tear of right glenoid labrum 06/03/2024    Tear of left supraspinatus tendon 06/03/2024    Impingement of left shoulder 06/03/2024    Shoulder pain 05/30/2024    Chronic bilateral low back pain 12/19/2023    Gastroesophageal reflux disease with hiatal hernia 12/04/2023    History of pulmonary embolism 10/19/2023    History of deep venous thrombosis (DVT) of distal vein of left lower extremity 10/19/2023    Anxiety 09/19/2023    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    Hyperlipidemia 09/19/2023    Lipoma 09/19/2023    Obesity 09/19/2023    Pityriasis rosea 09/19/2023    Scoliosis 09/19/2023    Age-related nuclear cataract of both eyes 07/20/2023    Unspecified rotator cuff tear or rupture of left shoulder, not specified as traumatic 12/13/2024    Strain of muscle, fascia  and tendon of other parts of biceps, unspecified arm, initial encounter 12/13/2024    Unspecified rotator cuff tear or rupture of right shoulder, not specified as traumatic 12/13/2024        Medications  Medications ordered prior to the current encounter[1]     Surgical History  He has a past surgical history that includes Knee arthroscopy w/ debridement (02/02/2015); MR angio head wo IV contrast (12/15/2016); Lipoma resection (N/A, 09/2023); Rewey tooth extraction (1982); Colonoscopy (10/2023); and Upper gastrointestinal endoscopy (7/2023).     Social History  He reports that he has never smoked. He has never been exposed to tobacco smoke. He has never used smokeless tobacco. He reports current alcohol use of about 2.0 standard drinks of alcohol per week. He reports that he does not use drugs.    Family History  Family History[2]     Allergies  Chocolate flavor, Cocoa, and Penicillins    Immunizations  Immunization History   Administered Date(s) Administered    COVID-19, mRNA, LNP-S, PF, 30 mcg/0.3 mL dose 03/24/2021, 04/14/2021, 12/13/2021    Flu vaccine (IIV4), preservative free *Check age/dose* 09/25/2018, 10/19/2023    Flu vaccine, quadrivalent, no egg protein, age 6 month or greater (FLUCELVAX) 10/11/2021, 10/13/2022    Flu vaccine, trivalent, preservative free, age 6 months and greater (Fluarix/Fluzone/Flulaval) 11/11/2024    Hepatitis A vaccine, age 19 years and greater (HAVRIX) 06/08/2022    Hepatitis A vaccine, pediatric/adolescent (HAVRIX, VAQTA) 07/03/2009, 08/03/2009    Hepatitis B vaccine, 19 yrs and under (RECOMBIVAX, ENGERIX) 07/03/2009, 08/03/2009    Influenza, injectable, MDCK, quadrivalent 11/01/2017, 10/10/2018, 12/09/2019    Influenza, seasonal, injectable 07/03/2009, 09/21/2016    MMR vaccine, subcutaneous (MMR II) 07/03/2009    Pfizer COVID-19 vaccine, 12 years and older, (30mcg/0.3mL) (Comirnaty) 11/11/2024    Pfizer COVID-19 vaccine, bivalent, age 12 years and older (30 mcg/0.3 mL)  "12/17/2022    Pfizer Gray Cap SARS-CoV-2 06/15/2022    Poliovirus vaccine, subcutaneous (IPOL) 07/03/2009    Tdap vaccine, age 7 year and older (BOOSTRIX, ADACEL) 07/03/2009, 01/30/2018, 06/08/2022    Typhoid, Parenteral 07/01/2009    Typhoid, ViCPs 06/08/2022    Zoster vaccine, recombinant, adult (SHINGRIX) 10/15/2021, 02/24/2022        ROS  Negative, except as discussed in HPI     Vitals  /80   Pulse 88   Temp 36.7 °C (98.1 °F)   Ht 1.778 m (5' 10\")   Wt 104 kg (229 lb 9.6 oz)   SpO2 95%   BMI 32.94 kg/m²      Physical Exam  Vitals reviewed.   Constitutional:       General: He is not in acute distress.  Pulmonary:      Effort: Pulmonary effort is normal.   Skin:     General: Skin is warm and dry.      Comments: Right hand with 2+ pulses, normal sensation. Mild swelling at the head of the metacarpals without induration or redness   Neurological:      Mental Status: He is alert. Mental status is at baseline.         Relevant Results  Lab Results   Component Value Date    WBC 6.3 02/03/2025    WBC 7.3 08/28/2024    HGB 15.6 02/03/2025    HGB 15.2 08/28/2024    HCT 47.9 02/03/2025    HCT 46.0 08/28/2024    MCV 88 02/03/2025    MCV 89 08/28/2024     02/03/2025     08/28/2024     Lab Results   Component Value Date     02/03/2025     08/28/2024    K 4.7 02/03/2025    K 4.1 08/28/2024     (H) 02/03/2025     (H) 08/28/2024    CO2 28 02/03/2025    CO2 25 08/28/2024    BUN 17 02/03/2025    BUN 18 08/28/2024    CREATININE 1.00 02/03/2025    CREATININE 1.00 08/28/2024    CALCIUM 9.3 02/03/2025    CALCIUM 9.3 08/28/2024    PROT 6.4 02/03/2025    PROT 6.5 08/28/2024    BILITOT 0.8 02/03/2025    BILITOT 0.4 08/28/2024    ALKPHOS 58 02/03/2025    ALKPHOS 68 08/28/2024    ALT 19 02/03/2025    ALT 20 08/28/2024    AST 15 02/03/2025    AST 17 08/28/2024    GLUCOSE 88 02/03/2025    GLUCOSE 94 08/28/2024     Lab Results   Component Value Date    HGBA1C 5.9 (H) 08/19/2024     Lab Results "   Component Value Date    TSH 0.57 08/28/2024      Lab Results   Component Value Date    CHOL 196 12/31/2024    TRIG 61 12/31/2024    HDL 51.6 12/31/2024           Assessment/Plan   Mikey was seen today for swelling in hand.  Diagnoses and all orders for this visit:  Localized swelling on right hand (Primary)  -     Referral to Vascular Medicine; Future         Counseling:   Medication education:   -Education:  The patient is counseled regarding potential side-effects of any and all new medications  -Understanding:  Patient expressed understanding of information discussed today  -Adherence:  No barriers to adherence identified    Final treatment plan is a result of shared decision making with patient.         Felipe Salcedo MD        [1]   Current Outpatient Medications   Medication Sig Dispense Refill    chlordiazepoxide-clidinium (Librax) 5-2.5 mg capsule Take 4 capsules by mouth 4 times a day before meals.      diclofenac sodium (Voltaren) 1 % gel gel Apply topically.      fluticasone (Flonase Allergy Relief) 50 mcg/actuation nasal spray Administer 1 spray into each nostril once daily.      ketoconazole (NIZOral) 2 % cream Apply topically 2 times a day. (Patient not taking: Reported on 5/30/2025) 15 g 0    L.acid/L.casei/B.bif/B.kenroy/FOS (PROBIOTIC BLEND ORAL) Take by mouth.      NON FORMULARY Move free      Xarelto 20 mg tablet TAKE 1 TABLET DAILY 90 tablet 3    metoprolol succinate XL (Toprol-XL) 25 mg 24 hr tablet Take 1 tablet (25 mg) by mouth once daily. Do not crush or chew. 30 tablet 11     No current facility-administered medications for this visit.   [2]   Family History  Problem Relation Name Age of Onset    Macular degeneration Mother Megha     Osteoporosis Mother Megha     Transient ischemic attack Mother Megha     Brain Aneurysm Mother Megha     Hypertension Mother Megha     Other (Heart valve surgery) Father Ray     Other (RHEUMATIC HEART DISEASE) Father Ray     Nephrolithiasis Father Ray     Heart  disease Father Ray     Hypertension Father Ray     Breast cancer Sister Peg 58    Irritable bowel syndrome Sister Peg     ROGER disease Sister Peg     Hypertension Sister Peg     Cancer Sister Peg     Prostate cancer Brother      Cancer Brother Yves Salinas     Cancer Other GRAND FATHER

## 2025-05-30 ENCOUNTER — OFFICE VISIT (OUTPATIENT)
Facility: CLINIC | Age: 60
End: 2025-05-30
Payer: COMMERCIAL

## 2025-05-30 VITALS
OXYGEN SATURATION: 95 % | DIASTOLIC BLOOD PRESSURE: 80 MMHG | TEMPERATURE: 98.6 F | RESPIRATION RATE: 18 BRPM | HEIGHT: 70 IN | SYSTOLIC BLOOD PRESSURE: 129 MMHG | HEART RATE: 68 BPM | WEIGHT: 229 LBS | BODY MASS INDEX: 32.78 KG/M2

## 2025-05-30 DIAGNOSIS — R00.2 PALPITATIONS: ICD-10-CM

## 2025-05-30 DIAGNOSIS — R07.89 TIGHT CHEST: ICD-10-CM

## 2025-05-30 DIAGNOSIS — E78.2 MIXED HYPERLIPIDEMIA: ICD-10-CM

## 2025-05-30 DIAGNOSIS — R07.2 PRECORDIAL PAIN: Primary | ICD-10-CM

## 2025-05-30 PROCEDURE — 3008F BODY MASS INDEX DOCD: CPT | Performed by: INTERNAL MEDICINE

## 2025-05-30 PROCEDURE — 99214 OFFICE O/P EST MOD 30 MIN: CPT | Performed by: INTERNAL MEDICINE

## 2025-05-30 ASSESSMENT — LIFESTYLE VARIABLES
HOW OFTEN DO YOU HAVE SIX OR MORE DRINKS ON ONE OCCASION: NEVER
HOW OFTEN DURING THE LAST YEAR HAVE YOU BEEN UNABLE TO REMEMBER WHAT HAPPENED THE NIGHT BEFORE BECAUSE YOU HAD BEEN DRINKING: NEVER
HOW OFTEN DO YOU HAVE A DRINK CONTAINING ALCOHOL: 2-4 TIMES A MONTH
HOW OFTEN DURING THE LAST YEAR HAVE YOU FAILED TO DO WHAT WAS NORMALLY EXPECTED FROM YOU BECAUSE OF DRINKING: NEVER
HOW OFTEN DURING THE LAST YEAR HAVE YOU FOUND THAT YOU WERE NOT ABLE TO STOP DRINKING ONCE YOU HAD STARTED: NEVER
TOTAL SCORE: 0
SKIP TO QUESTIONS 9-10: 1
HOW OFTEN DURING THE LAST YEAR HAVE YOU HAD A FEELING OF GUILT OR REMORSE AFTER DRINKING: NEVER
AUDIT TOTAL SCORE: 2
HAVE YOU OR SOMEONE ELSE BEEN INJURED AS A RESULT OF YOUR DRINKING: NO
HAS A RELATIVE, FRIEND, DOCTOR, OR ANOTHER HEALTH PROFESSIONAL EXPRESSED CONCERN ABOUT YOUR DRINKING OR SUGGESTED YOU CUT DOWN: NO
HOW OFTEN DURING THE LAST YEAR HAVE YOU NEEDED AN ALCOHOLIC DRINK FIRST THING IN THE MORNING TO GET YOURSELF GOING AFTER A NIGHT OF HEAVY DRINKING: NEVER
HOW MANY STANDARD DRINKS CONTAINING ALCOHOL DO YOU HAVE ON A TYPICAL DAY: 1 OR 2
AUDIT-C TOTAL SCORE: 2

## 2025-05-30 ASSESSMENT — ENCOUNTER SYMPTOMS
LOSS OF SENSATION IN FEET: 0
OCCASIONAL FEELINGS OF UNSTEADINESS: 0
DEPRESSION: 0

## 2025-05-30 ASSESSMENT — PAIN SCALES - GENERAL: PAINLEVEL_OUTOF10: 0-NO PAIN

## 2025-05-30 NOTE — PROGRESS NOTES
"  [facility] [place]    Interventional Cardiology    History of present illness:    Medical History[1]    Surgical History[2]    Allergies[3]     reports that he has never smoked. He has never been exposed to tobacco smoke. He has never used smokeless tobacco. He reports current alcohol use of about 2.0 standard drinks of alcohol per week. He reports that he does not use drugs.    Family History[4]    Patient's Medications   New Prescriptions    No medications on file   Previous Medications    CHLORDIAZEPOXIDE-CLIDINIUM (LIBRAX) 5-2.5 MG CAPSULE    Take 4 capsules by mouth 4 times a day before meals.    DICLOFENAC SODIUM (VOLTAREN) 1 % GEL GEL    Apply topically.    FLUTICASONE (FLONASE ALLERGY RELIEF) 50 MCG/ACTUATION NASAL SPRAY    Administer 1 spray into each nostril once daily.    KETOCONAZOLE (NIZORAL) 2 % CREAM    Apply topically 2 times a day.    L.ACID/L.CASEI/B.BIF/B.LIZZY/FOS (PROBIOTIC BLEND ORAL)    Take by mouth.    METOPROLOL SUCCINATE XL (TOPROL-XL) 25 MG 24 HR TABLET    Take 1 tablet (25 mg) by mouth once daily. Do not crush or chew.    NON FORMULARY    Move free    XARELTO 20 MG TABLET    TAKE 1 TABLET DAILY   Modified Medications    No medications on file   Discontinued Medications    No medications on file       Objective   Physical Exam  General: Patient in no acute distress   HEENT: Atraumatic normocephalic.  Neck: Supple, jugular venous pressure within normal limit.  No bruits  Lungs: Clear to auscultation bilaterally  Cardiovascular: Regular rate and rhythm, normal heart sounds, no murmurs rubs or gallops  Abdomen: Soft nontender nondistended.  Normal bowel sounds.  Extremities: Warm to touch, no edema.      Cardiographics  ECG: {findings; ec} .  Echocardiogram: {findings; echo:66950}      Lab Review   {recent labs:::\"not applicable\"}     Assessment/Plan   Problem List[5]     58 year old male patient here today following up on hx of PE a few " years ago on oral anticoagulation..  Patient following up in my office after episode of palpitations after taking Neurontin.  Patient had a stress test in July 2023 showed no ischemia.  Had a Holter monitor in September 2024 showed no major arrhythmias.  Has been feeling overall good.  Nuys any chest pain shortness of breath palpitations dizziness lightheadedness or syncope.     At this point will obtain echocardiogram as workup for dizziness palpitations the patient may have randomly.  Otherwise his blood pressure and heart rate well-controlled at home.  Stable from my standpoint.  Continue oral anticoagulation.  Will follow-up in 6 months.  Of note patient had coronary calcium score of 0 in 2023.  No indication for statin for the time being.  Will monitor his LDL levels.    Echo normal will monitor          Rodo Kaur MD              [1]   Past Medical History:  Diagnosis Date    Bursitis of shoulder     Clotting disorder (Multi) 2014    Colon polyp 10/2023    1 tiny one removed.    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    Hereditary deficiency of other clotting factors     Factor II deficiency    History of deep venous thrombosis (DVT) of distal vein of left lower extremity 10/19/2023    2014    History of pulmonary embolism 10/19/2023    2014    HL (hearing loss) 12/2023    Tenitus    Hyperlipidemia 09/19/2023    Irritable bowel syndrome 2/2023    Low back pain     Other pulmonary embolism without acute cor pulmonale 02/03/2015    Pulmonary embolism    Personal history of diseases of the blood and blood-forming organs and certain disorders involving the immune mechanism     History of hypercoagulable state    Personal history of other venous thrombosis and embolism     History of deep venous thrombosis    Scoliosis 1978    Tension type headache 09/19/2023    Traumatic tear of supraspinatus tendon of right shoulder     left shoulder as well   [2]   Past Surgical  History:  Procedure Laterality Date    COLONOSCOPY  10/2023    KNEE ARTHROSCOPY W/ DEBRIDEMENT  02/02/2015    Arthroscopy Knee Left    LIPOMA RESECTION N/A 09/2023    multiple abdominal lipoma's excised    MR HEAD ANGIO WO IV CONTRAST  12/15/2016    MR HEAD ANGIO WO IV CONTRAST [facility]    UPPER GASTROINTESTINAL ENDOSCOPY  7/2023    WISDOM TOOTH EXTRACTION  1982   [3]   Allergies  Allergen Reactions    Chocolate Flavor GI Upset    Cocoa Nausea/vomiting    Penicillins Rash   [4]   Family History  Problem Relation Name Age of Onset    Macular degeneration Mother Megha     Osteoporosis Mother Megha     Transient ischemic attack Mother Megha     Brain Aneurysm Mother Megha     Hypertension Mother Megha     Other (Heart valve surgery) Father Ray     Other (RHEUMATIC HEART DISEASE) Father Ray     Nephrolithiasis Father Ray     Heart disease Father Ray     Hypertension Father Ray     Breast cancer Sister Peg 58    Irritable bowel syndrome Sister Peg     ROGER disease Sister Peg     Hypertension Sister Peg     Cancer Sister Peg     Prostate cancer Brother      Cancer Brother Yves Salinas     Cancer Other GRAND FATHER    [5]   Patient Active Problem List  Diagnosis    Anxiety    Factor II deficiency (Multi)    Hyperlipidemia    Lipoma    Obesity    Pityriasis rosea    Scoliosis    History of pulmonary embolism    History of deep venous thrombosis (DVT) of distal vein of left lower extremity    Age-related nuclear cataract of both eyes    Gastroesophageal reflux disease with hiatal hernia    Chronic bilateral low back pain    Shoulder pain    Pain in joint of right shoulder    Arthrosis of right acromioclavicular joint    Biceps strain, right, subsequent encounter    Rotator cuff strain, right, subsequent encounter    Sprain of right shoulder, subsequent encounter    Tear of right supraspinatus tendon    Tear of biceps tendon    Tear of right glenoid labrum    Tear of left supraspinatus tendon    Impingement of left  shoulder    Abdominal bloating    History of hypercoagulable state    Unspecified rotator cuff tear or rupture of left shoulder, not specified as traumatic    Strain of muscle, fascia and tendon of other parts of biceps, unspecified arm, initial encounter    Unspecified rotator cuff tear or rupture of right shoulder, not specified as traumatic    Aftercare following surgery    Strain of right rotator cuff capsule    Hand edema    Decreased  strength of right hand      initial encounter    Unspecified rotator cuff tear or rupture of right shoulder, not specified as traumatic    Aftercare following surgery    Strain of right rotator cuff capsule    Hand edema    Decreased  strength of right hand

## 2025-06-02 ENCOUNTER — TREATMENT (OUTPATIENT)
Dept: PHYSICAL THERAPY | Facility: CLINIC | Age: 60
End: 2025-06-02
Payer: COMMERCIAL

## 2025-06-02 DIAGNOSIS — Z48.89 AFTERCARE FOLLOWING SURGERY: ICD-10-CM

## 2025-06-02 DIAGNOSIS — S46.011D STRAIN OF RIGHT ROTATOR CUFF CAPSULE, SUBSEQUENT ENCOUNTER: Primary | ICD-10-CM

## 2025-06-02 DIAGNOSIS — M25.519 SHOULDER PAIN, UNSPECIFIED CHRONICITY, UNSPECIFIED LATERALITY: ICD-10-CM

## 2025-06-02 PROCEDURE — 97140 MANUAL THERAPY 1/> REGIONS: CPT | Mod: GP

## 2025-06-02 PROCEDURE — 97110 THERAPEUTIC EXERCISES: CPT | Mod: GP

## 2025-06-02 ASSESSMENT — PAIN SCALES - GENERAL: PAINLEVEL_OUTOF10: 0 - NO PAIN

## 2025-06-02 ASSESSMENT — PAIN - FUNCTIONAL ASSESSMENT: PAIN_FUNCTIONAL_ASSESSMENT: 0-10

## 2025-06-02 NOTE — PROGRESS NOTES
Physical Therapy  Physical Therapy Treatment    Patient Name: Mikey Salinas  MRN: [id number]  Today's Date: 6/2/2025  Time Calculation  Start Time: 0930  Stop Time: 1015  Time Calculation (min): 45 min      PT Therapeutic Procedures Time Entry  Manual Therapy Time Entry: 20  Therapeutic Exercise Time Entry: 25            Insurance:  Visit number: 17 of 20  Authorization info: no auth  Insurance Type: Payor: MEDICAL [facility] / Plan: [employer] HMO / Product Type: *No Product type* /      Current Problem  1. Strain of right rotator cuff capsule, subsequent encounter  Follow Up In Physical Therapy      2. Shoulder pain, unspecified chronicity, unspecified laterality  Follow Up In Physical Therapy      3. Aftercare following surgery  Follow Up In Physical Therapy                General:  General  Reason for Referral: R large RTC repair  Referred By: Rosanna Allen PA-C    Precautions:  Precautions  Precautions Comment: Scoliosis, large RTC repair  Medical History Form: Reviewed (scanned into chart)    Subjective:  Patient reports he is feeling pretty good. Feels his hand is starting to improve slowly. Shoulder feels good, no issues w/ HEP.     Pain:  Pain Assessment: 0-10  0-10 (Numeric) Pain Score: 0 - No painJust soreness    ARTHROSCOPY, SHOULDER, WITH ROTATOR CUFF REPAIR  09769 - [place] SURGICAL ARTHROSCOPY SHOULDER W/ROTATOR CUFF RPR     Debridement Shoulder(Beachchair vascular table, Tennet table, Arthrex implant, Regeneten)  [id number] - [place] SURGICAL ARTHROSCOPY SHOULDER XTNSV DBRDMT 3+   Findings: Large 3 cm rotator cuff tear with severe tendinopathy, biceps rupture, degenerative labrum tearing, partial upper border subscapularis tendon tear         Objective:   ROM  *denotes end range pain/discomfort    Cervical AROM (Degrees)    Flexion: 60  Extension: 50  (L) Side Bend: 40  (R) Side Bend: 40  (L) Rotation: 80  (R) Rotation: 80      Shoulder AROM (Degrees) updated  6/2      (R)  (L)  Flexion: 130  180   Abduction: 115  180     ER at 0:  60  90     IR at 0:  belly  belly         Shoulder PROM (Degrees) updated 6/2      (R)  (L)  Flexion: 165*  wnl      Abduction: 160*  wnl     ER at 0:  60*  wnl     IR at 0:  belly  wnl         ER at 45: 65*  wnl      IR at 90: 45  wnl      ER at 90: 65    Elbow AROM (Degrees)      (R)  (L)  Flexion: 135  135      Extension: 0  0         Elbow PROM (Degrees)      (R)  (L)  Flexion: 135  135      Extension: 0  0             Strength Testing HHD testing in seated     Shoulder    (R)  (L)  Flexion: 7.1  12.1      Abduction: 3.7  14.3      ER:  10.1  20.0     IR:  16.5  21.3           Elbow    (R)  (L)  Flexion:          Extension:            Periscapular Musculature    (R)  (L)  Upper Traps:        Middle Traps:        Lower Traps:        Rhomboids:            Posture: moderate rounded shoulders      Palpation: SILT, mild tenderness around portals. Normal for post-op status.       Joint Mobility: NT      Observation: Portals are well approximated and healing w/o signs or symptoms of infection.           Special Tests updated 5/12  Cozens: neg  Aparicio: neg  Phalen: pos   Reverse phalen: pos       Outcome Measures:        Treatments:    Manual Therapy: 20'  PROM Scaption, abduction, ER and 20 and 45 degs to tolerance following precautions  Gentle GHJ inferior glides grade 2-3  Gentle GHJ AP glides grade 2-3  IASTM posterior cuff w/ theragun    There-ex: 25'  UBE 3' fwd, 3' bwd  Yellow TB IR 2x15*  Yellow TB ER 2x15*  Cable face pulls w/ rope and 12.5# 2x15*  Cable tricep extension w/ rope and 5# 2x15    * = added to HEP.  Sheet with exercise descriptions and images issued.  Skilled intervention utilized in the appropriate selection & application of above exercises.  Verbal and tactile cues provided for proper form and technique.  Pt. demonstrated appropriate form & verbalized understanding of optimal technique for above  exercises.        [email or web address]  Access Code: [email or web address]    EDUCATION: Home exercise program, plan of care, activity modifications, pain management, and injury pathology       Assessment:   Patient tolerated today's session w/ expected muscle soreness and fatigue. Demonstrates improvements in activity tolerance, as well as shoulder ROM quality displayed through improved AROM quality w/ less shrugging and progression of there-ex to include isotonic ER/IR and face pulls respectively. Patient is continuing to progress slowly s/p large RTC repair and will benefit from ongoing PT services to continue to progress cuff and scapular strengthening, as well as shoulder ROM as tolerated per protocol to reach established goals to return to PLOF.      Plan:  Continue P/AAROM progression as tolerated and manual therapy prn for symptom control. Assess response to intro of isotonics and progress as tolerated: trial of upright rows, T raises and horizontal adduction to neutral.     Goals: Set and discussed today  Active       R RTC repair       STGs (Met)       Start:  04/01/25    Expected End:  06/09/25    Resolved:  05/27/25    1. Demonstrate independence with home exercise program  2. Tolerate increased exercise without adverse reaction  3. Demonstrate improved posture & proper body mechanics throughout session  4. Increase shoulder scaption, FF and abduction ROM to at least 125 each to progress towards LTGs ad improve ADLs.  5. Increase gross shoulder strength to at least 3+ to progress towards LTGs.  6. Report decrease in pain by > 2 points to meet the MCID         LTGs (Progressing)       Start:  04/01/25    Expected End:  07/07/25       1. Increase FF, scaption and abduction ROM to pain free + at least 160 each  2. Increase gross strength to pain free + at least 4+ to improve ADLs and allow exercise progression  3. Decrease score of Quick DASH by > 8 points to meet the MCID  4. Perform ADLs  without an increase symptoms  5. Increase gross periscapular strength to pain free + at least 3+ to decrease RTC strain and improve ADLs.  6. Increase shoulder ER to pain free + at least 60 degs to improve ADLs.                  Screening  Frequency  Date Last Completed   Spiritual and Cultural Beliefs   Screening each visit or episode of care 5/30/2025   Falls Risk Screening every ambulatory visit    Pain Screening annually at primary care visit  5/30/2025   Domestic Violence screening annually at primary care visit 5/30/2025   Depression Screening annually in the primary care setting 5/30/2025   Suicide Risk Screening annually in the primary care setting 2/17/2025   Nutrition and Food Insecurity   Screening at least annually at primary care visit     Key Learner annually in the primary care setting 5/30/2025   Drug Screen  5/30/2025 10:58 AM   Alcohol Screen  5/30/2025 10:58 AM   Advance Directive  5/30/2025       Plan of care was developed with input and agreement by the patient      Didier Lo, PT, ATC

## 2025-06-09 ENCOUNTER — TREATMENT (OUTPATIENT)
Dept: PHYSICAL THERAPY | Facility: CLINIC | Age: 60
End: 2025-06-09
Payer: COMMERCIAL

## 2025-06-09 DIAGNOSIS — S46.011D STRAIN OF RIGHT ROTATOR CUFF CAPSULE, SUBSEQUENT ENCOUNTER: Primary | ICD-10-CM

## 2025-06-09 DIAGNOSIS — M25.519 SHOULDER PAIN, UNSPECIFIED CHRONICITY, UNSPECIFIED LATERALITY: ICD-10-CM

## 2025-06-09 DIAGNOSIS — Z48.89 AFTERCARE FOLLOWING SURGERY: ICD-10-CM

## 2025-06-09 PROCEDURE — 97110 THERAPEUTIC EXERCISES: CPT | Mod: GP

## 2025-06-09 PROCEDURE — 97140 MANUAL THERAPY 1/> REGIONS: CPT | Mod: GP

## 2025-06-09 ASSESSMENT — PAIN SCALES - GENERAL: PAINLEVEL_OUTOF10: 0 - NO PAIN

## 2025-06-09 ASSESSMENT — PAIN - FUNCTIONAL ASSESSMENT: PAIN_FUNCTIONAL_ASSESSMENT: 0-10

## 2025-06-09 NOTE — PROGRESS NOTES
Physical Therapy  Physical Therapy Treatment    Patient Name: Mikey Salinas  MRN: [id number]  Today's Date: 6/9/2025  Time Calculation  Start Time: 0930  Stop Time: 1013  Time Calculation (min): 43 min      PT Therapeutic Procedures Time Entry  Manual Therapy Time Entry: 15  Therapeutic Exercise Time Entry: 27            Insurance:  Visit number: 18 of 20  Authorization info: no auth  Insurance Type: Payor: MEDICAL [facility] / Plan: [employer] HMO / Product Type: *No Product type* /      Current Problem  1. Strain of right rotator cuff capsule, subsequent encounter  Follow Up In Physical Therapy      2. Shoulder pain, unspecified chronicity, unspecified laterality  Follow Up In Physical Therapy      3. Aftercare following surgery  Follow Up In Physical Therapy                General:  General  Reason for Referral: R large RTC repair  Referred By: Rosanna Allen PA-C    Precautions:  Precautions  Precautions Comment: Scoliosis, large RTC repair  Medical History Form: Reviewed (scanned into chart)    Subjective:  Patient reports he is feeling pretty good. Did all of his exercises last night and is not sore in his shoulder. Still having some hand soreness, just digits 3 and 4. Sees OT tomorrow.     Pain:  Pain Assessment: 0-10  0-10 (Numeric) Pain Score: 0 - No pain (hand soreness no shoulder soreness)    ARTHROSCOPY, SHOULDER, WITH ROTATOR CUFF REPAIR  46873 - [place] SURGICAL ARTHROSCOPY SHOULDER W/ROTATOR CUFF RPR     Debridement Shoulder(Beachchair vascular table, Tennet table, Arthrex implant, Regeneten)  69079 - [place] SURGICAL ARTHROSCOPY SHOULDER XTNSV DBRDMT 3+   Findings: Large 3 cm rotator cuff tear with severe tendinopathy, biceps rupture, degenerative labrum tearing, partial upper border subscapularis tendon tear         Objective:   ROM  *denotes end range pain/discomfort    Cervical AROM (Degrees)    Flexion: 60  Extension: 50  (L) Side Bend: 40  (R) Side Bend: 40  (L) Rotation: 80  (R)  Rotation: 80      Shoulder AROM (Degrees) updated 6/2      (R)  (L)  Flexion: 130  180   Abduction: 115  180     ER at 0:  60  90     IR at 0:  belly  belly         Shoulder PROM (Degrees) updated 6/9      (R)  (L)  Flexion: 170  wnl      Abduction: 180  wnl     ER at 0:  60*  wnl     IR at 0:  belly  wnl         ER at 45: 70  wnl      IR at 90: 30  wnl      ER at 90: 80  100    Elbow AROM (Degrees)      (R)  (L)  Flexion: 135  135      Extension: 0  0         Elbow PROM (Degrees)      (R)  (L)  Flexion: 135  135      Extension: 0  0             Strength Testing HHD testing in seated     Shoulder    (R)  (L)  Flexion: 7.1  12.1      Abduction: 3.7  14.3      ER:  10.1  20.0     IR:  16.5  21.3           Elbow    (R)  (L)  Flexion:          Extension:            Periscapular Musculature    (R)  (L)  Upper Traps:        Middle Traps:        Lower Traps:        Rhomboids:            Posture: moderate rounded shoulders      Palpation: SILT, mild tenderness around portals. Normal for post-op status.       Joint Mobility: NT      Observation: Portals are well approximated and healing w/o signs or symptoms of infection.           Special Tests updated 5/12  Cozens: neg  Aparicio: neg  Phalen: pos   Reverse phalen: pos       Outcome Measures:        Treatments:    Manual Therapy: 15'  PROM Scaption, abduction, ER and 20 and 45 degs to tolerance following precautions  Gentle GHJ AP glides grade 2-3    There-ex: 27'  UBE 3' fwd, 3' bwd  Dowel suleiman w/ red TB attached at top fly fishing px  Red TB upright rows 2x15  Black perform better TB R side bicep iso hold fish reel pull ins 2x15, x15 w/ PT perturbations to band to simulate fish pulling on reel  Black perform better TB pallof press w/ 5# plate perturbations  Cable horizontal adduction w/ 5# 3x10-12  Cable low to high face pulls w/ rope and 10#    * = added to HEP.  Sheet with exercise descriptions and images issued.  Skilled intervention utilized in the appropriate selection &  application of above exercises.  Verbal and tactile cues provided for proper form and technique.  Pt. demonstrated appropriate form & verbalized understanding of optimal technique for above exercises.        [email or web address]  Access Code: [email or web address]    EDUCATION: Home exercise program, plan of care, activity modifications, pain management, and injury pathology       Assessment:   Patient tolerated today's session w/ expected muscle fatigue. Demonstrates improvements in shoulder ROM, cuff and scapular strength displayed throughout session. Did well w/ all exercises in today's session. Patient is progressing s/p large RTC repair and will benefit from ongoing PT services to continue to progress shoulder ROM, cuff and scapular strengthening as tolerated per protocol to reach established goals to return to PLOF.         Plan:  Continue P/AAROM progression as tolerated and manual therapy prn for symptom control.Progress ER/IR resistance, trial of scaption OH press light: trial of T raises.    Goals: Set and discussed today  Active       R RTC repair       STGs (Met)       Start:  04/01/25    Expected End:  06/09/25    Resolved:  05/27/25    1. Demonstrate independence with home exercise program  2. Tolerate increased exercise without adverse reaction  3. Demonstrate improved posture & proper body mechanics throughout session  4. Increase shoulder scaption, FF and abduction ROM to at least 125 each to progress towards LTGs ad improve ADLs.  5. Increase gross shoulder strength to at least 3+ to progress towards LTGs.  6. Report decrease in pain by > 2 points to meet the MCID         LTGs (Progressing)       Start:  04/01/25    Expected End:  07/07/25       1. Increase FF, scaption and abduction ROM to pain free + at least 160 each  2. Increase gross strength to pain free + at least 4+ to improve ADLs and allow exercise progression  3. Decrease score of Quick DASH by > 8 points to meet the MCID  4.  Perform ADLs without an increase symptoms  5. Increase gross periscapular strength to pain free + at least 3+ to decrease RTC strain and improve ADLs.  6. Increase shoulder ER to pain free + at least 60 degs to improve ADLs.                  Screening  Frequency  Date Last Completed   Spiritual and Cultural Beliefs   Screening each visit or episode of care 5/30/2025   Falls Risk Screening every ambulatory visit    Pain Screening annually at primary care visit  5/30/2025   Domestic Violence screening annually at primary care visit 5/30/2025   Depression Screening annually in the primary care setting 5/30/2025   Suicide Risk Screening annually in the primary care setting 2/17/2025   Nutrition and Food Insecurity   Screening at least annually at primary care visit     Key Learner annually in the primary care setting 5/30/2025   Drug Screen  5/30/2025 10:58 AM   Alcohol Screen  5/30/2025 10:58 AM   Advance Directive  5/30/2025       Plan of care was developed with input and agreement by the patient      Didier Lo, PT, ATC

## 2025-06-11 ENCOUNTER — TREATMENT (OUTPATIENT)
Dept: OCCUPATIONAL THERAPY | Facility: CLINIC | Age: 60
End: 2025-06-11
Payer: COMMERCIAL

## 2025-06-11 DIAGNOSIS — R29.898 DECREASED GRIP STRENGTH OF RIGHT HAND: ICD-10-CM

## 2025-06-11 DIAGNOSIS — R60.0 HAND EDEMA: Primary | ICD-10-CM

## 2025-06-11 PROCEDURE — 97110 THERAPEUTIC EXERCISES: CPT | Mod: GO

## 2025-06-11 PROCEDURE — 97035 APP MDLTY 1+ULTRASOUND EA 15: CPT | Mod: GO

## 2025-06-11 NOTE — PROGRESS NOTES
"Occupational Therapy    Occupational Therapy Treatment    Name: Mikey Salinas  MRN: [id number]  : 1965  Date: 2025  Time Calculation  Start Time: 08  Stop Time: 0900  Time Calculation (min): 38 min  OT Therapeutic Procedures Time Entry  Therapeutic Exercise Time Entry: 30, OT Modalities Time Entry  Ultrasound Time Entry: 8  Insurance:  Visit number:  20  Authorization info: no auth  Insurance Type: MMO  Current Problem:  Problem List Items Addressed This Visit           ICD-10-CM    Hand edema - Primary R60.0    Decreased  strength of right hand R29.898       Assessment: Pt is progressing well. Has been compliant with HEP. Demonstrates improved AROM and decreased edema of R hand. Reports some discomfort palmar side of hand around the base of ring and long digits.     Plan: Follow up in 2-3 weeks        Subjective \" I feel a little stiffness down by my middle and ring finger when I first get up\"          Pain Assessment:   2/10 base of ring and long fingers     Objective    Right Hand AROM (degrees)    MCP PIP DIP DPC   IF        0   MF       0   RF       0   SF       0   Thumb           Thumb RABD           Thumb PABD           Edema: 22.5 cm DPC R hand                22.5 cm DPC L hand   Modalities:   US x 8 min over A1 pulley area of ring and middle @ 3 mhz, .9w/cm2, continuous   Communication:     Splinting:     Therapeutic Exercise     Manual Therapy   IASTM to R palm   Trigger point release to volar interossei and lumbricals   Intrinsic stretch to ring and long digits  Long finger flexor stretch    Therapy/Activity:   Strength:     Other Activity:     Outcome Measures:      OP EDUCATION:       Goals:  1. Pt will increase R  strength 20 lbs to improve ability to open jars   2. Pt will be able to touch each digit to DPC to improve grasp for ADL's   3. Pt will have 0/10 R hand pain with active use  4. Pt will have 1 cm improvement R DPC girth to improve pain and function for ADL's  5. Pt " will be independent with HEP to support progress

## 2025-06-16 ENCOUNTER — TREATMENT (OUTPATIENT)
Dept: PHYSICAL THERAPY | Facility: CLINIC | Age: 60
End: 2025-06-16
Payer: COMMERCIAL

## 2025-06-16 DIAGNOSIS — M25.519 SHOULDER PAIN, UNSPECIFIED CHRONICITY, UNSPECIFIED LATERALITY: ICD-10-CM

## 2025-06-16 DIAGNOSIS — S46.011D STRAIN OF RIGHT ROTATOR CUFF CAPSULE, SUBSEQUENT ENCOUNTER: Primary | ICD-10-CM

## 2025-06-16 DIAGNOSIS — Z48.89 AFTERCARE FOLLOWING SURGERY: ICD-10-CM

## 2025-06-16 PROCEDURE — 97110 THERAPEUTIC EXERCISES: CPT | Mod: GP

## 2025-06-16 PROCEDURE — 97140 MANUAL THERAPY 1/> REGIONS: CPT | Mod: GP

## 2025-06-16 ASSESSMENT — PAIN SCALES - GENERAL: PAINLEVEL_OUTOF10: 0 - NO PAIN

## 2025-06-16 ASSESSMENT — PAIN - FUNCTIONAL ASSESSMENT: PAIN_FUNCTIONAL_ASSESSMENT: 0-10

## 2025-06-27 DIAGNOSIS — R10.30 LOWER ABDOMINAL PAIN, UNSPECIFIED: Primary | ICD-10-CM

## 2025-06-30 DIAGNOSIS — R10.30 LOWER ABDOMINAL PAIN, UNSPECIFIED: Primary | ICD-10-CM

## 2025-07-07 ENCOUNTER — APPOINTMENT (OUTPATIENT)
Dept: RADIOLOGY | Facility: HOSPITAL | Age: 60
End: 2025-07-07
Payer: COMMERCIAL

## 2025-07-07 DIAGNOSIS — R10.30 LOWER ABDOMINAL PAIN, UNSPECIFIED: ICD-10-CM

## 2025-07-07 LAB
CREAT SERPL-MCNC: 0.87 MG/DL (ref 0.5–1.3)
EGFRCR SERPLBLD CKD-EPI 2021: >90 ML/MIN/1.73M*2

## 2025-07-07 PROCEDURE — 82565 ASSAY OF CREATININE: CPT

## 2025-07-07 PROCEDURE — 2550000001 HC RX 255 CONTRASTS

## 2025-07-07 PROCEDURE — 74177 CT ABD & PELVIS W/CONTRAST: CPT

## 2025-07-07 PROCEDURE — 36415 COLL VENOUS BLD VENIPUNCTURE: CPT

## 2025-07-07 RX ADMIN — IOHEXOL 75 ML: 350 INJECTION, SOLUTION INTRAVENOUS at 11:14

## 2025-07-07 RX ADMIN — IOHEXOL 75 ML: 350 INJECTION, SOLUTION INTRAVENOUS at 11:36

## 2025-07-08 ENCOUNTER — TREATMENT (OUTPATIENT)
Dept: PHYSICAL THERAPY | Facility: CLINIC | Age: 60
End: 2025-07-08
Payer: COMMERCIAL

## 2025-07-08 DIAGNOSIS — M25.519 SHOULDER PAIN, UNSPECIFIED CHRONICITY, UNSPECIFIED LATERALITY: Primary | ICD-10-CM

## 2025-07-08 DIAGNOSIS — S46.011D STRAIN OF RIGHT ROTATOR CUFF CAPSULE, SUBSEQUENT ENCOUNTER: ICD-10-CM

## 2025-07-08 DIAGNOSIS — Z48.89 AFTERCARE FOLLOWING SURGERY: ICD-10-CM

## 2025-07-08 PROCEDURE — 4200000003 HC PT PACKAGE TREATMENT: Mod: GP

## 2025-07-08 ASSESSMENT — PAIN - FUNCTIONAL ASSESSMENT: PAIN_FUNCTIONAL_ASSESSMENT: 0-10

## 2025-07-08 ASSESSMENT — PAIN SCALES - GENERAL: PAINLEVEL_OUTOF10: 0 - NO PAIN

## 2025-07-08 NOTE — PROGRESS NOTES
Physical Therapy  Physical Therapy Treatment    Patient Name: Mikey Salinas  MRN: [id number]  Today's Date: 7/8/2025  Time Calculation  Start Time: 0700  Stop Time: 0744  Time Calculation (min): 44 min                   Insurance:  Visit number: 21   Authorization info: no auth  Insurance Type: Payor: MEDICAL [facility] / Plan: MEDICAL [facility] HMO / Product Type: *No Product type* /      Current Problem  1. Shoulder pain, unspecified chronicity, unspecified laterality  Follow Up In Physical Therapy      2. Aftercare following surgery  Follow Up In Physical Therapy      3. Strain of right rotator cuff capsule, subsequent encounter  Follow Up In Physical Therapy                General:  General  Reason for Referral: R large RTC repair  Referred By: Rosanna Allen PA-C    Precautions:  Precautions  Precautions Comment: Scoliosis, large RTC repair  Medical History Form: Reviewed (scanned into chart)    Subjective:  Patient reports he is doing well overall. Had a nice trip and was able to fish a little. Shoulder did not limit him but his hand did. Had a CT yesterday and the IV was pulled out causing the contrast to swell his arm.       Pain:  Pain Assessment: 0-10  0-10 (Numeric) Pain Score: 0 - No pain    ARTHROSCOPY, SHOULDER, WITH ROTATOR CUFF REPAIR  78466 - [place] SURGICAL ARTHROSCOPY SHOULDER W/ROTATOR CUFF RPR     Debridement Shoulder(Beachchair vascular table, Tennet table, Arthrex implant, Regeneten)  75105 - [place] SURGICAL ARTHROSCOPY SHOULDER XTNSV DBRDMT 3+   Findings: Large 3 cm rotator cuff tear with severe tendinopathy, biceps rupture, degenerative labrum tearing, partial upper border subscapularis tendon tear         Objective:   ROM  *denotes end range pain/discomfort    Cervical AROM (Degrees)    Flexion: 60  Extension: 50  (L) Side Bend: 40  (R) Side Bend: 40  (L) Rotation: 80  (R) Rotation: 80      Shoulder AROM (Degrees) updated  7/8      (R)  (L)  Flexion: 160  180   Abduction: 140  180     ER at 0:  65  90     IR at 0:  belly  belly         Shoulder PROM (Degrees) updated 7/8      (R)  (L)  Flexion: 174  wnl      Abduction: 160  wnl     ER at 0:  60*  wnl     IR at 0:  belly  wnl         ER at 45: 70  wnl      IR at 90: 35  wnl      ER at 90: 85  100    Elbow AROM (Degrees)      (R)  (L)  Flexion: 135  135      Extension: 0  0         Elbow PROM (Degrees)      (R)  (L)  Flexion: 135  135      Extension: 0  0             Strength Testing HHD testing in seated updated 7/8    Shoulder    (R)  (L)  Flexion: 10.5  12.1      Abduction: 7.2  14.3      ER:  14.1  20.0     IR:  20.0  21.3           Elbow    (R)  (L)  Flexion:          Extension:            Periscapular Musculature updated 7/8    (R)  (L)  Upper Traps: 5       Middle Traps: 3+       Lower Traps: 3+       Rhomboids: 4-           Posture: moderate rounded shoulders      Palpation: SILT, mild tenderness around portals. Normal for post-op status.       Joint Mobility: NT      Observation: Portals are well approximated and healing w/o signs or symptoms of infection.           Special Tests updated 5/12  Cozens: neg  Aparicio: neg  Phalen: pos   Reverse phalen: pos       Outcome Measures:        Treatments:    Manual Therapy: 20'  PROM Scaption, abduction, ER and 20 and 45 degs to tolerance following precautions  GHJ AP glides grade 3-4  GHJ inferior glides grade 3-4    There-ex: 24'  UBE 3' fwd, 3' bwd  Prone T raises 2x12  Prone Y raises  2x12  Cable face pulls w/ 15# and rope  DB scaption w/ 2# 2x12  DB shrugs w/ 30# 2x10  HHD testing         [email or web address]  Access Code: [email or web address]    EDUCATION: Home exercise program, plan of care, activity modifications, pain management, and injury pathology       Assessment:   Patient tolerated today's session w/ expected muscle soreness and fatigue. Demonstrates improvements in shoulder ROM, cuff and scapular strength. Had  difficulty w/ Y raises. Patient is continuing to progress slowly s/p RTC repair and will benefit from ongoing PT services to continue to progress shoulder mobility, cuff and scapular strengthening as tolerated per protocol to reach established goals to return to PLOF.           Plan:  Continue manual, progress established cuff and scap strengthening w/ emphasis on abduction.     Goals: Set and discussed today  Resolved       R RTC repair       STGs (Met)       Start:  04/01/25    Expected End:  06/09/25    Resolved:  05/27/25    1. Demonstrate independence with home exercise program  2. Tolerate increased exercise without adverse reaction  3. Demonstrate improved posture & proper body mechanics throughout session  4. Increase shoulder scaption, FF and abduction ROM to at least 125 each to progress towards LTGs ad improve ADLs.  5. Increase gross shoulder strength to at least 3+ to progress towards LTGs.  6. Report decrease in pain by > 2 points to meet the MCID         LTGs (Met)       Start:  04/01/25    Expected End:  07/07/25    Resolved:  07/08/25    1. Increase FF, scaption and abduction ROM to pain free + at least 160 each  2. Increase gross strength to pain free + at least 4+ to improve ADLs and allow exercise progression  3. Decrease score of Quick DASH by > 8 points to meet the MCID  4. Perform ADLs without an increase symptoms  5. Increase gross periscapular strength to pain free + at least 3+ to decrease RTC strain and improve ADLs.  6. Increase shoulder ER to pain free + at least 60 degs to improve ADLs.                  Screening  Frequency  Date Last Completed   Spiritual and Cultural Beliefs   Screening each visit or episode of care 5/30/2025   Falls Risk Screening every ambulatory visit    Pain Screening annually at primary care visit  5/30/2025   Domestic Violence screening annually at primary care visit 5/30/2025   Depression Screening annually in the primary care setting 5/30/2025   Suicide Risk  Screening annually in the primary care setting 2/17/2025   Nutrition and Food Insecurity   Screening at least annually at primary care visit     Key Learner annually in the primary care setting 5/30/2025   Drug Screen  5/30/2025 10:58 AM   Alcohol Screen  5/30/2025 10:58 AM   Advance Directive  5/30/2025       Plan of care was developed with input and agreement by the patient      Didier Lo, PT, ATC

## 2025-07-09 ENCOUNTER — TREATMENT (OUTPATIENT)
Dept: OCCUPATIONAL THERAPY | Facility: CLINIC | Age: 60
End: 2025-07-09
Payer: COMMERCIAL

## 2025-07-09 DIAGNOSIS — R29.898 DECREASED GRIP STRENGTH OF RIGHT HAND: ICD-10-CM

## 2025-07-09 DIAGNOSIS — R60.0 HAND EDEMA: Primary | ICD-10-CM

## 2025-07-09 PROCEDURE — 97140 MANUAL THERAPY 1/> REGIONS: CPT | Mod: GO

## 2025-07-09 PROCEDURE — 97110 THERAPEUTIC EXERCISES: CPT | Mod: GO

## 2025-07-09 NOTE — PROGRESS NOTES
"  Occupational Therapy    Occupational Therapy Treatment    Name: Mikey Salinas  MRN: [id number]  : 1965  Date: 2025  Time Calculation  Start Time: 1000  Stop Time: 1038  Time Calculation (min): 38 min  OT Therapeutic Procedures Time Entry  Therapeutic Exercise Time Entry: 20  Manual Therapy Time Entry: 18,    Insurance:  Visit number: 3 of 20  Authorization info: no auth  Insurance Type: MMO  Current Problem:  Problem List Items Addressed This Visit           ICD-10-CM    Hand edema - Primary R60.0    Decreased  strength of right hand R29.898         Assessment: Pt is progressing well. Has been compliant with HEP. Demonstrates improved AROM and decreased edema of R hand.  strength improved 30 lbs since initial eval. Also has  improved lumbrical flexibility      Plan: Follow up in 2-3 weeks        Subjective \" I feel a little stiffness down by my middle and ring finger when I first get up\"          Pain Assessment:   2/10 base of ring and long fingers     Objective    Right Hand AROM (degrees)    MCP PIP DIP DPC   IF        0   MF       0   RF       0   SF       0   Thumb           Thumb RABD           Thumb PABD           Edema: 22.5 cm DPC R hand                22.5 cm DPC L hand Hand Strength Measures (lbs)    R L   Dynamometer  85 100   Lateral Pinch 19 20   3jaw Pinch 19 20       Modalities:     Communication:     Splinting:     Therapeutic Exercise   Wrist twist x 6 x 2 with 2 lbs   Red power web-finger flexion, abd, add x 15x3 each   Manual Therapy   IASTM to R palm   Trigger point release to volar interossei and lumbricals   Intrinsic stretch to ring and long digits  Long finger flexor stretch    Therapy/Activity:   Strength:     Other Activity:     Outcome Measures:      OP EDUCATION:       Goals:  1. Pt will increase R  strength 20 lbs to improve ability to open jars. met  2. Pt will be able to touch each digit to DPC to improve grasp for ADL's. met  3. Pt will have 0/10 R hand " pain with active use  4. Pt will have 1 cm improvement R DPC girth to improve pain and function for ADL's. Met  5. Pt will be independent with HEP to support progress. met

## 2025-07-25 ENCOUNTER — APPOINTMENT (OUTPATIENT)
Dept: ORTHOPEDIC SURGERY | Facility: CLINIC | Age: 60
End: 2025-07-25
Payer: COMMERCIAL

## 2025-07-25 ENCOUNTER — TREATMENT (OUTPATIENT)
Dept: OCCUPATIONAL THERAPY | Facility: CLINIC | Age: 60
End: 2025-07-25
Payer: COMMERCIAL

## 2025-07-25 DIAGNOSIS — R29.898 DECREASED GRIP STRENGTH OF RIGHT HAND: ICD-10-CM

## 2025-07-25 DIAGNOSIS — R60.0 HAND EDEMA: Primary | ICD-10-CM

## 2025-07-25 DIAGNOSIS — Z48.89 AFTERCARE FOLLOWING SURGERY: Primary | ICD-10-CM

## 2025-07-25 DIAGNOSIS — Z98.890 S/P ROTATOR CUFF REPAIR: ICD-10-CM

## 2025-07-25 PROCEDURE — 97140 MANUAL THERAPY 1/> REGIONS: CPT | Mod: GO

## 2025-07-25 PROCEDURE — 97110 THERAPEUTIC EXERCISES: CPT | Mod: GO

## 2025-07-25 PROCEDURE — 99024 POSTOP FOLLOW-UP VISIT: CPT

## 2025-07-25 NOTE — PROGRESS NOTES
Returns now about 5 months out from his rotator cuff repair.  He did also see hand who sent him to OT. Notes improvement in the swelling and stiffness. Resolved rash of his armpit with the cream we sent. Overall he has had much improvement with his shoulder. Working on strengthening.     Review of Systems  A complete review of systems was conducted, pertinent only to the HPI noted above.  Constitutional: None  Eyes: No additions to above history  Ears, Nose, Throat: No additions to above history  Cardiovascular: No additions to above history  Respiratory: No additions to above history  GI: No additions to above history  : No additions to above history  Skin/Neuro: No additions to above history  Endocrine/Heme/Lymph: No additions to above history  Immunologic: No additions to above history  Psychiatric: No additions to above history  Musculoskeletal: see above    Physical Exam  GEN: Alert and Oriented x 3  Constitutional: Well appearing, in no apparent distress.        Focused Musculoskeletal Exam:     RIGHT  shoulder:  portal incisions closed, no signs of infection,  biceps incision closed, no signs of infection, biceps fires, deltoid fires. PROM  ER 60. AROM  ER 60 IR T12. Strength 4/5    Sensation intact Ax/median/ulnar/radial distributions  Motor intact Ax/median/radial/ulnar/AIN/PIN    Doing well from his shoulder. Continue PT and HEP. Continue to progress strengthening with PT.  Follow up 3 months. All questions answered, patient in agreement with the plan.

## 2025-07-25 NOTE — PROGRESS NOTES
"    Occupational Therapy    Occupational Therapy Treatment    Name: Mikey Salinas  MRN: [id number]  : 1965  Date: 2025  Time Calculation  Start Time: 0900  Stop Time: 930  Time Calculation (min): 30 min  OT Therapeutic Procedures Time Entry  Therapeutic Exercise Time Entry: 15  Manual Therapy Time Entry: 15,    Insurance:  Visit number: 4 of 20  Authorization info: no auth  Insurance Type: MMO  Current Problem:  Problem List Items Addressed This Visit           ICD-10-CM    Hand edema - Primary R60.0    Decreased  strength of right hand R29.898         Assessment: Pt continues to report improved AROM and decreased stiffness of fingers. Edema decreased another .5cm since last visit.      Plan: Follow up in 2-3 weeks        Subjective \" It's feeling a little better, not quite as stiff\"          Pain Assessment:   2/10 base of ring and long fingers     Objective    Right Hand AROM (degrees)    MCP PIP DIP DPC   IF        0   MF       0   RF       0   SF       0   Thumb           Thumb RABD           Thumb PABD           Edema: 22.0 cm DPC R hand                22.5 cm DPC L hand Hand Strength Measures (lbs)    R L   Dynamometer  85 100   Lateral Pinch 19 20   3jaw Pinch 19 20       Modalities:     Communication:     Splinting:     Therapeutic Exercise   Wrist twist x 6 x 2 with 2 lbs   Red power web-finger flexion, abd, add x 15x3 each   Manual Therapy   IASTM to R palm   Intrinsic stretch to ring and long digits  Long finger flexor stretch    Therapy/Activity:   Strength:     Other Activity:     Outcome Measures:      OP EDUCATION:       Goals:  1. Pt will increase R  strength 20 lbs to improve ability to open jars. met  2. Pt will be able to touch each digit to DPC to improve grasp for ADL's. met  3. Pt will have 0/10 R hand pain with active use  4. Pt will have 1 cm improvement R DPC girth to improve pain and function for ADL's. Met  5. Pt will be independent with HEP to support progress. " met

## 2025-07-28 ENCOUNTER — APPOINTMENT (OUTPATIENT)
Dept: PHYSICAL THERAPY | Facility: CLINIC | Age: 60
End: 2025-07-28
Payer: COMMERCIAL

## 2025-07-28 DIAGNOSIS — Z48.89 AFTERCARE FOLLOWING SURGERY: ICD-10-CM

## 2025-07-28 DIAGNOSIS — M25.519 SHOULDER PAIN, UNSPECIFIED CHRONICITY, UNSPECIFIED LATERALITY: ICD-10-CM

## 2025-07-28 DIAGNOSIS — S46.011D STRAIN OF RIGHT ROTATOR CUFF CAPSULE, SUBSEQUENT ENCOUNTER: Primary | ICD-10-CM

## 2025-07-28 PROCEDURE — 4200000003 HC PT PACKAGE TREATMENT: Mod: GP

## 2025-07-28 ASSESSMENT — PAIN SCALES - GENERAL: PAINLEVEL_OUTOF10: 0 - NO PAIN

## 2025-07-28 ASSESSMENT — PAIN - FUNCTIONAL ASSESSMENT: PAIN_FUNCTIONAL_ASSESSMENT: 0-10

## 2025-07-28 NOTE — PROGRESS NOTES
Physical Therapy  Physical Therapy Treatment    Patient Name: Mikey Salinas  MRN: [id number]  Today's Date: 7/28/2025  Time Calculation  Start Time: 1430  Stop Time: 1515  Time Calculation (min): 45 min                   Insurance:  Visit number: 22  Authorization info: no auth  Insurance Type: Do not bill insurance.     Current Problem  1. Strain of right rotator cuff capsule, subsequent encounter        2. Shoulder pain, unspecified chronicity, unspecified laterality        3. Aftercare following surgery                  General:  General  Reason for Referral: R large RTC repair  Referred By: Rosanna Allen PA-C    Precautions:  Precautions  Precautions Comment: Scoliosis, large RTC repair  Medical History Form: Reviewed (scanned into chart)    Subjective:  Patient reports he is feeling pretty good overall, no pain just soreness. Ortho check in went well, ok to continue strength and ROM progression.     Pain:  Pain Assessment: 0-10  0-10 (Numeric) Pain Score: 0 - No pain    ARTHROSCOPY, SHOULDER, WITH ROTATOR CUFF REPAIR  69198 - [place] SURGICAL ARTHROSCOPY SHOULDER W/ROTATOR CUFF RPR     Debridement Shoulder(Beachchair vascular table, Tennet table, Arthrex implant, Regeneten)  91500 - [place] SURGICAL ARTHROSCOPY SHOULDER XTNSV DBRDMT 3+   Findings: Large 3 cm rotator cuff tear with severe tendinopathy, biceps rupture, degenerative labrum tearing, partial upper border subscapularis tendon tear         Objective:   ROM  *denotes end range pain/discomfort    Cervical AROM (Degrees)    Flexion: 60  Extension: 50  (L) Side Bend: 40  (R) Side Bend: 40  (L) Rotation: 80  (R) Rotation: 80      Shoulder AROM (Degrees) updated 7/8      (R)  (L)  Flexion: 160  180   Abduction: 140  180     ER at 0:  65  90     IR at 0:  belly  belly         Shoulder PROM (Degrees) updated 7/28      (R)  (L)  Flexion: 180  wnl      Abduction: 160  wnl     ER at 0:  60*  wnl     IR at 0:  belly  wnl         ER at 45: 70  wnl      IR at  90: 35  wnl      ER at 90: 90  100    Elbow AROM (Degrees)      (R)  (L)  Flexion: 135  135      Extension: 0  0         Elbow PROM (Degrees)      (R)  (L)  Flexion: 135  135      Extension: 0  0             Strength Testing HHD testing in seated updated 7/8    Shoulder    (R)  (L)  Flexion: 10.5  12.1      Abduction: 7.2  14.3      ER:  14.1  20.0     IR:  20.0  21.3           Elbow    (R)  (L)  Flexion:          Extension:            Periscapular Musculature updated 7/8    (R)  (L)  Upper Traps: 5       Middle Traps: 3+       Lower Traps: 3+       Rhomboids: 4-           Posture: moderate rounded shoulders      Palpation: SILT, mild tenderness around portals. Normal for post-op status.       Joint Mobility: NT      Observation: Portals are well approximated and healing w/o signs or symptoms of infection.           Special Tests updated 5/12  Cozens: neg  Aparicio: neg  Phalen: pos   Reverse phalen: pos       Outcome Measures:        Treatments:    Manual Therapy: 15'  PROM Scaption, abduction, ER and 20 and 45 degs to tolerance following precautions  GHJ AP glides grade 3-4  GHJ inferior glides grade 3-4    There-ex: 30'  UBE 3' fwd, 3' bwd  BFR UE strength protocol 192 LOP, WP 50%  S/l shoulder abduction 30-w/1# DB 15-w/3# DB 15-15  S/l shoulder ER w/ bolster and 3# DB 25-15-12/failure  Scaption to 90 w/ 1# DB 30-15-15-15        [email or web address]  Access Code: [email or web address]    EDUCATION: Home exercise program, plan of care, activity modifications, pain management, and injury pathology       Assessment:   Patient tolerated today's session w/ expected muscle fatigue. Demonstrates improvements in shoulder ROM, cuff and scapular strength displayed throughout session. Did well w/ introduction of BFR strengthening. Patient will benefit from ongoing PT services to continue to progress cuff and scapular strengthening as well as shoulder mobility activities as tolerated per protocol to reach established  goals to return to PLOF.         Plan:  Continue manual, progress established cuff and scap strengthening w/ emphasis on abduction. Assess response to BFR and continue if warranted. Recheck HHD.    Goals: Set and discussed today  Resolved       R RTC repair       STGs (Met)       Start:  04/01/25    Expected End:  06/09/25    Resolved:  05/27/25    1. Demonstrate independence with home exercise program  2. Tolerate increased exercise without adverse reaction  3. Demonstrate improved posture & proper body mechanics throughout session  4. Increase shoulder scaption, FF and abduction ROM to at least 125 each to progress towards LTGs ad improve ADLs.  5. Increase gross shoulder strength to at least 3+ to progress towards LTGs.  6. Report decrease in pain by > 2 points to meet the MCID         LTGs (Met)       Start:  04/01/25    Expected End:  07/07/25    Resolved:  07/08/25    1. Increase FF, scaption and abduction ROM to pain free + at least 160 each  2. Increase gross strength to pain free + at least 4+ to improve ADLs and allow exercise progression  3. Decrease score of Quick DASH by > 8 points to meet the MCID  4. Perform ADLs without an increase symptoms  5. Increase gross periscapular strength to pain free + at least 3+ to decrease RTC strain and improve ADLs.  6. Increase shoulder ER to pain free + at least 60 degs to improve ADLs.               Annually for patients age 55 and older and patients with life-threatening illness and more frequently at the discretion of the provider when there has been a change in patient condition/clinical indication.   Screening  Date Last Completed   Advance Directives 5/30/2025     Annually during a primary care office visit and at the discretion of the provider when there has been a change in patient condition/clinical indication.  Depression Screening 5/30/2025   Suicide Risk Screening 2/17/2025     Annually during any provider office visit and at the discretion of the  provider when there has been a change in patient condition/clinical indication.  Alcohol Screening 5/30/2025 10:58 AM   Substance Use Screening 5/30/2025 10:58 AM    Tobacco 5/30/2025 10:58 AM     Completed at the discretion of the provider during any provider office visit or when there has been a change in patient condition/clinical indication.  Domestic Violence 5/30/2025   Human Trafficking    Spiritual/Cultural  5/30/2025   Food Insecurity Screening Social Determinants of Health     Patient Education/Key Learner 5/30/2025   Pain Screening 5/30/2025     Completed per Adult Falls Prevention (Outpatient), CP-[id number]  Falls Risk Screening              Plan of care was developed with input and agreement by the patient      Didier Lo, PT, ATC

## 2025-08-04 ENCOUNTER — OFFICE VISIT (OUTPATIENT)
Dept: PULMONOLOGY | Facility: CLINIC | Age: 60
End: 2025-08-04
Payer: COMMERCIAL

## 2025-08-04 VITALS
HEART RATE: 78 BPM | SYSTOLIC BLOOD PRESSURE: 130 MMHG | HEIGHT: 70 IN | RESPIRATION RATE: 18 BRPM | TEMPERATURE: 98.1 F | DIASTOLIC BLOOD PRESSURE: 88 MMHG | WEIGHT: 226 LBS | OXYGEN SATURATION: 97 % | BODY MASS INDEX: 32.35 KG/M2

## 2025-08-04 DIAGNOSIS — J98.11 ATELECTASIS: Primary | ICD-10-CM

## 2025-08-04 PROCEDURE — 99203 OFFICE O/P NEW LOW 30 MIN: CPT | Performed by: STUDENT IN AN ORGANIZED HEALTH CARE EDUCATION/TRAINING PROGRAM

## 2025-08-04 PROCEDURE — 99202 OFFICE O/P NEW SF 15 MIN: CPT | Performed by: STUDENT IN AN ORGANIZED HEALTH CARE EDUCATION/TRAINING PROGRAM

## 2025-08-04 PROCEDURE — 1036F TOBACCO NON-USER: CPT | Performed by: STUDENT IN AN ORGANIZED HEALTH CARE EDUCATION/TRAINING PROGRAM

## 2025-08-04 PROCEDURE — 3008F BODY MASS INDEX DOCD: CPT | Performed by: STUDENT IN AN ORGANIZED HEALTH CARE EDUCATION/TRAINING PROGRAM

## 2025-08-04 ASSESSMENT — ENCOUNTER SYMPTOMS
SHORTNESS OF BREATH: 0
ABDOMINAL PAIN: 0
COLOR CHANGE: 0
CONFUSION: 0
CONSTIPATION: 0
SPEECH DIFFICULTY: 0
DIZZINESS: 0
VOMITING: 0
WHEEZING: 0
BLOOD IN STOOL: 0
TROUBLE SWALLOWING: 0
LIGHT-HEADEDNESS: 0
UNEXPECTED WEIGHT CHANGE: 0
HEADACHES: 0
FEVER: 0
JOINT SWELLING: 0
NAUSEA: 0
DIFFICULTY URINATING: 0
DIARRHEA: 0
ARTHRALGIAS: 0
ABDOMINAL DISTENTION: 0
SLEEP DISTURBANCE: 0
CHILLS: 0
COUGH: 0

## 2025-08-04 NOTE — PROGRESS NOTES
Department of Medicine I Division of Pulmonary, Critical Care, and Sleep Medicine   [address] [place] [address]  Phone: [phone]  Fax: [phone]    History of Present Illness   Mikey Salinas is a 59 y.o. male presenting with CT findings .    Self referral     Here for findings of BL mild basilar atelectasis seen on CT abd/pelvis last month  Has been working with breathing exercises (sister is respiratory therapist)   Previously followed with Dr. Lopez (pulmonolgist) for lung nodules   MMRC 0  Denies any problems breathing on a daily basis   Denies any history of asthma or copd   Does have a cough occasionally from the reflux   Denies recurrent lung infections   Had recent R rotator cuff surgery in February, was not as active     PMH   History of PE on Xarelto   Factor II deficiency   GERD   Scoliosis         Review of Systems  Review of Systems   Constitutional:  Negative for chills, fever and unexpected weight change.   HENT:  Negative for congestion and trouble swallowing.    Respiratory:  Negative for cough, shortness of breath and wheezing.    Cardiovascular:  Negative for chest pain.   Gastrointestinal:  Negative for abdominal distention, abdominal pain, blood in stool, constipation, diarrhea, nausea and vomiting.   Genitourinary:  Negative for difficulty urinating.   Musculoskeletal:  Negative for arthralgias and joint swelling.   Skin:  Negative for color change.   Neurological:  Negative for dizziness, speech difficulty, light-headedness and headaches.   Psychiatric/Behavioral:  Negative for confusion and sleep disturbance.      All other review of systems are negative and/or non-contributory.    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), Hereditary deficiency of other clotting factors, History of deep  venous thrombosis (DVT) of distal vein of left lower extremity (10/19/2023), History of pulmonary embolism (10/19/2023), HL (hearing loss) (12/2023), Hyperlipidemia (09/19/2023), Irritable bowel syndrome (2/2023), Low back pain, Other pulmonary embolism without acute cor pulmonale (02/03/2015), Personal history of diseases of the blood and blood-forming organs and certain disorders involving the immune mechanism, Personal history of other venous thrombosis and embolism, Scoliosis (1978), Tension type headache (09/19/2023), and Traumatic tear of supraspinatus tendon of right shoulder.    He has no past medical history of PONV (postoperative nausea and vomiting) or Refusal of blood product.    Immunizations     Immunization History   Administered Date(s) Administered    COVID-19, mRNA, LNP-S, PF, 30 mcg/0.3 mL dose 03/24/2021, 04/14/2021, 12/13/2021    Flu vaccine (IIV4), preservative free *Check age/dose* 09/25/2018, 10/19/2023    Flu vaccine, quadrivalent, no egg protein, age 6 month or greater (FLUCELVAX) 10/11/2021, 10/13/2022    Flu vaccine, trivalent, preservative free, age 6 months and greater (Fluarix/Fluzone/Flulaval) 11/11/2024    Hepatitis A vaccine, age 19 years and greater (HAVRIX) 06/08/2022    Hepatitis A vaccine, pediatric/adolescent (HAVRIX, VAQTA) 07/03/2009, 08/03/2009    Hepatitis B vaccine, 19 yrs and under (RECOMBIVAX, ENGERIX) 07/03/2009, 08/03/2009    Influenza, injectable, MDCK, quadrivalent 11/01/2017, 10/10/2018, 12/09/2019    Influenza, seasonal, injectable 07/03/2009, 09/21/2016    MMR vaccine, subcutaneous (MMR II) 07/03/2009    Pfizer COVID-19 vaccine, 12 years and older, (30mcg/0.3mL) (Comirnaty) 11/11/2024    Pfizer COVID-19 vaccine, bivalent, age 12 years and older (30 mcg/0.3 mL) 12/17/2022    Pfizer Gray Cap SARS-CoV-2 06/15/2022    Poliovirus vaccine, subcutaneous (IPOL) 07/03/2009    Tdap vaccine, age 7 year and older (BOOSTRIX, ADACEL) 07/03/2009, 01/30/2018, 06/08/2022     Typhoid, Parenteral 07/01/2009    Typhoid, ViCPs 06/08/2022    Zoster vaccine, recombinant, adult (SHINGRIX) 10/15/2021, 02/24/2022       Medications and Allergies     Current Outpatient Medications   Medication Instructions    chlordiazepoxide-clidinium (Librax) 5-2.5 mg capsule 4 capsules, 4 times daily before meals and nightly    diclofenac sodium (Voltaren) 1 % gel gel Apply topically.    fluticasone (Flonase Allergy Relief) 50 mcg/actuation nasal spray 1 spray, Daily    ketoconazole (NIZOral) 2 % cream Topical, 2 times daily    L.acid/L.casei/B.bif/B.kenroy/FOS (PROBIOTIC BLEND ORAL) Take by mouth.    metoprolol succinate XL (TOPROL-XL) 25 mg, oral, Daily, Do not crush or chew.    NON FORMULARY Move free    Xarelto 20 mg, oral, Daily      Chocolate flavor, Cocoa, and Penicillins    Social History   He reports that he has never smoked. He has never been exposed to tobacco smoke. He has never used smokeless tobacco. He reports current alcohol use of about 2.0 standard drinks of alcohol per week. He reports that he does not use drugs.    Smoking History: denies any smoking history.  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  Has two dogs at home     Family History   Family History[1]  Brother and sister with prothrombin deficiency as well       Surgical History   He has a past surgical history that includes Knee arthroscopy w/ debridement (02/02/2015); MR angio head wo IV contrast (12/15/2016); Lipoma resection (N/A, 09/2023); [place] tooth extraction (1982); Colonoscopy (10/2023); and Upper gastrointestinal endoscopy (7/2023).    Physical Exam     Vitals:    08/04/25 0750   BP: 130/88   Pulse: 78   Resp: 18   Temp: 36.7 °C (98.1 °F)   SpO2: 97%          Physical Exam  Constitutional:       Appearance: Normal appearance.   HENT:      Head: Normocephalic and atraumatic.     Eyes:      Pupils: Pupils are equal, round, and reactive to light.  "      Cardiovascular:      Rate and Rhythm: Normal rate and regular rhythm.   Pulmonary:      Effort: Pulmonary effort is normal.      Breath sounds: Normal breath sounds.     Skin:     Findings: No rash.     Neurological:      General: No focal deficit present.      Mental Status: He is alert and oriented to person, place, and time.     Psychiatric:         Mood and Affect: Mood normal.          Results   Pulmonary Function Tests:  Failed to redirect to the Timeline version of the [employer] SmartLink.    No results found for: \"FEV1\", \"[id number]\"    Chest Radiograph:  XR chest 2 views     Narrative  PROCEDURE:         CHEST 2 VIEW - WXR  0020  REASON FOR EXAM: Chest Pain    RESULT: CLINICAL HISTORY: Chest pain, headache, anxiety    TECHNIQUE: 2 views of the chest were performed.    FINDINGS:    Cardiac silhouette: Normal.  Mediastinum: Unremarkable.  Pulmonary vascularity: Normal.  Lungs: No infiltrates.  Pleura: No effusion..  Bony structures: Severe dextroscoliosis of thoracic spine.    Impression  No acute abnormalities.      [id number]    This report has been produced using speech recognition.    Original Interpreting Physician:   TEMI CHACKO MD  Original Transcribed by/Date: [facility]   Feb 24 2022  1:13P  Original Electronically Signed by/Date: TEMI CHACKO MD Feb 24 2022  1:13P    Addendum Interpreting Physician:  Addendum Transcribed by/Date: NO ADDENDUM  Addendum Electronically Signed by/Date:      Chest CT Scan:  CT angio chest for pulmonary embolism 08/28/2024    Narrative  Interpreted By:  Shaquille Ramirez,  STUDY:  CT ANGIO CHEST FOR PULMONARY EMBOLISM;  8/28/2024 9:48 pm    INDICATION:  Signs/Symptoms:palpitations, Leiden factor V.    COMPARISON:  04/09/2024 CT chest, 12/09/2022 CT chest    ACCESSION NUMBER(S):  [id number]    ORDERING CLINICIAN:  VILMA ABBOTT    TECHNIQUE:  Contiguous axial images of the chest were obtained after the  intravenous administration of iodinated contrast using " angiographic  PE protocol. Coronal and sagittal reformatted images were  reconstructed from the axial data. MIP images were created on an  independent workstation and reviewed.    FINDINGS:  MEDIASTINUM AND LYMPH NODES:  The esophageal wall appears within  normal limits.  No enlarged intrathoracic or axillary lymph nodes by  imaging criteria. No pneumomediastinum.    VESSELS:  Normal caliber thoracic aorta without dissection. No  significant aortic atherosclerosis. Within a segmental right lower  lobe pulmonary artery, there is a linear, thread like filling defect  that represents intraluminal web/fibrin strand from a remote  pulmonary embolism; additionally, a can be seen on prior study  12/09/2022. No acute pulmonary embolism.    HEART: There is mild cardiomegaly with biventricular enlargement.  No  coronary artery calcifications. No significant pericardial effusion.    LUNG, AIRWAYS, AND PLEURA:  No consolidation, pulmonary edema,  pleural effusion or pneumothorax.    OSSEOUS STRUCTURES: No acute osseous abnormality. Chronic compression  fractures of L2 and L3. There is a dextroconvex thoracic scoliosis  and levoconvex lumbar scoliosis.    CHEST WALL SOFT TISSUES: No discernible abnormality.    UPPER ABDOMEN/OTHER: No acute abnormality.    Impression  No acute pulmonary embolism or other acute cardiopulmonary process.    Redemonstration of an intraluminal linear web in the right lower lobe  segmental pulmonary artery consistent with remote pulmonary embolism.    MACRO:  None.    Signed by: Shaquille Ramirez 8/28/2024 11:04 PM  Dictation workstation:   [id number]       CT abd/pelvis  7/11/2025   FINDINGS:  LOWER CHEST:  Mild bibasilar atelectasis      ABDOMEN:      LIVER:  Unremarkable      BILE DUCTS:  Not dilated.      GALLBLADDER:  No calcified stone or definite inflammation.      PANCREAS:  Unremarkable      SPLEEN:  Unremarkable      ADRENAL GLANDS:  Unremarkable      KIDNEYS AND URETERS:  Unremarkable  "without hydronephrosis. No urinary tract calculus.      PELVIS:      BLADDER:  Partially distended.      REPRODUCTIVE ORGANS:  Mild prostatomegaly.      BOWEL:  No findings of bowel obstruction or inflammation.    Unremarkable  appendix.    Unremarkable mesentery.      VESSELS:  No abdominal aortic aneurysm.Mild atherosclerosis.      PERITONEUM AND RETROPERITONEUM:  No free fluid or free air.  No abdominal or pelvic lymphadenopathy.      BONE AND ABDOMINAL WALL:  No acute skeletal findings.  Mild anterior wedging at L2 and L3  unchanged. Grade 1 spondylolisthesis with spondylolysis L5-S1.  multilevel degenerative changes.          IMPRESSION:  No acute findings of the abdomen or pelvis identified on noncontrast  exam.      ECHO:  No results found for this or any previous visit from the past 365 days.       Labs:  Lab Results   Component Value Date    WBC 6.3 02/03/2025    HGB 15.6 02/03/2025    HCT 47.9 02/03/2025    MCV 88 02/03/2025     02/03/2025       Lab Results   Component Value Date    CREATININE 0.87 07/07/2025    BUN 17 02/03/2025     02/03/2025    K 4.7 02/03/2025     (H) 02/03/2025    CO2 28 02/03/2025        Lab Results   Component Value Date    SEDRATE 7 08/19/2024        IgE: No results found for: \"IGE\"   Respiratory Allergy Panel:  AEC:   Eosinophils Absolute (x10*3/uL)   Date Value   02/03/2025 0.18     Eosinophils % (%)   Date Value   02/03/2025 2.8       Cultures:  No results found for: \"AFBCX\"   No results found for: \"RESPCULTCYFI\"    No results found for the last 90 days.  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 Continue to stay active work on breathing exercises; no need for repeat imaging unless " develops new respiratory symptoms   Continuing on xarelto indefinitley--follows with cardiology/pcp     RTC as needed          Nancy Hopkins MD  08/04/2025       [1]   Family History  Problem Relation Name Age of Onset    Macular degeneration Mother Megha     Osteoporosis Mother Megha     Transient ischemic attack Mother Megha     Brain Aneurysm Mother Megha     Hypertension Mother Megha     Other (Heart valve surgery) Father Ray     Other (RHEUMATIC HEART DISEASE) Father Ray     Nephrolithiasis Father Ray     Heart disease Father Ray     Hypertension Father Ray     Breast cancer Sister Peg 58    Irritable bowel syndrome Sister Peg     ROGER disease Sister Peg     Hypertension Sister Peg     Cancer Sister Peg     Prostate cancer Brother      Cancer Brother Yves Salinas     Cancer Other GRAND FATHER

## 2025-08-04 NOTE — PATIENT INSTRUCTIONS
Thank you for visiting the Pulmonary Clinic today.   Keep working  on breathing exercises as you are doing   Return as needed   If you have questions or concerns, call [phone] (option 4)

## 2025-08-11 ENCOUNTER — APPOINTMENT (OUTPATIENT)
Dept: OCCUPATIONAL THERAPY | Facility: CLINIC | Age: 60
End: 2025-08-11
Payer: COMMERCIAL

## 2025-08-11 ENCOUNTER — APPOINTMENT (OUTPATIENT)
Dept: PHYSICAL THERAPY | Facility: CLINIC | Age: 60
End: 2025-08-11
Payer: COMMERCIAL

## 2025-08-11 DIAGNOSIS — M25.519 SHOULDER PAIN, UNSPECIFIED CHRONICITY, UNSPECIFIED LATERALITY: Primary | ICD-10-CM

## 2025-08-27 ENCOUNTER — TREATMENT (OUTPATIENT)
Dept: OCCUPATIONAL THERAPY | Facility: CLINIC | Age: 60
End: 2025-08-27
Payer: COMMERCIAL

## 2025-08-27 DIAGNOSIS — R60.0 HAND EDEMA: ICD-10-CM

## 2025-08-27 DIAGNOSIS — R29.898 DECREASED GRIP STRENGTH OF RIGHT HAND: ICD-10-CM

## 2025-08-27 PROCEDURE — 97035 APP MDLTY 1+ULTRASOUND EA 15: CPT | Mod: GO

## 2025-08-27 PROCEDURE — 97140 MANUAL THERAPY 1/> REGIONS: CPT | Mod: GO

## 2025-08-28 ENCOUNTER — CLINICAL SUPPORT (OUTPATIENT)
Dept: PHYSICAL THERAPY | Facility: CLINIC | Age: 60
End: 2025-08-28

## 2025-08-28 DIAGNOSIS — Z48.89 AFTERCARE FOLLOWING SURGERY: ICD-10-CM

## 2025-08-28 DIAGNOSIS — M25.519 SHOULDER PAIN, UNSPECIFIED CHRONICITY, UNSPECIFIED LATERALITY: ICD-10-CM

## 2025-08-28 DIAGNOSIS — S46.011D STRAIN OF RIGHT ROTATOR CUFF CAPSULE, SUBSEQUENT ENCOUNTER: Primary | ICD-10-CM

## 2025-08-28 PROCEDURE — 4200000003 HC PT PACKAGE TREATMENT: Mod: GP

## 2025-10-24 ENCOUNTER — APPOINTMENT (OUTPATIENT)
Dept: ORTHOPEDIC SURGERY | Facility: CLINIC | Age: 60
End: 2025-10-24
Payer: COMMERCIAL

## 2025-10-28 ENCOUNTER — APPOINTMENT (OUTPATIENT)
Dept: CARDIOLOGY | Facility: HOSPITAL | Age: 60
End: 2025-10-28
Payer: COMMERCIAL

## 2026-01-23 ENCOUNTER — APPOINTMENT (OUTPATIENT)
Dept: CARDIOLOGY | Facility: HOSPITAL | Age: 61
End: 2026-01-23
Payer: COMMERCIAL

## (undated) DEVICE — BLANKET, LOWER BODY, VHA PLUS, ADULT

## (undated) DEVICE — GLOVE, SURGICAL, PROTEXIS PI , 6.5, PF, LF

## (undated) DEVICE — KIT, BEACH CHAIR TRIMANO

## (undated) DEVICE — TUBING, DUAL WAVE, OUTFLOW

## (undated) DEVICE — Device

## (undated) DEVICE — DRESSING, TRANSPARENT, TEGADERM, FRAME STYLE, 4 X 4.5, STRL

## (undated) DEVICE — GOWN, SURGICAL, SIRUS, NON REINFORCED, LARGE

## (undated) DEVICE — HD SCORPION NEEDLE (5 PACK)

## (undated) DEVICE — STRIP, SKIN CLOSURE, STERI STRIP, REINFORCED, 0.5 X 4 IN

## (undated) DEVICE — TUBING, PATIENT 8FT STERILE

## (undated) DEVICE — DRESSING, GAUZE, PETROLATUM, PATCH, XEROFORM, 1 X 8 IN, STERILE

## (undated) DEVICE — SUTURE, VICRYL, 2-0, 27 IN, SH, UNDYED

## (undated) DEVICE — GLOVE, SURGICAL, PROTEXIS PI BLUE W/NEUTHERA, 6.5, PF, LF

## (undated) DEVICE — TUBING, SUCTION, 6MM X 10, CLEAN N-COND

## (undated) DEVICE — EXCAL 4MM X 13CM SINGLE

## (undated) DEVICE — CANNULA, TRIPLE-DAM TWIST-IN, 7MM X 7CM, VALVED

## (undated) DEVICE — SUTURE, ETHILON, 3-0, 18 IN, PS1, BLACK

## (undated) DEVICE — BONECUTTER, COOLCUT TORPEDO, 4.0MM X 13CM

## (undated) DEVICE — PROBE, APOLLO RF, 90 DEG, EXTRA LARTGE

## (undated) DEVICE — GLOVE, SURGICAL, PROTEXIS PI ORTHO, 7.5, PF, LF

## (undated) DEVICE — CANNULA, TWIST-IN,  6MM X 7CM

## (undated) DEVICE — DRESSING, ABDOMINAL, WET PRUF, TENDERSORB, 5 X 9 IN, STERILE

## (undated) DEVICE — SUTURE, MONOCRYL, 3-0, 27 IN, PS-2, UNDYED

## (undated) DEVICE — GLOVE, SURGICAL, PROTEXIS PI ORTHO, 8.0, PF, LF

## (undated) DEVICE — TUBING, PUMP REDEUCE 8FT STERILE